# Patient Record
Sex: FEMALE | Race: WHITE | NOT HISPANIC OR LATINO | Employment: STUDENT | URBAN - METROPOLITAN AREA
[De-identification: names, ages, dates, MRNs, and addresses within clinical notes are randomized per-mention and may not be internally consistent; named-entity substitution may affect disease eponyms.]

---

## 2017-04-04 ENCOUNTER — TRANSFERRED RECORDS (OUTPATIENT)
Dept: HEALTH INFORMATION MANAGEMENT | Facility: CLINIC | Age: 27
End: 2017-04-04

## 2017-07-15 ENCOUNTER — HEALTH MAINTENANCE LETTER (OUTPATIENT)
Age: 27
End: 2017-07-15

## 2017-09-07 ENCOUNTER — TELEPHONE (OUTPATIENT)
Dept: FAMILY MEDICINE | Facility: CLINIC | Age: 27
End: 2017-09-07

## 2017-09-07 NOTE — TELEPHONE ENCOUNTER
9/7/2017    Call Regarding Preventive Health Screening Cervical/PAP    Attempt 1    Message BUSY TONE    Comments:       Outreach   KV

## 2017-09-12 NOTE — TELEPHONE ENCOUNTER
9/12/2017    Call Regarding Preventive Health Screening Cervical/PAP    Attempt 2    Message     Comments: BUSY TONE      Outreach   ALEJANDRINA

## 2017-09-18 NOTE — TELEPHONE ENCOUNTER
9/18/2017    Call Regarding Preventive Health Screening Cervical/PAP    Attempt 3    Message Busy    Comments:       Outreach   CC

## 2017-09-21 ENCOUNTER — MYC MEDICAL ADVICE (OUTPATIENT)
Dept: FAMILY MEDICINE | Facility: CLINIC | Age: 27
End: 2017-09-21

## 2017-09-28 ENCOUNTER — OFFICE VISIT (OUTPATIENT)
Dept: FAMILY MEDICINE | Facility: CLINIC | Age: 27
End: 2017-09-28
Payer: COMMERCIAL

## 2017-09-28 VITALS
HEART RATE: 64 BPM | SYSTOLIC BLOOD PRESSURE: 108 MMHG | BODY MASS INDEX: 20.12 KG/M2 | DIASTOLIC BLOOD PRESSURE: 66 MMHG | WEIGHT: 130.4 LBS | TEMPERATURE: 98.3 F

## 2017-09-28 DIAGNOSIS — J01.01 ACUTE RECURRENT MAXILLARY SINUSITIS: Primary | ICD-10-CM

## 2017-09-28 PROCEDURE — 99213 OFFICE O/P EST LOW 20 MIN: CPT | Performed by: NURSE PRACTITIONER

## 2017-09-28 RX ORDER — CEFPROZIL 500 MG/1
500 TABLET, FILM COATED ORAL 2 TIMES DAILY
Qty: 20 TABLET | Refills: 0 | Status: SHIPPED | OUTPATIENT
Start: 2017-09-28 | End: 2023-04-19

## 2017-09-28 NOTE — LETTER
9/28/2017     Rose Ronquillo  [address] [facility] [address]      To Whom It May Concern,    Please excuse Rose from work tonight. She does have a sinus infection and throat irritation she was started on medication today.      If her throat is still sore tomorrow please excuse her from work tomorrow as well.       Sincerely,        Anat Vargas APRN-[facility]  [address] [place] [address]  Phone: [phone]

## 2017-09-28 NOTE — NURSING NOTE
"Chief Complaint   Patient presents with     URI       Initial /66 (BP Location: Right arm, Patient Position: Chair, Cuff Size: Adult Regular)  Pulse 64  Temp 98.3  F (36.8  C) (Oral)  Wt 130 lb 6.4 oz (59.1 kg)  BMI 20.12 kg/m2 Estimated body mass index is 20.12 kg/(m^2) as calculated from the following:    Height as of 6/6/16: 5' 7.5\" (1.715 m).    Weight as of this encounter: 130 lb 6.4 oz (59.1 kg).  Medication Reconciliation: complete     Sandra Sanchez CMA (AAMA)      "

## 2017-09-28 NOTE — MR AVS SNAPSHOT
After Visit Summary   9/28/2017    Rose Ronquillo    MRN: [id number]           Patient Information     Date Of Birth          1990        Visit Information        Provider Department      9/28/2017 10:40 AM Anat Vargas APRN [facility]        Today's Diagnoses     Acute recurrent maxillary sinusitis    -  1      Care Instructions    Gargle with warm salt water.  For the sore throat use warm tea this will feel better,     Rest the throat    Start the Cefzil - antibiotic - today                   Follow-ups after your visit        Who to contact     Normal or non-critical lab and imaging results will be communicated to you by [employer], letter or phone within 4 business days after the clinic has received the results. If you do not hear from us within 7 days, please contact the clinic through [employer] or phone. If you have a critical or abnormal lab result, we will notify you by phone as soon as possible.  Submit refill requests through [employer] or call your pharmacy and they will forward the refill request to us. Please allow 3 business days for your refill to be completed.          If you need to speak with a  for additional information , please call: [phone]           Additional Information About Your Visit        [employer] Information     [employer] gives you secure access to your electronic health record. If you see a primary care provider, you can also send messages to your care team and make appointments. If you have questions, please call your primary care clinic.  If you do not have a primary care provider, please call [phone] and they will assist you.        Care EveryWhere ID     This is your Care EveryWhere ID. This could be used by other organizations to access your [place] medical records  [id number]        Your Vitals Were     Pulse Temperature BMI (Body Mass Index)             64 98.3  F (36.8  C) (Oral) 20.12 kg/m2          Blood  Pressure from Last 3 Encounters:   09/28/17 108/66   06/06/16 101/70   03/15/16 91/58    Weight from Last 3 Encounters:   09/28/17 130 lb 6.4 oz (59.1 kg)   06/06/16 135 lb (61.2 kg)   03/15/16 142 lb 3.2 oz (64.5 kg)              Today, you had the following     No orders found for display         Today's Medication Changes          These changes are accurate as of: 9/28/17 11:08 AM.  If you have any questions, ask your nurse or doctor.               Start taking these medicines.        Dose/Directions    cefPROZIL 500 MG tablet   Commonly known as:  CEFZIL   Used for:  Acute recurrent maxillary sinusitis   Started by:  Anat Vargas APRN CNP        Dose:  500 mg   Take 1 tablet (500 mg) by mouth 2 times daily   Quantity:  20 tablet   Refills:  0         These medicines have changed or have updated prescriptions.        Dose/Directions    ZOLOFT 50 MG tablet   This may have changed:  See the new instructions.   Generic drug:  sertraline        I tablet daily   Quantity:  30   Refills:  0            Where to get your medicines      These medications were sent to [facility] [address] [place] [address] AT [facility] [address]  [address] [facility] [address]     Phone:  [phone]     cefPROZIL 500 MG tablet                Primary Care Provider Office Phone # Fax #    Stephanie Scott DO [place] [phone] [address] [place] [facility] [address]        Equal Access to Services     LAMAR LEE AH: Hadii aad ku hadasho Soomaali, waaxda luqadaha, qaybta kaalmada adeegyada, waxay mendozain haybenton hanna. So [facility] [phone].    ATENCIÓN: Si habla español, tiene a dudley disposición servicios gratuitos de asistencia lingüística. Llame al [phone].    We comply with applicable federal civil rights laws and [place] laws. We do not discriminate on the basis of race, color, national origin, age, disability sex, sexual orientation or  gender identity.            Thank you!     Thank you for choosing [facility]  for your care. Our goal is always to provide you with excellent care. Hearing back from our patients is one way we can continue to improve our services. Please take a few minutes to complete the written survey that you may receive in the mail after your visit with us. Thank you!             Your Updated Medication List - Protect others around you: Learn how to safely use, store and throw away your medicines at www.disposemymeds.org.          This list is accurate as of: 9/28/17 11:08 AM.  Always use your most recent med list.                   Brand Name Dispense Instructions for use Diagnosis    BENEFIBER PO           cefPROZIL 500 MG tablet    CEFZIL    20 tablet    Take 1 tablet (500 mg) by mouth 2 times daily    Acute recurrent maxillary sinusitis       gabapentin 100 MG capsule    NEURONTIN     Take 100 mg by mouth Takes 400mg in the morning        ibuprofen 200 MG tablet    ADVIL/MOTRIN     Take 200 mg by mouth every 4 hours as needed        Ipratropium-Albuterol  MCG/ACT inhaler    COMBIVENT RESPIMAT    1 Inhaler    Inhale 1 puff into the lungs 4 times daily Not to exceed 6 doses per day.    Exercise induced bronchospasm       MULTI-VITAMIN PO           order for DME     1 Device    Equipment being ordered: ankle brace    Left ankle injury, initial encounter       TUMS PO      Take  by mouth.        VITAMIN D PO           VYVANSE 50 MG capsule   Generic drug:  lisdexamfetamine      1 CAPSULE DAILY        ZOLOFT 50 MG tablet   Generic drug:  sertraline     30    I tablet daily

## 2017-09-28 NOTE — PROGRESS NOTES
SUBJECTIVE:   Rose Ronquillo is a 27 year old female who presents to clinic today for the following health issues:      ENT Symptoms             Symptoms: cc Present Absent Comment   Fever/Chills  x  Sweats/chills, no fever   Fatigue   x    Muscle Aches   x    Eye Irritation   x    Sneezing   x    Nasal Manas/Drg  x  Runny nose - clear mucous, does have hx of nose  Bleeds due to deviated septum    Sinus Pressure/Pain  x  Pressure that moves around in the face   Loss of smell   x    Dental pain   x    Sore Throat  x  Uncomfortable, lots of PND - does not believe that she has strep   Swollen Glands  x     Ear Pain/Fullness   x    Cough   x    Wheeze   x    Chest Pain   x    Shortness of breath   x    Rash   x    Other x x  Hoarse voice, throat is painful ,   Works as  at restaurant ,      Symptom duration:  2 weeks   Symptom severity:  Not getting better, moderate   Treatments tried:  Supplement from chiropractor, fluids   Contacts:  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  Additional history: as documented    Patient Active Problem List   Diagnosis     Anxiety state     Head injury     Exercise-induced asthma     Abdominal pain     24 hour contact handout given     CARDIOVASCULAR SCREENING; LDL GOAL LESS THAN 160     Seasonal allergic rhinitis     Patellofemoral syndrome     Scoliosis concern     Migraine with aura and with status migrainosus, not intractable     Past Surgical History:   Procedure Laterality Date     HC CREATE EARDRUM OPENING,GEN ANESTH      P.E. Tubes x2     SURGICAL HISTORY OF -       Removal of PE Tubes       Social History   Substance Use Topics     Smoking status: Never Smoker     Smokeless tobacco: Never Used     Alcohol use Yes      Comment: occasional     Family History   Problem Relation Age of Onset     Hypertension Mother      blood pressure issues-not sure if its high or low     Neurologic Disorder Father       Myothesnia Gravis     Asthma Paternal Aunt          Current Outpatient Prescriptions   Medication Sig Dispense Refill     Wheat Dextrin (BENEFIBER PO)        gabapentin (NEURONTIN) 100 MG capsule Take 100 mg by mouth Takes 400mg in the morning       ZOLOFT 50 MG OR TABS I tablet daily (Patient taking differently: 1.5 tablets daily) 30 0     order for DME Equipment being ordered: ankle brace (Patient not taking: Reported on 9/28/2017) 1 Device 0     Ipratropium-Albuterol (COMBIVENT RESPIMAT)  MCG/ACT inhaler Inhale 1 puff into the lungs 4 times daily Not to exceed 6 doses per day. (Patient not taking: Reported on 9/28/2017) 1 Inhaler 0     Cholecalciferol (VITAMIN D PO)        Multiple Vitamin (MULTI-VITAMIN PO)        ibuprofen (ADVIL,MOTRIN) 200 MG tablet Take 200 mg by mouth every 4 hours as needed       Calcium Carbonate Antacid (TUMS PO) Take  by mouth.       VYVANSE 50 MG OR CAPS 1 CAPSULE DAILY       Allergies   Allergen Reactions     Erythromycin      EES-INTOL     Penicillins      PCN Stomach intol     BP Readings from Last 3 Encounters:   09/28/17 108/66   06/06/16 101/70   03/15/16 91/58    Wt Readings from Last 3 Encounters:   09/28/17 130 lb 6.4 oz (59.1 kg)   06/06/16 135 lb (61.2 kg)   03/15/16 142 lb 3.2 oz (64.5 kg)                        Reviewed and updated as needed this visit by clinical staff     Reviewed and updated as needed this visit by Provider         ROS:  See note above.     OBJECTIVE:     /66 (BP Location: Right arm, Patient Position: Chair, Cuff Size: Adult Regular)  Pulse 64  Temp 98.3  F (36.8  C) (Oral)  Wt 130 lb 6.4 oz (59.1 kg)  BMI 20.12 kg/m2  Body mass index is 20.12 kg/(m^2).   GENERAL: healthy, alert and no distress  EYES: Eyes grossly normal to inspection, PERRL and conjunctivae and sclerae normal  HENT: ear canals and TM's normal, POSITIVE for nasal mucosa edematous , rhinorrhea purulent, oropharynx clear, oral mucous membranes moist and POSITIVE for  sinuses: maxillary, frontal, ethmoid tenderness bilaterally  NECK: POSITIVE for bilateral anterior and posterior cervical adenopathy and no asymmetry, masses, or scars  RESP: lungs clear to auscultation - no rales, rhonchi or wheezes  CV: regular rate and rhythm, normal S1 S2, no S3 or S4, no murmur, click or rub, no peripheral edema and peripheral pulses strong      Diagnostic Test Results:  none     ASSESSMENT/PLAN:     ASSESSMENT/PLAN:      ICD-10-CM    1. Acute recurrent maxillary sinusitis J01.01 cefPROZIL (CEFZIL) 500 MG tablet       Patient Instructions   Gargle with warm salt water.  For the sore throat use warm tea this will feel better,     Rest the throat      Start the Cefzil - antibiotic - today       MEDICATIONS:        - Start taking Cefzil        - Continue other medications without change  See Patient Instructions    VICKY MARISCAL NP, APRN [facility]

## 2017-09-28 NOTE — PATIENT INSTRUCTIONS
Gargle with warm salt water.  For the sore throat use warm tea this will feel better,     Rest the throat    Start the Cefzil - antibiotic - today

## 2017-12-09 ENCOUNTER — MYC MEDICAL ADVICE (OUTPATIENT)
Dept: FAMILY MEDICINE | Facility: CLINIC | Age: 27
End: 2017-12-09

## 2017-12-23 ENCOUNTER — NURSE TRIAGE (OUTPATIENT)
Dept: NURSING | Facility: CLINIC | Age: 27
End: 2017-12-23

## 2017-12-24 ENCOUNTER — TRANSFERRED RECORDS (OUTPATIENT)
Dept: HEALTH INFORMATION MANAGEMENT | Facility: CLINIC | Age: 27
End: 2017-12-24

## 2017-12-24 NOTE — TELEPHONE ENCOUNTER
Caller just finished antibiotic prescribed by on [facility]; symptoms are returning; inquiring if she can have new RX called in; does not know what antibiotic she was on.  Triage protocol reviewed; no fever or chills; voice is hoarse and feels sinus congestion persisting. Advised in home care  Advised reevaluation.   Will return to previous facility tomorrow.  Reason for Disposition    [1] Taking antibiotic > 72 hours (3 days) AND [2] sinus pain not improved    Protocols used: SINUS INFECTION ON ANTIBIOTIC FOLLOW-UP CALL-ADULT-

## 2018-01-05 ENCOUNTER — MYC MEDICAL ADVICE (OUTPATIENT)
Dept: FAMILY MEDICINE | Facility: CLINIC | Age: 28
End: 2018-01-05

## 2018-01-05 DIAGNOSIS — J32.9 CHRONIC SINUSITIS, UNSPECIFIED LOCATION: Primary | ICD-10-CM

## 2018-04-17 ENCOUNTER — TELEPHONE (OUTPATIENT)
Dept: FAMILY MEDICINE | Facility: CLINIC | Age: 28
End: 2018-04-17

## 2018-04-17 NOTE — TELEPHONE ENCOUNTER
Panel Management Review      Patient has the following on her problem list:     Asthma review     ACT Total Scores 6/6/2016   ACT TOTAL SCORE -   ASTHMA ER VISITS -   ASTHMA HOSPITALIZATIONS -   ACT TOTAL SCORE (Goal Greater than or Equal to 20) 24   In the past 12 months, how many times did you visit the emergency room for your asthma without being admitted to the hospital? 0   In the past 12 months, how many times were you hospitalized overnight because of your asthma? 0      1. Is Asthma diagnosis on the Problem List? Yes    2. Is Asthma listed on Health Maintenance? Yes    3. Patient is due for:  ACT and AAP      Composite cancer screening  Chart review shows that this patient is due/due soon for the following Pap Smear  Summary:    Patient is due/failing the following:   PAP, ACT/AAP    Action needed:   Patient needs office visit for physical with PAP and Patient needs to do ACT, update AAP.    Type of outreach:    Sent [employer] message.    Questions for provider review:    None                                                                                                                                    Sandra Brito CMA (AAMA)       Chart routed to None .

## 2018-08-06 ENCOUNTER — TELEPHONE (OUTPATIENT)
Dept: FAMILY MEDICINE | Facility: CLINIC | Age: 28
End: 2018-08-06

## 2018-08-06 NOTE — LETTER
August 6, 2018      Rose Ronquillo  [address] [facility] [address]        Dear Rose,     As part of [facility]'s commitment to health and wellness we have reviewed your chart and it indicates that you are due for one or more of the following:    -- Pap smear. The last pap that we have on file for you was from 12/23/2013. These are recommended every 3 years. Please call our clinic to schedule your pap smear / physical appointment.     -- An Asthma Control Test. To ensure you are receiving the best care, it is important to stay current with healthcare visits. We recommend that you have an office visit every 6 months for your asthma and have an  Updated Asthma Control Test and Asthma Action Plan each year to help you stay in good control with your asthma.      We have enclosed the Asthma Control Test for you to fill out and mail back to us (no matter what your score is).  If your end score is 20 or less, please schedule an appointment to discuss your asthma score.    Please try to schedule and/or complete the tests above within the next 2-4 weeks.   The number to call to schedule an appointment at [facility] is [phone].    While we work hard to maintain accurate records, it is always possible that this notice does not accurately reflect tests that you may have had. To ensure that we do not send you unnecessary notices please verify that we have accurate dates of your tests, even if these were done many years ago.     Thank you for choosing [facility] for your healthcare needs.      Sincerely,     ERYN Albrecht/ ant

## 2018-08-06 NOTE — LETTER
My Asthma Action Plan  Name: Rose Ronquillo   YOB: 1990  Date: 8/6/2018   My doctor: VICKY MARISCAL NP, APRN CNP   My clinic: [facility]         My Asthma Severity: Exercise-induced  Avoid your asthma triggers: exercise or sports and see attached list of possible triggers           GREEN ZONE   Good Control    I feel good    No cough or wheeze    Can work, sleep and play without asthma symptoms       Take your asthma control medicine every day.     1. If exercise triggers your asthma, take your rescue medication    15 minutes before exercise or sports, and    During exercise if you have asthma symptoms  2. Spacer to use with inhaler: If you have a spacer, make sure to use it with your inhaler             YELLOW ZONE Getting Worse  I have ANY of these:    I do not feel good    Cough or wheeze    Chest feels tight    Wake up at night   1. Keep taking your Green Zone medications  2. Start taking your rescue medicine:    every 20 minutes for up to 1 hour. Then every 4 hours for 24-48 hours.  3. If you stay in the Yellow Zone for more than 12-24 hours, contact your doctor.  4. If you do not return to the Green Zone in 12-24 hours or you get worse, start taking your oral steroid medicine if prescribed by your provider.           RED ZONE Medical Alert - Get Help  I have ANY of these:    I feel awful    Medicine is not helping    Breathing getting harder    Trouble walking or talking    Nose opens wide to breathe       1. Take your rescue medicine NOW  2. If your provider has prescribed an oral steroid medicine, start taking it NOW  3. Call your doctor NOW  4. If you are still in the Red Zone after 20 minutes and you have not reached your doctor:    Take your rescue medicine again and    Call 911 or go to the emergency room right away    See your regular doctor within 2 weeks of an Emergency Room or Urgent Care visit for follow-up treatment.          Annual Reminders:  Meet with Asthma  Educator,  Flu Shot in the Fall, consider Pneumonia Vaccination for patients with asthma (aged 19 and older).    Pharmacy:    [facility] - [facility] [place] - [address] [id number] - [place], [place] - [address] [place] [address] DRUG STORE [id number] - [facility] [address] AT [facility] [address]                      Asthma Triggers  How To Control Things That Make Your Asthma Worse    Triggers are things that make your asthma worse.  Look at the list below to help you find your triggers and what you can do about them.  You can help prevent asthma flare-ups by staying away from your triggers.      Trigger                                                          What you can do   Cigarette Smoke  Tobacco smoke can make asthma worse. Do not allow smoking in your home, car or around you.  Be sure no one smokes at a child s day care or school.  If you smoke, ask your health care provider for ways to help you quit.  Ask family members to quit too.  Ask your health care provider for a referral to Quit Plan to help you quit smoking, or call 6-820-921-PLAN.     Colds, Flu, Bronchitis  These are common triggers of asthma. Wash your hands often.  Don t touch your eyes, nose or mouth.  Get a flu shot every year.     Dust Mites  These are tiny bugs that live in cloth or carpet. They are too small to see. Wash sheets and blankets in hot water every week.   Encase pillows and mattress in dust mite proof covers.  Avoid having carpet if you can. If you have carpet, vacuum weekly.   Use a dust mask and HEPA vacuum.   Pollen and Outdoor Mold  Some people are allergic to trees, grass, or weed pollen, or molds. Try to keep your windows closed.  Limit time out doors when pollen count is high.   Ask you health care provider about taking medicine during allergy season.     Animal Dander  Some people are allergic to skin flakes, urine or saliva from pets with fur or feathers. Keep pets with  fur or feathers out of your home.    If you can t keep the pet outdoors, then keep the pet out of your bedroom.  Keep the bedroom door closed.  Keep pets off cloth furniture and away from stuffed toys.     Mice, Rats, and Cockroaches  Some people are allergic to the waste from these pests.   Cover food and garbage.  Clean up spills and food crumbs.  Store grease in the refrigerator.   Keep food out of the bedroom.   Indoor Mold  This can be a trigger if your home has high moisture. Fix leaking faucets, pipes, or other sources of water.   Clean moldy surfaces.  Dehumidify basement if it is damp and smelly.   Smoke, Strong Odors, and Sprays  These can reduce air quality. Stay away from strong odors and sprays, such as perfume, powder, hair spray, paints, smoke incense, paint, cleaning products, candles and new carpet.   Exercise or Sports  Some people with asthma have this trigger. Be active!  Ask your doctor about taking medicine before sports or exercise to prevent symptoms.    Warm up for 5-10 minutes before and after sports or exercise.     Other Triggers of Asthma  Cold air:  Cover your nose and mouth with a scarf.  Sometimes laughing or crying can be a trigger.  Some medicines and food can trigger asthma.

## 2018-08-06 NOTE — TELEPHONE ENCOUNTER
Panel Management Review      Patient has the following on her problem list:     Asthma review     ACT Total Scores 6/6/2016   ACT TOTAL SCORE -   ASTHMA ER VISITS -   ASTHMA HOSPITALIZATIONS -   ACT TOTAL SCORE (Goal Greater than or Equal to 20) 24   In the past 12 months, how many times did you visit the emergency room for your asthma without being admitted to the hospital? 0   In the past 12 months, how many times were you hospitalized overnight because of your asthma? 0      1. Is Asthma diagnosis on the Problem List? Yes    2. Is Asthma listed on Health Maintenance? Yes    3. Patient is due for:  ACT and AAP      Composite cancer screening  Chart review shows that this patient is due/due soon for the following Pap Smear  Summary:    Patient is due/failing the following:   PAP; ACT/AAP    Action needed:   Patient needs office visit for physical with PAP and Patient needs to do ACT, update AAP.    Type of outreach:    Sent letter.    Questions for provider review:    None                                                                                                                                    Sandra Brito CMA (AAMA)       Chart routed to None .

## 2019-11-03 ENCOUNTER — HEALTH MAINTENANCE LETTER (OUTPATIENT)
Age: 29
End: 2019-11-03

## 2020-11-16 ENCOUNTER — HEALTH MAINTENANCE LETTER (OUTPATIENT)
Age: 30
End: 2020-11-16

## 2021-04-13 ENCOUNTER — IMMUNIZATION (OUTPATIENT)
Dept: NURSING | Facility: CLINIC | Age: 31
End: 2021-04-13
Payer: COMMERCIAL

## 2021-04-13 PROCEDURE — 91300 PR COVID VAC PFIZER DIL RECON 30 MCG/0.3 ML IM: CPT

## 2021-04-13 PROCEDURE — 0001A PR COVID VAC PFIZER DIL RECON 30 MCG/0.3 ML IM: CPT

## 2021-05-04 ENCOUNTER — IMMUNIZATION (OUTPATIENT)
Dept: NURSING | Facility: CLINIC | Age: 31
End: 2021-05-04
Attending: INTERNAL MEDICINE
Payer: COMMERCIAL

## 2021-05-04 PROCEDURE — 91300 PR COVID VAC PFIZER DIL RECON 30 MCG/0.3 ML IM: CPT

## 2021-05-04 PROCEDURE — 0002A PR COVID VAC PFIZER DIL RECON 30 MCG/0.3 ML IM: CPT

## 2021-09-18 ENCOUNTER — HEALTH MAINTENANCE LETTER (OUTPATIENT)
Age: 31
End: 2021-09-18

## 2022-01-08 ENCOUNTER — HEALTH MAINTENANCE LETTER (OUTPATIENT)
Age: 32
End: 2022-01-08

## 2022-09-19 ENCOUNTER — TRANSFERRED RECORDS (OUTPATIENT)
Dept: HEALTH INFORMATION MANAGEMENT | Facility: CLINIC | Age: 32
End: 2022-09-19

## 2022-10-12 NOTE — TELEPHONE ENCOUNTER
DIAGNOSIS: childhood scoliosis,increase pain in spine and neck with worsening curve, self referred by mother Lexy Ronquillo   APPOINTMENT DATE: 10.27.22   NOTES STATUS DETAILS   OFFICE NOTE from other specialist Internal 5.6.10 Dr Stephanie Colunga, [facility]   MEDICATION LIST Internal    LABS     CBC/DIFF Internal    XRAYS (IMAGES & REPORTS) Internal 5.6.10 lumbar spine, thoracic spine  3.31.08 lumbar spine, [facility] - n/a     Action 10.12.22 9:33 AM TAMIKA   Action Taken Faxed request to [facility] for images [phone]     Action 10.13.22 8:34 AM TAMIKA   Action Taken Sent pt an email asking about records     Action 10.20.22 8:14 AM TAMIKA   Action Taken Sent [employer] message to pt     Action 10.25.22 11:04 AM TAMIKA   Action Taken Faxed request to [facility] [phone] and [facility] [phone] for images      Action 10.28.22 4:17 PM    Action Taken Received disc from [facility]. Put disc in [employer]'s drop basket for processing.       Action November 4, 2022 4:07 PM [place]    Action Taken Received imaging disc for 9/8/22 spinal views and sent it to scan to be process

## 2022-10-25 DIAGNOSIS — M41.9 SCOLIOSIS: Primary | ICD-10-CM

## 2022-10-26 ASSESSMENT — ENCOUNTER SYMPTOMS
POLYDIPSIA: 0
HOARSE VOICE: 1
POLYPHAGIA: 0
DIARRHEA: 1
BACK PAIN: 1
ARTHRALGIAS: 0
RECTAL PAIN: 0
SORE THROAT: 1
SPEECH CHANGE: 0
DIZZINESS: 1
NIGHT SWEATS: 1
ABDOMINAL PAIN: 1
TREMORS: 0
TROUBLE SWALLOWING: 0
BOWEL INCONTINENCE: 0
BLOOD IN STOOL: 0
MYALGIAS: 1
WEAKNESS: 1
LOSS OF CONSCIOUSNESS: 0
HEARTBURN: 0
NECK MASS: 0
NECK PAIN: 1
MUSCLE CRAMPS: 1
VOMITING: 0
FEVER: 1
PARALYSIS: 0
BLOATING: 1
DECREASED APPETITE: 1
WEIGHT GAIN: 0
SINUS CONGESTION: 1
TASTE DISTURBANCE: 0
SMELL DISTURBANCE: 0
HALLUCINATIONS: 0
TINGLING: 1
MUSCLE WEAKNESS: 1
FATIGUE: 1
ALTERED TEMPERATURE REGULATION: 1
MEMORY LOSS: 0
WEIGHT LOSS: 0
JOINT SWELLING: 0
SEIZURES: 0
HEADACHES: 1
SINUS PAIN: 1
DISTURBANCES IN COORDINATION: 0
NAUSEA: 0
CHILLS: 1
INCREASED ENERGY: 0
STIFFNESS: 1
NUMBNESS: 1
CONSTIPATION: 0
JAUNDICE: 0

## 2022-10-27 ENCOUNTER — OFFICE VISIT (OUTPATIENT)
Dept: ORTHOPEDICS | Facility: CLINIC | Age: 32
End: 2022-10-27
Payer: COMMERCIAL

## 2022-10-27 ENCOUNTER — PRE VISIT (OUTPATIENT)
Dept: ORTHOPEDICS | Facility: CLINIC | Age: 32
End: 2022-10-27

## 2022-10-27 ENCOUNTER — ANCILLARY PROCEDURE (OUTPATIENT)
Dept: GENERAL RADIOLOGY | Facility: CLINIC | Age: 32
End: 2022-10-27
Attending: ORTHOPAEDIC SURGERY
Payer: COMMERCIAL

## 2022-10-27 VITALS — BODY MASS INDEX: 20.16 KG/M2 | HEIGHT: 68 IN | WEIGHT: 133 LBS

## 2022-10-27 DIAGNOSIS — M54.6 CHRONIC MIDLINE THORACIC BACK PAIN: Primary | ICD-10-CM

## 2022-10-27 DIAGNOSIS — M42.00 SCHEUERMANN'S KYPHOSIS: ICD-10-CM

## 2022-10-27 DIAGNOSIS — G89.29 CHRONIC MIDLINE THORACIC BACK PAIN: Primary | ICD-10-CM

## 2022-10-27 DIAGNOSIS — M41.9 SCOLIOSIS: ICD-10-CM

## 2022-10-27 PROCEDURE — 72082 X-RAY EXAM ENTIRE SPI 2/3 VW: CPT | Performed by: STUDENT IN AN ORGANIZED HEALTH CARE EDUCATION/TRAINING PROGRAM

## 2022-10-27 PROCEDURE — 72100 X-RAY EXAM L-S SPINE 2/3 VWS: CPT | Performed by: STUDENT IN AN ORGANIZED HEALTH CARE EDUCATION/TRAINING PROGRAM

## 2022-10-27 PROCEDURE — 99024 POSTOP FOLLOW-UP VISIT: CPT | Mod: GC | Performed by: ORTHOPAEDIC SURGERY

## 2022-10-27 PROCEDURE — 77073 BONE LENGTH STUDIES: CPT | Performed by: STUDENT IN AN ORGANIZED HEALTH CARE EDUCATION/TRAINING PROGRAM

## 2022-10-27 NOTE — LETTER
10/27/2022         RE: Rose Ronquillo  [address] [id number]        Dear Colleague,    Thank you for referring your patient, Rose Ronquillo, to the [facility] [place]. Please see a copy of my visit note below.    Rose Ronquillo complains of    Chief Complaint   Patient presents with     Consult     childhood scoliosis,increase pain in spine and neck with worsening curve, self referred by mother Lexy Ronquillo        I have reviewed and updated the patient's Past Medical History, Social History, Family History and Medication List.    ALLERGIES  Erythromycin and Penicillins    Spine Surgery Consultation    REFERRING PHYSICIAN: Referred Self   PRIMARY CARE PHYSICIAN: Stephanie Colunga           Chief Complaint:   Consult (childhood scoliosis,increase pain in spine and neck with worsening curve, self referred by mother Lexy Ronquillo )      History of Present Illness:  Symptom Profile Including: location of symptoms, onset, severity, exacerbating/alleviating factors, previous treatments:        Rose Ronquillo is a 32 year old female with past medical history significant for spina bifida occulta who presents today for evaluation of worsening thoracolumbar pain with bilateral upper extremity (right greater than left) radicular symptoms.  Patient denies acute traumatic inciting event but indolent pain about her back for as long as she can remember.  She has had an increase in pain over the past 1 to 2 years that is affecting her day-to-day life.  Rates her pain as a 3/10 today, 8-9/10 pain at its worst.  Localizes pain to the midline thoracolumbar spine in addition to cervical paraspinal musculature.  Occasional radiation of pain/paresthesias in bilateral C7 nerve distribution with a positive Spurling sign if she has maximal lateral rotation to the right over the past year.  Pain aggravated by excessive workouts, terminal right lateral neck rotation, attempted  back extension,; alleviated by chiropractic manipulations/adjustments, massage, CBD cream.  Takes ibuprofen as needed for pain.    Denies physical therapy, injections.  No history of injury or previous surgery to the spine.    PMH:  Past Medical History:   Diagnosis Date     Unspecified otitis media    Spina bifida occulta    PSH:  Past Surgical History:   Procedure Laterality Date     HC CREATE EARDRUM OPENING,GEN ANESTH      P.E. 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  Tobacco: Denies  Alcohol: Socially, glass of wine every other night  Illicits: Denies    FH: No known personal or family history of blood clots, bleeding disorders, postanesthesia    HILL Scores:  Oswestry (HILL) Questionnaire    OSWESTRY DISABILITY INDEX 10/26/2022   Count 10   Sum 10   Oswestry Score (%) 20   Some recent data might be hidden      SRS: 56%    PROMIS-10 Scores:  Global Mental Health Score: (P) 14  Global Physical Health Score: (P) 13  PROMIS TOTAL - SUBSCORES: (P) 27         Physical Exam:   PHYSICAL EXAM:   Constitutional - Patient is healthy, well-nourished and appears stated age   Respiratory - Patient is breathing normally and in no respiratory distress.   Skin - No suspicious rashes or lesions.   Psychiatric - Normal mood and affect.   Cardiovascular - Extremities warm and well perfused.   Eyes - Visual acuity is normal to the written word.   ENT - Hearing intact to the spoken word.   Musculoskeletal - Non-antalgic gait without use of assistive devices.        Cervical spine:    Appearance - Normal     Palpation -tender to palpation over bilateral paraspinal musculature    ROM - Full, pain with terminal right lateral rotation    Motor -     UPPER EXTREMITY Left Right   Shoulder abduction (C5) 5/5 5/5   Elbow flexion (C6) 5/5 5/5   Wrist extension (C6)  5/5 5/5   Wrist flexion (C7) 5/5 5/5   Elbow extension (C7) 5/5 5/5     strength (C8) 5/5 5/5   Finger ab/adduction (T1) 5/5 5/5         Special Tests -      Lhermitte's Test -negative     Spurling's Test -positive on the right and C7 distribution       Positive Tinel's over bilateral cubital tunnel and bilateral carpal tunnel (right greater than left)     Subluxating right ulnar nerve at the cubital tunnel with elbow flexion/extension     Positive Michele for carpal tunnel symptoms     Positive scratch collapse over the right cubital tunnel          Thoracic Spine:    Appearance - Normal     Palpation - Nontender to palpation    Strength/ROM - deferred            Lumbar Spine:    Appearance - Normal     Palpation -tender to palpation over the midline thoracolumbar junction    ROM - Full, pain with back extension    Motor -        LOWER EXTREMITY Left Right   Hip flexion 5/5 5/5   Knee flexion 5/5 5/5   Knee extension 5/5 5/5   Ankle dorsiflexion 5/5 5/5   Ankle plantarflexion 5/5 5/5   Great toe extension 5/5 5/5        Special tests -     Pain with hip ROM -negative     Facet loading -markedly positive bilaterally     Neurologic - Sensation intact to light touch bilaterally over C5-T1 nerve distribution with numbness sensation over C7 nerve distributions bilaterally; intact to light touch over L2-S1 nerve distributions.             Imaging:     Independently reviewed & provided my own interpretation of new radiographs and/ or advanced imaging at this clinic visit. The results were discussed with the patient.  Findings include:    AP/lateral total spine EOS radiographs obtained on 10/27/2022 demonstrating relatively normal coronal balance.  Thoracolumbar curvature 8 degrees, lumbar curvature 4 degrees.  Gross only normal sagittal balance.  Thoracic kyphosis 3 7 degrees, thoracolumbar kyphosis 31 degrees.  Wedged vertebra from T9-12 contributing to thoracic kyphosis.  [facility]      Flexion extension radiographs from 10/27/thousand 22 again demonstrate Baastrup's phenomena seen about  lumbar spine with maximal compensatory lumbar lordosis and contacting spinous processes as there is minimal change with extension radiographs                 Assessment and Plan:   Assessment:  1.  Thoracolumbar Shermans kyphosis  2.  History of spina bifida occulta    32 year old female with with axial thoracolumbar back pain secondary to the above stated pathology.  Patient has no concerning radiographic features of the pain scoliosis.  However she does have imaging findings consistent with Wlaters's criteria for Shermans kyphosis with wedged vertebra T9-12 and thoracolumbar kyphosis greater than 30 degrees.  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    Recommend MRI of thoracic/lumbar spine for further evaluation.  Recommend physical therapy focused on strengthening of the mid back via spine extension program focused on thoracolumbar region.  Referral placed for physical therapy, recommend [place] evaluation with Prachi Arriaga.    Regarding the patient's upper extremity radicular symptoms and findings consistent with bilateral upper extremity compressive neuropathies, could represent sensitization of her nerve roots as patient has exam findings consistent with musculoskeletal pain reproducible via palpation of paraspinal musculature and very sporadic paresthesias.  If patient has more progressive and constant paresthesias in ulnar median nerve distributions, recommend reevaluation and consideration of night splinting as a first intervention.    Counseled the patient at length that nonoperative means will be undertaken presently with close follow-up in 6 weeks to 3 months for reevaluation.  Anticipate, however, that she will be looking at a surgical intervention aimed about the thoracal lumbar spine in the hopes of preserving or  lumbosacral spine is seeing a lot of compensatory load currently.       Plan:  1.  MRI thoracic/lumbar spine ordered  2.  Referral placed for physical therapy, recommend [place] evaluation with Prachi Arriaga - focused on strengthening of the mid back via spine extension program focused on thoracolumbar region.  3.  Follow-up with Dr. Culp in 6 weeks to 3 months after trial of physical therapy with repeat flexion/extension lumbar radiographs      Patient seen and discussed with Dr. Suni Brennan MD  Orthopaedic Surgery Resident Physician    Dictation Disclaimer: Some of this Note has been completed with voice-recognition dictation software. Although errors are generally corrected real-time, there is the potential for a rare error to be present in the completed chart.        Clearly she has thoracolumbar Scheuermann's kyphosis. Her standing lumbar lordosis is greater than her supine extension bending film indicating that she no longer has any spinal reserve to extend any further. This now results in extension overloading of the facets leading to early degeneration. She is already having pain. The appropriate consideration is to correct the thoracolumabr kyphosis in order to preserve the lumbar motion segments. We will get the MRI's of her thoracic and lumbar spine. We will have PT work with her on a focal TL spinal extension strengthening program. But I do believe that surgery is in her not too distant future. My expectation is that this will be a T9-L1 posterior spinal fusion with Smith-Paniagua osteotomies, possibly a TLIF at T12-L1. We discussed this in some detail.    Wong Culp MD

## 2022-10-27 NOTE — PROGRESS NOTES
Rose Ronquillo complains of    Chief Complaint   Patient presents with     Consult     childhood scoliosis,increase pain in spine and neck with worsening curve, self referred by mother Lexy Ronquillo        I have reviewed and updated the patient's Past Medical History, Social History, Family History and Medication List.    ALLERGIES  Erythromycin and Penicillins    Spine Surgery Consultation    REFERRING PHYSICIAN: Referred Self   PRIMARY CARE PHYSICIAN: Stephanie Colunga           Chief Complaint:   Consult (childhood scoliosis,increase pain in spine and neck with worsening curve, self referred by mother Lexy Ronquillo )      History of Present Illness:  Symptom Profile Including: location of symptoms, onset, severity, exacerbating/alleviating factors, previous treatments:        Rose Ronquillo is a 32 year old female with past medical history significant for spina bifida occulta who presents today for evaluation of worsening thoracolumbar pain with bilateral upper extremity (right greater than left) radicular symptoms.  Patient denies acute traumatic inciting event but indolent pain about her back for as long as she can remember.  She has had an increase in pain over the past 1 to 2 years that is affecting her day-to-day life.  Rates her pain as a 3/10 today, 8-9/10 pain at its worst.  Localizes pain to the midline thoracolumbar spine in addition to cervical paraspinal musculature.  Occasional radiation of pain/paresthesias in bilateral C7 nerve distribution with a positive Spurling sign if she has maximal lateral rotation to the right over the past year.  Pain aggravated by excessive workouts, terminal right lateral neck rotation, attempted back extension,; alleviated by chiropractic manipulations/adjustments, massage, CBD cream.  Takes ibuprofen as needed for pain.    Denies physical therapy, injections.  No history of injury or previous surgery to the spine.    PMH:  Past Medical History:   Diagnosis  Date     Unspecified otitis media    Spina bifida occulta    PSH:  Past Surgical History:   Procedure Laterality Date     HC CREATE EARDRUM OPENING,GEN ANESTH      P.E. 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  Tobacco: Denies  Alcohol: Socially, glass of wine every other night  Illicits: Denies    FH: No known personal or family history of blood clots, bleeding disorders, postanesthesia    HILL Scores:  Oswestry (HILL) Questionnaire    OSWESTRY DISABILITY INDEX 10/26/2022   Count 10   Sum 10   Oswestry Score (%) 20   Some recent data might be hidden      SRS: 56%    PROMIS-10 Scores:  Global Mental Health Score: (P) 14  Global Physical Health Score: (P) 13  PROMIS TOTAL - SUBSCORES: (P) 27         Physical Exam:   PHYSICAL EXAM:   Constitutional - Patient is healthy, well-nourished and appears stated age   Respiratory - Patient is breathing normally and in no respiratory distress.   Skin - No suspicious rashes or lesions.   Psychiatric - Normal mood and affect.   Cardiovascular - Extremities warm and well perfused.   Eyes - Visual acuity is normal to the written word.   ENT - Hearing intact to the spoken word.   Musculoskeletal - Non-antalgic gait without use of assistive devices.        Cervical spine:    Appearance - Normal     Palpation -tender to palpation over bilateral paraspinal musculature    ROM - Full, pain with terminal right lateral rotation    Motor -     UPPER EXTREMITY Left Right   Shoulder abduction (C5) 5/5 5/5   Elbow flexion (C6) 5/5 5/5   Wrist extension (C6)  5/5 5/5   Wrist flexion (C7) 5/5 5/5   Elbow extension (C7) 5/5 5/5    strength (C8) 5/5 5/5   Finger ab/adduction (T1) 5/5 5/5         Special Tests -      Lhermitte's Test -negative     Spurling's Test -positive on the right and C7 distribution       Positive Tinel's over bilateral cubital tunnel and bilateral carpal tunnel (right  greater than left)     Subluxating right ulnar nerve at the cubital tunnel with elbow flexion/extension     Positive Michele for carpal tunnel symptoms     Positive scratch collapse over the right cubital tunnel          Thoracic Spine:    Appearance - Normal     Palpation - Nontender to palpation    Strength/ROM - deferred            Lumbar Spine:    Appearance - Normal     Palpation -tender to palpation over the midline thoracolumbar junction    ROM - Full, pain with back extension    Motor -        LOWER EXTREMITY Left Right   Hip flexion 5/5 5/5   Knee flexion 5/5 5/5   Knee extension 5/5 5/5   Ankle dorsiflexion 5/5 5/5   Ankle plantarflexion 5/5 5/5   Great toe extension 5/5 5/5        Special tests -     Pain with hip ROM -negative     Facet loading -markedly positive bilaterally     Neurologic - Sensation intact to light touch bilaterally over C5-T1 nerve distribution with numbness sensation over C7 nerve distributions bilaterally; intact to light touch over L2-S1 nerve distributions.             Imaging:     Independently reviewed & provided my own interpretation of new radiographs and/ or advanced imaging at this clinic visit. The results were discussed with the patient.  Findings include:    AP/lateral total spine EOS radiographs obtained on 10/27/2022 demonstrating relatively normal coronal balance.  Thoracolumbar curvature 8 degrees, lumbar curvature 4 degrees.  Gross only normal sagittal balance.  Thoracic kyphosis 3 7 degrees, thoracolumbar kyphosis 31 degrees.  Wedged vertebra from T9-12 contributing to thoracic kyphosis.  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  Thoracolumbar Shermans kyphosis  2.  History of spina bifida  jacquelina    32 year old female with with axial thoracolumbar back pain secondary to the above stated pathology.  Patient has no concerning radiographic features of the pain scoliosis.  However she does have imaging findings consistent with Walters's criteria for Shermans kyphosis with wedged vertebra T9-12 and thoracolumbar kyphosis greater than 30 degrees.  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    Recommend MRI of thoracic/lumbar spine for further evaluation.  Recommend physical therapy focused on strengthening of the mid back via spine extension program focused on thoracolumbar region.  Referral placed for physical therapy, recommend [place] evaluation with Prachi Arriaga.    Regarding the patient's upper extremity radicular symptoms and findings consistent with bilateral upper extremity compressive neuropathies, could represent sensitization of her nerve roots as patient has exam findings consistent with musculoskeletal pain reproducible via palpation of paraspinal musculature and very sporadic paresthesias.  If patient has more progressive and constant paresthesias in ulnar median nerve distributions, recommend reevaluation and consideration of night splinting as a first intervention.    Counseled the patient at length that nonoperative means will be undertaken presently with close follow-up in 6 weeks to 3 months for reevaluation.  Anticipate, however, that she will be looking at a surgical intervention aimed about the thoracal lumbar spine in the hopes of preserving or lumbosacral spine is seeing a lot of compensatory load currently.       Plan:  1.  MRI thoracic/lumbar spine ordered  2.  Referral placed for physical therapy, recommend [place] evaluation with Prachi Arriaga - focused on strengthening of the mid back via spine  extension program focused on thoracolumbar region.  3.  Follow-up with Dr. Culp in 6 weeks to 3 months after trial of physical therapy with repeat flexion/extension lumbar radiographs      Patient seen and discussed with Dr. Suni Brennan MD  Orthopaedic Surgery Resident Physician    Dictation Disclaimer: Some of this Note has been completed with voice-recognition dictation software. Although errors are generally corrected real-time, there is the potential for a rare error to be present in the completed chart.        Clearly she has thoracolumbar Scheuermann's kyphosis. Her standing lumbar lordosis is greater than her supine extension bending film indicating that she no longer has any spinal reserve to extend any further. This now results in extension overloading of the facets leading to early degeneration. She is already having pain. The appropriate consideration is to correct the thoracolumabr kyphosis in order to preserve the lumbar motion segments. We will get the MRI's of her thoracic and lumbar spine. We will have PT work with her on a focal TL spinal extension strengthening program. But I do believe that surgery is in her not too distant future. My expectation is that this will be a T9-L1 posterior spinal fusion with Smith-Paniagua osteotomies, possibly a TLIF at T12-L1. We discussed this in some detail.    Wong Culp MD

## 2022-10-27 NOTE — NURSING NOTE
"Reason For Visit:   Chief Complaint   Patient presents with     Consult     childhood scoliosis,increase pain in spine and neck with worsening curve, self referred by mother Lexy Ronquillo        Primary MD: Stephanie Colunga  Ref. MD: Self referred    ?  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 Yes.  Work status?  Part-time.    Date of injury: No  Type of injury: childhood scoli.    Date of surgery: No  Type of surgery: No.    Smoker: No  Request smoking cessation information: No    Ht 1.723 m (5' 7.84\")   Wt 60.3 kg (133 lb)   BMI 20.32 kg/m      Pain Assessment  Patient Currently in Pain: Yes  0-10 Pain Scale: 7 (highly uncomfortable not pain)  Primary Pain Location: Back    SRS 56%    Oswestry (HILL) Questionnaire    OSWESTRY DISABILITY INDEX 10/26/2022   Count 10   Sum 10   Oswestry Score (%) 20   Some recent data might be hidden        Visual Analog Pain Scale  Back Pain Scale 0-10: 7  Right leg pain: 0  Left leg pain: 0  Neck Pain Scale 0-10: 0  Right arm pain: 0  Left arm pain: 0    Promis 10 Assessment    PROMIS 10 10/26/2022   In general, would you say your health is: Very good   In general, would you say your quality of life is: Very good   In general, how would you rate your physical health? Good   In general, how would you rate your mental health, including your mood and your ability to think? Very good   In general, how would you rate your satisfaction with your social activities and relationships? Good   In general, please rate how well you carry out your usual social activities and roles Good   To what extent are you able to carry out your everyday physical activities such as walking, climbing stairs, carrying groceries, or moving a chair? Mostly   How often have you been bothered by emotional problems such as feeling anxious, depressed or irritable? Sometimes   How would you rate your fatigue on average? Moderate   How would you rate your pain on average?   0 = No Pain  " to  10 = Worst Imaginable Pain 4   In general, would you say your health is: 4   In general, would you say your quality of life is: 4   In general, how would you rate your physical health? 3   In general, how would you rate your mental health, including your mood and your ability to think? 4   In general, how would you rate your satisfaction with your social activities and relationships? 3   In general, please rate how well you carry out your usual social activities and roles. (This includes activities at home, at work and in your community, and responsibilities as a parent, child, spouse, employee, friend, etc.) 3   To what extent are you able to carry out your everyday physical activities such as walking, climbing stairs, carrying groceries, or moving a chair? 4   In the past 7 days, how often have you been bothered by emotional problems such as feeling anxious, depressed, or irritable? 3   In the past 7 days, how would you rate your fatigue on average? 3   In the past 7 days, how would you rate your pain on average, where 0 means no pain, and 10 means worst imaginable pain? 4   Global Mental Health Score 14   Global Physical Health Score 13   PROMIS TOTAL - SUBSCORES 27   Some recent data might be hidden                Jackelin Diallo LPN

## 2022-11-11 ENCOUNTER — THERAPY VISIT (OUTPATIENT)
Dept: PHYSICAL THERAPY | Facility: CLINIC | Age: 32
End: 2022-11-11
Payer: COMMERCIAL

## 2022-11-11 DIAGNOSIS — M42.00 SCHEUERMANN'S KYPHOSIS: ICD-10-CM

## 2022-11-11 DIAGNOSIS — M54.6 BILATERAL THORACIC BACK PAIN: ICD-10-CM

## 2022-11-11 PROCEDURE — 97161 PT EVAL LOW COMPLEX 20 MIN: CPT | Mod: GP | Performed by: PHYSICAL THERAPIST

## 2022-11-11 PROCEDURE — 97110 THERAPEUTIC EXERCISES: CPT | Mod: GP | Performed by: PHYSICAL THERAPIST

## 2022-11-11 NOTE — PROGRESS NOTES
[facility]    OUTPATIENT Physical Therapy ORTHOPEDIC EVALUATION  PLAN OF TREATMENT FOR OUTPATIENT REHABILITATION  (COMPLETE FOR INITIAL CLAIMS ONLY)  Patient's Last Name, First Name, M.I.  YOB: 1990  Rose Ronquillo    Provider s Name:  [facility]   Medical Record No.  [id number]   Start of Care Date:  11/11/22   Onset Date:   10/27/22   Treatment Diagnosis:  Mid Back Pain Medical Diagnosis:  Scheuermann's kyphosis       Goals:     11/11/22 0500   Body Part   Goals listed below are for Mid back Pain   Goal #1   Goal #1 sitting   Previous Functional Level No restrictions   Current Functional Level Hours patient can sit   Performance level <1 hour without increased pain of >3/10 pain   STG Target Performance Minutes patient will be able to sit   Performance level >1 hour with <3/10 pain   Rationale for personal hygiene;to allow rest from standing;for community transportation   Due date 11/25/22   LTG Target Performance Hours patient will be able to sit   Performance Level >1 hour with <2/10 pain   Rationale for personal hygiene;to allow rest from standing;for community transportation;for job requirements in their work place   Due date 01/20/23       Therapy Frequency:  2x per week  Predicted Duration of Therapy Intervention:  10 weeks    Shannon Lane, PT                 I CERTIFY THE NEED FOR THESE SERVICES FURNISHED UNDER        THIS PLAN OF TREATMENT AND WHILE UNDER MY CARE     (Physician attestation of this document indicates review and certification of the therapy plan).                     Certification Date From:  11/11/22   Certification Date To:  01/20/23    Referring Provider:  Wong Culp    Initial Assessment        See Epic Evaluation SOC Date: 11/11/22

## 2022-11-11 NOTE — PROGRESS NOTES
"Physical Therapy Initial Evaluation  Subjective:  The history is provided by the patient. No  was used.   Patient Health History  Rose Ronquillo being seen for Mid Back Pain.     Date of Onset: chronic pain since 10 years old.   Problem occurred: Insideous onset   Pain is reported as 3/10 on pain scale.  General health as reported by patient is good.  Health conditions: mental illness, migraines, numbness/tingling, asthma, concussions, heart problems.   Other pertinent conditions: numbness into B arms.  Medical allergies: \"illin\" medications and compazine.   Surgeries include:  Other.    Current medications:  Anti-depressants and pain medication.    Current occupation is  student, .   Primary job tasks include:  Computer work, lifting/carrying, prolonged sitting, prolonged standing, pushing/pulling and repetitive tasks.                  Therapist Generated HPI Evaluation         Type of problem:  Thoracic.    This is a chronic condition.  Condition occurred with:  Insidious onset.  Where condition occurred: for unknown reasons.  Patient reports pain:  Central lumbar spine and upper thoracic spine (neck pain).  Pain is described as aching, sharp and shooting and is constant.  Pain radiates to:  No radiation.   Since onset symptoms are gradually worsening.  Associated symptoms:  Loss of strength. Exacerbated by: certain positions, bending forwards to assist with clients.  Relieved by: changing positions.  Special tests included:  X-ray (see recent X-ray from Dr. Culp, scoliosis and structural changes).    Restrictions due to condition include:  Working in normal job without restrictions.  Barriers include:  None as reported by patient.    Goals: be able to return to cross fit, currently painful and not performing as much                      Objective:  System         Lumbar/SI Evaluation  ROM:    AROM Lumbar:   Flexion:          Full, painfree  Ext:                    Full, " painful   Side Bend:        Left:     Right:   Rotation:           Left:     Right:   Side Glide:        Left:  Full, painful    Right:  Full, painful      AROM Thoracic:  Flex:               Nil loss, painfree  Ext:                Mod loss, painful  Rotation:        Left:  Mod loss, painful    Right:  Mod loss, painful     Strength: 2+/5 B Lower Extremity lowering test                              Shoulder Evaluation:  ROM:  AROM:  normal                                  Strength:      Extension:  Left: 3+/5    Pain:    Right: 3-/5    Pain:          Horizontal Abduction:  Right:3-/5    Pain:  Horizontal Adduction:  Left:3+/5     Pain:                                                   General     ROS    Assessment/Plan:    Patient is a 32 year old female with thoracic and lumbar complaints.    Patient has the following significant findings with corresponding treatment plan.                Diagnosis 1:  Thoracic Pain  Pain -  self management, education and home program  Decreased ROM/flexibility - manual therapy and therapeutic exercise  Decreased strength - therapeutic exercise and therapeutic activities  Impaired gait - gait training  Decreased function - therapeutic activities  Impaired posture - neuro re-education    Therapy Evaluation Codes:   1) History comprised of:   Personal factors that impact the plan of care:      Time since onset of symptoms.    Comorbidity factors that impact the plan of care are:      None.     Medications impacting care: None.  2) Examination of Body Systems comprised of:   Body structures and functions that impact the plan of care:      lumbar, thoracic spine, BUE.   Activity limitations that impact the plan of care are:      standing, walking, sitting.  3) Clinical presentation characteristics are:   Evolving/Changing.  4) Decision-Making    Moderate complexity using standardized patient assessment instrument and/or measureable assessment of functional outcome.  Cumulative Therapy  Evaluation is: Moderate complexity.    Previous and current functional limitations:  (See Goal Flow Sheet for this information)    Short term and Long term goals: (See Goal Flow Sheet for this information)     Communication ability:  Patient appears to be able to clearly communicate and understand verbal and written communication and follow directions correctly.  Treatment Explanation - The following has been discussed with the patient:   RX ordered/plan of care  Anticipated outcomes  Possible risks and side effects  This patient would benefit from PT intervention to resume normal activities.   Rehab potential is good.    Frequency:  2 X week, once daily  Duration:  for 10 weeks  Discharge Plan:  Achieve all LTG.  Independent in home treatment program.  Reach maximal therapeutic benefit.    Please refer to the daily flowsheet for treatment today, total treatment time and time spent performing 1:1 timed codes.

## 2022-11-14 ENCOUNTER — THERAPY VISIT (OUTPATIENT)
Dept: PHYSICAL THERAPY | Facility: CLINIC | Age: 32
End: 2022-11-14
Payer: COMMERCIAL

## 2022-11-14 DIAGNOSIS — M54.6 BILATERAL THORACIC BACK PAIN: Primary | ICD-10-CM

## 2022-11-14 PROCEDURE — 97140 MANUAL THERAPY 1/> REGIONS: CPT | Mod: GP | Performed by: PHYSICAL THERAPIST

## 2022-11-14 PROCEDURE — 97110 THERAPEUTIC EXERCISES: CPT | Mod: GP | Performed by: PHYSICAL THERAPIST

## 2022-11-14 PROCEDURE — 97112 NEUROMUSCULAR REEDUCATION: CPT | Mod: GP | Performed by: PHYSICAL THERAPIST

## 2022-11-16 ENCOUNTER — ANCILLARY PROCEDURE (OUTPATIENT)
Dept: MRI IMAGING | Facility: CLINIC | Age: 32
End: 2022-11-16
Attending: ORTHOPAEDIC SURGERY
Payer: COMMERCIAL

## 2022-11-16 DIAGNOSIS — M42.00 SCHEUERMANN'S KYPHOSIS: ICD-10-CM

## 2022-11-16 PROCEDURE — 72148 MRI LUMBAR SPINE W/O DYE: CPT | Mod: GC | Performed by: RADIOLOGY

## 2022-11-16 PROCEDURE — 72146 MRI CHEST SPINE W/O DYE: CPT | Performed by: RADIOLOGY

## 2022-11-21 ENCOUNTER — THERAPY VISIT (OUTPATIENT)
Dept: PHYSICAL THERAPY | Facility: CLINIC | Age: 32
End: 2022-11-21
Payer: COMMERCIAL

## 2022-11-21 DIAGNOSIS — M54.6 BILATERAL THORACIC BACK PAIN: Primary | ICD-10-CM

## 2022-11-21 PROCEDURE — 97112 NEUROMUSCULAR REEDUCATION: CPT | Mod: GP | Performed by: PHYSICAL THERAPIST

## 2022-11-21 PROCEDURE — 97110 THERAPEUTIC EXERCISES: CPT | Mod: GP | Performed by: PHYSICAL THERAPIST

## 2022-12-14 ENCOUNTER — THERAPY VISIT (OUTPATIENT)
Dept: PHYSICAL THERAPY | Facility: CLINIC | Age: 32
End: 2022-12-14
Payer: COMMERCIAL

## 2022-12-14 DIAGNOSIS — M54.6 BILATERAL THORACIC BACK PAIN: Primary | ICD-10-CM

## 2022-12-14 PROCEDURE — 97112 NEUROMUSCULAR REEDUCATION: CPT | Mod: GP | Performed by: PHYSICAL THERAPIST

## 2022-12-14 PROCEDURE — 97110 THERAPEUTIC EXERCISES: CPT | Mod: GP | Performed by: PHYSICAL THERAPIST

## 2022-12-16 ENCOUNTER — THERAPY VISIT (OUTPATIENT)
Dept: PHYSICAL THERAPY | Facility: CLINIC | Age: 32
End: 2022-12-16
Payer: COMMERCIAL

## 2022-12-16 DIAGNOSIS — M54.6 ACUTE BILATERAL THORACIC BACK PAIN: Primary | ICD-10-CM

## 2022-12-16 PROCEDURE — 97112 NEUROMUSCULAR REEDUCATION: CPT | Mod: GP | Performed by: PHYSICAL THERAPIST

## 2022-12-16 PROCEDURE — 97110 THERAPEUTIC EXERCISES: CPT | Mod: GP | Performed by: PHYSICAL THERAPIST

## 2022-12-19 ENCOUNTER — THERAPY VISIT (OUTPATIENT)
Dept: PHYSICAL THERAPY | Facility: CLINIC | Age: 32
End: 2022-12-19
Payer: COMMERCIAL

## 2022-12-19 DIAGNOSIS — M54.6 BILATERAL THORACIC BACK PAIN: Primary | ICD-10-CM

## 2022-12-19 PROCEDURE — 97110 THERAPEUTIC EXERCISES: CPT | Mod: GP | Performed by: PHYSICAL THERAPIST

## 2022-12-19 PROCEDURE — 97112 NEUROMUSCULAR REEDUCATION: CPT | Mod: GP | Performed by: PHYSICAL THERAPIST

## 2022-12-27 ENCOUNTER — THERAPY VISIT (OUTPATIENT)
Dept: PHYSICAL THERAPY | Facility: CLINIC | Age: 32
End: 2022-12-27
Payer: COMMERCIAL

## 2022-12-27 DIAGNOSIS — M54.6 BILATERAL THORACIC BACK PAIN: Primary | ICD-10-CM

## 2022-12-27 PROCEDURE — 97110 THERAPEUTIC EXERCISES: CPT | Mod: GP | Performed by: PHYSICAL THERAPIST

## 2022-12-29 ENCOUNTER — THERAPY VISIT (OUTPATIENT)
Dept: PHYSICAL THERAPY | Facility: CLINIC | Age: 32
End: 2022-12-29
Payer: COMMERCIAL

## 2022-12-29 DIAGNOSIS — M54.6 BILATERAL THORACIC BACK PAIN: Primary | ICD-10-CM

## 2022-12-29 PROCEDURE — 97112 NEUROMUSCULAR REEDUCATION: CPT | Mod: GP | Performed by: PHYSICAL THERAPIST

## 2022-12-29 PROCEDURE — 97110 THERAPEUTIC EXERCISES: CPT | Mod: GP | Performed by: PHYSICAL THERAPIST

## 2023-01-23 DIAGNOSIS — M42.00 SCHEUERMANN'S KYPHOSIS: ICD-10-CM

## 2023-01-23 DIAGNOSIS — G89.29 CHRONIC MIDLINE THORACIC BACK PAIN: Primary | ICD-10-CM

## 2023-01-23 DIAGNOSIS — M54.6 CHRONIC MIDLINE THORACIC BACK PAIN: Primary | ICD-10-CM

## 2023-01-25 ENCOUNTER — OFFICE VISIT (OUTPATIENT)
Dept: ORTHOPEDICS | Facility: CLINIC | Age: 33
End: 2023-01-25
Payer: COMMERCIAL

## 2023-01-25 ENCOUNTER — ANCILLARY PROCEDURE (OUTPATIENT)
Dept: GENERAL RADIOLOGY | Facility: CLINIC | Age: 33
End: 2023-01-25
Attending: ORTHOPAEDIC SURGERY
Payer: COMMERCIAL

## 2023-01-25 DIAGNOSIS — M42.00 SCHEUERMANN'S KYPHOSIS: Primary | ICD-10-CM

## 2023-01-25 PROCEDURE — 99214 OFFICE O/P EST MOD 30 MIN: CPT | Performed by: ORTHOPAEDIC SURGERY

## 2023-01-25 PROCEDURE — 72100 X-RAY EXAM L-S SPINE 2/3 VWS: CPT | Performed by: STUDENT IN AN ORGANIZED HEALTH CARE EDUCATION/TRAINING PROGRAM

## 2023-01-25 ASSESSMENT — ENCOUNTER SYMPTOMS
VOMITING: 0
MUSCLE CRAMPS: 1
NERVOUS/ANXIOUS: 1
NECK MASS: 0
TROUBLE SWALLOWING: 0
BLOATING: 1
ABDOMINAL PAIN: 1
SINUS CONGESTION: 1
JAUNDICE: 0
JOINT SWELLING: 0
BLOOD IN STOOL: 0
SINUS PAIN: 1
MYALGIAS: 1
NUMBNESS: 1
HOARSE VOICE: 0
SORE THROAT: 0
HOT FLASHES: 0
STIFFNESS: 1
BOWEL INCONTINENCE: 0
DEPRESSION: 0
WEAKNESS: 1
TREMORS: 0
SMELL DISTURBANCE: 0
LOSS OF CONSCIOUSNESS: 0
INSOMNIA: 1
SKIN CHANGES: 0
RECTAL PAIN: 0
SEIZURES: 0
DECREASED CONCENTRATION: 1
MUSCLE WEAKNESS: 1
PANIC: 0
HEARTBURN: 0
NECK PAIN: 1
NAUSEA: 1
SPEECH CHANGE: 0
DIZZINESS: 0
DIARRHEA: 1
TINGLING: 1
DECREASED LIBIDO: 0
CONSTIPATION: 1
ARTHRALGIAS: 1
MEMORY LOSS: 0
POOR WOUND HEALING: 0
DISTURBANCES IN COORDINATION: 1
HEADACHES: 1
TASTE DISTURBANCE: 0
BACK PAIN: 1
NAIL CHANGES: 0
PARALYSIS: 0

## 2023-01-25 NOTE — LETTER
1/25/2023         RE: Rose Ronquillo  [address] [id number]        Dear Colleague,    Thank you for referring your patient, Rose Ronquillo, to the [facility] [place]. Please see a copy of my visit note below.    HISTORY OF PRESENT ILLNESS:  Rose comes in with her mother today for followup.  She has Scheuermann's kyphosis and she has symptoms.  Currently, her visual analog pain scale is a 2.  Her Oswestry score is 26.  She is in part concerned because her great-aunt had significant progressive kyphoscoliosis, requiring very significant surgery at an older age.    No new physical exam is performed today.  We together reviewed her preexisting EOS images, her MRI of her thoracic and lumbar spine, and also obtained and reviewed by me were flexion/extension films of her lumbar spine.  The flexion/extension films show that she is maximally extending in order to stand up for her thoracolumbar Scheuermann's.  She did comment that she cannot get up out of a chair unassisted, and I watched her try to get up out of the chair and what she does is hyperflex her upper spine in an attempt to under-lordose her lumbar spine and then stands up.  When she tries to do it without pushing up on the arms, she experiences fasciculations in both upper extremities.    The MRI shows a T12-L1 left posterolateral disk prominence, but she is not having clear radicular pain from this.  The remainder of the supine lumbar MRI spine does not show spinal stenosis, central, lateral recess or foraminal, and we have reviewed this together.  We talked in some detail about Scheuermann's, Scheuermann's surgery, nonoperative management, and I am recommending continuing with physical therapy.  We talked about nonsteroidal anti-inflammatories.  She has some challenges with GI distress, and we talked about trying to work to get on a once-a-day nonsteroidal, such as meloxicam, but that she will have to work  with her primary about this to do a trial and error effort to see what it takes.  I do think it would be appropriate for her to follow up in 6-12 months to reassess where things are going and get EOS imaging at that time, just to make sure there is not a progressive deformity that is becoming more problematic.  We talked in some detail about the Oswestry Disability Index questionnaire and that normally people having this kind of surgery are scoring between a 40 to a 60, and she is only at a 26 at this point, so I would not recommend the magnitude of surgery that it would take to fix her Scheuermann's given her current level of symptoms.  We also talked about bone health and bone density and strategies for this as well.  She and her mom had questions and had them answered to their apparent satisfaction.    Total contact time day of visit >45 minutes.  Wong Culp MD    Answers for HPI/ROS submitted by the patient on 1/25/2023  General Symptoms: No  Skin Symptoms: Yes  HENT Symptoms: Yes  EYE SYMPTOMS: No  HEART SYMPTOMS: No  LUNG SYMPTOMS: No  INTESTINAL SYMPTOMS: Yes  URINARY SYMPTOMS: No  GYNECOLOGIC SYMPTOMS: Yes  BREAST SYMPTOMS: No  SKELETAL SYMPTOMS: Yes  BLOOD SYMPTOMS: No  NERVOUS SYSTEM SYMPTOMS: Yes  MENTAL HEALTH SYMPTOMS: Yes  Ear pain: No  Ear discharge: No  Hearing loss: No  Tinnitus: No  Nosebleeds: No  Congestion: Yes  Sinus pain: Yes  Trouble swallowing: No   Voice hoarseness: No  Mouth sores: No  Sore throat: No  Tooth pain: No  Gum tenderness: Yes  Bleeding gums: No  Change in taste: No  Change in sense of smell: No  Dry mouth: No  Hearing aid used: No  Neck lump: No  Changes in hair: No  Changes in moles/birth marks: No  Itching: No  Rashes: No  Changes in nails: No  Acne: Yes  Hair in places you don't want it: Yes  Change in facial hair: Yes  Warts: No  Non-healing sores: No  Scarring: No  Flaking of skin: No  Color changes of hands/feet in cold : Yes  Sun sensitivity: No  Skin thickening:  No  Heart burn or indigestion: No  Nausea: Yes  Vomiting: No  Abdominal pain: Yes  Bloating: Yes  Constipation: Yes  Diarrhea: Yes  Blood in stool: No  Black stools: No  Rectal or Anal pain: No  Fecal incontinence: No  Yellowing of skin or eyes: No  Vomit with blood: No  Change in stools: Yes  Bleeding or spotting between periods: No  Heavy or painful periods: Yes  Irregular periods: Yes  Vaginal discharge: No  Hot flashes: No  Vaginal dryness: No  Genital ulcers: No  Reduced libido: No  Painful intercourse: No  Difficulty with sexual arousal: No  Post-menopausal bleeding: No  Back pain: Yes  Muscle aches: Yes  Neck pain: Yes  Swollen joints: No  Joint pain: Yes  Bone pain: No  Muscle cramps: Yes  Muscle weakness: Yes  Joint stiffness: Yes  Bone fracture: No  Trouble with coordination: Yes  Dizziness or trouble with balance: No  Fainting or black-out spells: No  Memory loss: No  Headache: Yes  Seizures: No  Speech problems: No  Tingling: Yes  Tremor: No  Weakness: Yes  Difficulty walking: No  Paralysis: No  Numbness: Yes  Nervous or Anxious: Yes  Depression: No  Trouble sleeping: Yes  Trouble thinking or concentrating: Yes  Mood changes: No  Panic attacks: No

## 2023-01-25 NOTE — PROGRESS NOTES
HISTORY OF PRESENT ILLNESS:  Rose comes in with her mother today for followup.  She has Scheuermann's kyphosis and she has symptoms.  Currently, her visual analog pain scale is a 2.  Her Oswestry score is 26.  She is in part concerned because her great-aunt had significant progressive kyphoscoliosis, requiring very significant surgery at an older age.    No new physical exam is performed today.  We together reviewed her preexisting EOS images, her MRI of her thoracic and lumbar spine, and also obtained and reviewed by me were flexion/extension films of her lumbar spine.  The flexion/extension films show that she is maximally extending in order to stand up for her thoracolumbar Scheuermann's.  She did comment that she cannot get up out of a chair unassisted, and I watched her try to get up out of the chair and what she does is hyperflex her upper spine in an attempt to under-lordose her lumbar spine and then stands up.  When she tries to do it without pushing up on the arms, she experiences fasciculations in both upper extremities.    The MRI shows a T12-L1 left posterolateral disk prominence, but she is not having clear radicular pain from this.  The remainder of the supine lumbar MRI spine does not show spinal stenosis, central, lateral recess or foraminal, and we have reviewed this together.  We talked in some detail about Scheuermann's, Scheuermann's surgery, nonoperative management, and I am recommending continuing with physical therapy.  We talked about nonsteroidal anti-inflammatories.  She has some challenges with GI distress, and we talked about trying to work to get on a once-a-day nonsteroidal, such as meloxicam, but that she will have to work with her primary about this to do a trial and error effort to see what it takes.  I do think it would be appropriate for her to follow up in 6-12 months to reassess where things are going and get EOS imaging at that time, just to make sure there is not a  progressive deformity that is becoming more problematic.  We talked in some detail about the Oswestry Disability Index questionnaire and that normally people having this kind of surgery are scoring between a 40 to a 60, and she is only at a 26 at this point, so I would not recommend the magnitude of surgery that it would take to fix her Scheuermann's given her current level of symptoms.  We also talked about bone health and bone density and strategies for this as well.  She and her mom had questions and had them answered to their apparent satisfaction.    Total contact time day of visit >45 minutes.  Wong Culp MD    Answers for HPI/ROS submitted by the patient on 1/25/2023  General Symptoms: No  Skin Symptoms: Yes  HENT Symptoms: Yes  EYE SYMPTOMS: No  HEART SYMPTOMS: No  LUNG SYMPTOMS: No  INTESTINAL SYMPTOMS: Yes  URINARY SYMPTOMS: No  GYNECOLOGIC SYMPTOMS: Yes  BREAST SYMPTOMS: No  SKELETAL SYMPTOMS: Yes  BLOOD SYMPTOMS: No  NERVOUS SYSTEM SYMPTOMS: Yes  MENTAL HEALTH SYMPTOMS: Yes  Ear pain: No  Ear discharge: No  Hearing loss: No  Tinnitus: No  Nosebleeds: No  Congestion: Yes  Sinus pain: Yes  Trouble swallowing: No   Voice hoarseness: No  Mouth sores: No  Sore throat: No  Tooth pain: No  Gum tenderness: Yes  Bleeding gums: No  Change in taste: No  Change in sense of smell: No  Dry mouth: No  Hearing aid used: No  Neck lump: No  Changes in hair: No  Changes in moles/birth marks: No  Itching: No  Rashes: No  Changes in nails: No  Acne: Yes  Hair in places you don't want it: Yes  Change in facial hair: Yes  Warts: No  Non-healing sores: No  Scarring: No  Flaking of skin: No  Color changes of hands/feet in cold : Yes  Sun sensitivity: No  Skin thickening: No  Heart burn or indigestion: No  Nausea: Yes  Vomiting: No  Abdominal pain: Yes  Bloating: Yes  Constipation: Yes  Diarrhea: Yes  Blood in stool: No  Black stools: No  Rectal or Anal pain: No  Fecal incontinence: No  Yellowing of skin or eyes:  No  Vomit with blood: No  Change in stools: Yes  Bleeding or spotting between periods: No  Heavy or painful periods: Yes  Irregular periods: Yes  Vaginal discharge: No  Hot flashes: No  Vaginal dryness: No  Genital ulcers: No  Reduced libido: No  Painful intercourse: No  Difficulty with sexual arousal: No  Post-menopausal bleeding: No  Back pain: Yes  Muscle aches: Yes  Neck pain: Yes  Swollen joints: No  Joint pain: Yes  Bone pain: No  Muscle cramps: Yes  Muscle weakness: Yes  Joint stiffness: Yes  Bone fracture: No  Trouble with coordination: Yes  Dizziness or trouble with balance: No  Fainting or black-out spells: No  Memory loss: No  Headache: Yes  Seizures: No  Speech problems: No  Tingling: Yes  Tremor: No  Weakness: Yes  Difficulty walking: No  Paralysis: No  Numbness: Yes  Nervous or Anxious: Yes  Depression: No  Trouble sleeping: Yes  Trouble thinking or concentrating: Yes  Mood changes: No  Panic attacks: No

## 2023-01-25 NOTE — NURSING NOTE
Reason For Visit:   Chief Complaint   Patient presents with     RECHECK     F/U 2 MRIs & PT.  Scheuermanns Kyphosis        Primary MD: Dr. Melani Norman  Ref. MD: Est    ?  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 Yes.  Work status?  Part-time.     Date of injury: No  Type of injury: childhood scoli.     Date of surgery: No  Type of surgery: No.     Smoker: No  Request smoking cessation information: No    There were no vitals taken for this visit.    Pain Assessment  Patient Currently in Pain: Yes  0-10 Pain Scale: 2  Primary Pain Location: Back    Oswestry ([place]) Questionnaire    OSWESTRY DISABILITY INDEX 1/25/2023   Count 10   Sum 13   Oswestry Score (%) 26   Some recent data might be hidden        Visual Analog Pain Scale  Back Pain Scale 0-10: 2  Right leg pain: 0  Left leg pain: 0  Neck Pain Scale 0-10: 0  Right arm pain: 0  Left arm pain: 0    Promis 10 Assessment    PROMIS 10 1/25/2023   In general, would you say your health is: Good   In general, would you say your quality of life is: Good   In general, how would you rate your physical health? Fair   In general, how would you rate your mental health, including your mood and your ability to think? Good   In general, how would you rate your satisfaction with your social activities and relationships? Very good   In general, please rate how well you carry out your usual social activities and roles Very good   To what extent are you able to carry out your everyday physical activities such as walking, climbing stairs, carrying groceries, or moving a chair? Moderately   How often have you been bothered by emotional problems such as feeling anxious, depressed or irritable? Often   How would you rate your fatigue on average? Moderate   How would you rate your pain on average?   0 = No Pain  to  10 = Worst Imaginable Pain 3   In general, would you say your health is: 3   In general, would you say your quality of life is: 3   In general, how would  you rate your physical health? 2   In general, how would you rate your mental health, including your mood and your ability to think? 3   In general, how would you rate your satisfaction with your social activities and relationships? 4   In general, please rate how well you carry out your usual social activities and roles. (This includes activities at home, at work and in your community, and responsibilities as a parent, child, spouse, employee, friend, etc.) 4   To what extent are you able to carry out your everyday physical activities such as walking, climbing stairs, carrying groceries, or moving a chair? 3   In the past 7 days, how often have you been bothered by emotional problems such as feeling anxious, depressed, or irritable? 4   In the past 7 days, how would you rate your fatigue on average? 3   In the past 7 days, how would you rate your pain on average, where 0 means no pain, and 10 means worst imaginable pain? 3   Global Mental Health Score 12   Global Physical Health Score 12   PROMIS TOTAL - SUBSCORES 24   Some recent data might be hidden                Jackelin Diallo LPN

## 2023-01-27 ENCOUNTER — THERAPY VISIT (OUTPATIENT)
Dept: PHYSICAL THERAPY | Facility: CLINIC | Age: 33
End: 2023-01-27
Payer: COMMERCIAL

## 2023-01-27 DIAGNOSIS — M54.6 BILATERAL THORACIC BACK PAIN: Primary | ICD-10-CM

## 2023-01-27 PROCEDURE — 97110 THERAPEUTIC EXERCISES: CPT | Mod: GP | Performed by: PHYSICAL THERAPIST

## 2023-01-27 PROCEDURE — 97112 NEUROMUSCULAR REEDUCATION: CPT | Mod: GP | Performed by: PHYSICAL THERAPIST

## 2023-01-27 NOTE — PROGRESS NOTES
Assessment/Plan:    PROGRESS  REPORT    Progress reporting period is from 11/11/2022 to 1/27/2023.       SUBJECTIVE  Subjective changes noted by patient: Subjective: Pt reports still everytime she sits down, has a hard time getting feeling back into her legs upon transferring to standing. Pt reports working hard on her core right now and has been exercising consistently. Pt reports core is stronger, but pain is not getting better.Notes having more headaches recently due to feeling more aligned. pt reports overall pain has not changed but happy that strength is improving.  Changes in function:  Yes, initial changes, however recently plateaued, see goals section.  Adverse reaction to treatment or activity: None    OBJECTIVE  Changes noted in objective findings:  Yes, improved strength.  Objective: Hip Adductor Strength: 3-/5 RLE, 4-/5 LLE. Pt notes decreased glute activation with single leg LLE, trandelenburg with SLS. pt observed assistance after performing any kind of hip hinge to stand erect afterwards, pt indicates less feeling in the legs with this position.     ASSESSMENT/PLAN  Updated problem list and treatment plan: Diagnosis 1:  Scheuermann's Kyphosis  Pain -  self management, education and home program  Decreased strength - therapeutic exercise and therapeutic activities  Decreased function - therapeutic activities  STG/LTGs have been met or progress has been made towards goals:  Yes (See Goal flow sheet completed today.)  Assessment of Progress: The patient's condition has potential to improve.  Self Management Plans:  Patient has been instructed in a home treatment program.  I have re-evaluated this patient and find that the nature, scope, duration and intensity of the therapy is appropriate for the medical condition of the patient.  Rose continues to require the following intervention to meet STG and LTG's:  PT    Recommendations:  This patient would benefit from continued therapy.     Frequency:  1 X  week, once daily  Duration:  for 12 weeks        Please refer to the daily flowsheet for treatment today, total treatment time and time spent performing 1:1 timed codes.

## 2023-02-03 ENCOUNTER — THERAPY VISIT (OUTPATIENT)
Dept: PHYSICAL THERAPY | Facility: CLINIC | Age: 33
End: 2023-02-03
Payer: COMMERCIAL

## 2023-02-03 DIAGNOSIS — M54.6 BILATERAL THORACIC BACK PAIN: Primary | ICD-10-CM

## 2023-02-03 PROCEDURE — 97140 MANUAL THERAPY 1/> REGIONS: CPT | Mod: GP | Performed by: PHYSICAL THERAPIST

## 2023-02-03 PROCEDURE — 97110 THERAPEUTIC EXERCISES: CPT | Mod: GP | Performed by: PHYSICAL THERAPIST

## 2023-02-13 ENCOUNTER — TELEPHONE (OUTPATIENT)
Dept: ORTHOPEDICS | Facility: CLINIC | Age: 33
End: 2023-02-13
Payer: COMMERCIAL

## 2023-02-13 NOTE — TELEPHONE ENCOUNTER
Pt & Mom called C/O Bialt legs numbness & weakness that started about 2 weeks before her appt in jan but they think they forgot to tell  about it & she wants to discuss more the timing of surgery in future.  Denies incontinence of bowel or bladder.  Made appt including EOS XR in lobby  for when  is back. Call back prn.  They agreed.  Lilibeth Chávez RN.

## 2023-02-17 ENCOUNTER — THERAPY VISIT (OUTPATIENT)
Dept: PHYSICAL THERAPY | Facility: CLINIC | Age: 33
End: 2023-02-17
Payer: COMMERCIAL

## 2023-02-17 DIAGNOSIS — M54.6 BILATERAL THORACIC BACK PAIN: Primary | ICD-10-CM

## 2023-02-17 PROCEDURE — 97140 MANUAL THERAPY 1/> REGIONS: CPT | Mod: GP | Performed by: PHYSICAL THERAPIST

## 2023-02-17 PROCEDURE — 97110 THERAPEUTIC EXERCISES: CPT | Mod: GP | Performed by: PHYSICAL THERAPIST

## 2023-03-03 ENCOUNTER — THERAPY VISIT (OUTPATIENT)
Dept: PHYSICAL THERAPY | Facility: CLINIC | Age: 33
End: 2023-03-03
Payer: COMMERCIAL

## 2023-03-03 DIAGNOSIS — M54.6 BILATERAL THORACIC BACK PAIN: Primary | ICD-10-CM

## 2023-03-03 PROCEDURE — 97140 MANUAL THERAPY 1/> REGIONS: CPT | Mod: GP | Performed by: PHYSICAL THERAPIST

## 2023-03-03 PROCEDURE — 97110 THERAPEUTIC EXERCISES: CPT | Mod: GP | Performed by: PHYSICAL THERAPIST

## 2023-03-10 ENCOUNTER — THERAPY VISIT (OUTPATIENT)
Dept: PHYSICAL THERAPY | Facility: CLINIC | Age: 33
End: 2023-03-10
Payer: COMMERCIAL

## 2023-03-10 DIAGNOSIS — M54.6 BILATERAL THORACIC BACK PAIN: Primary | ICD-10-CM

## 2023-03-10 PROCEDURE — 97110 THERAPEUTIC EXERCISES: CPT | Mod: GP | Performed by: PHYSICAL THERAPIST

## 2023-03-17 ENCOUNTER — THERAPY VISIT (OUTPATIENT)
Dept: PHYSICAL THERAPY | Facility: CLINIC | Age: 33
End: 2023-03-17
Payer: COMMERCIAL

## 2023-03-17 DIAGNOSIS — M54.6 BILATERAL THORACIC BACK PAIN: Primary | ICD-10-CM

## 2023-03-17 PROCEDURE — 97112 NEUROMUSCULAR REEDUCATION: CPT | Mod: GP | Performed by: PHYSICAL THERAPIST

## 2023-03-17 PROCEDURE — 97110 THERAPEUTIC EXERCISES: CPT | Mod: GP | Performed by: PHYSICAL THERAPIST

## 2023-03-23 DIAGNOSIS — M42.00 SCHEUERMANN'S KYPHOSIS: Primary | ICD-10-CM

## 2023-03-24 ENCOUNTER — THERAPY VISIT (OUTPATIENT)
Dept: PHYSICAL THERAPY | Facility: CLINIC | Age: 33
End: 2023-03-24
Payer: COMMERCIAL

## 2023-03-24 DIAGNOSIS — M54.6 BILATERAL THORACIC BACK PAIN: Primary | ICD-10-CM

## 2023-03-24 PROCEDURE — 97110 THERAPEUTIC EXERCISES: CPT | Mod: GP | Performed by: PHYSICAL THERAPIST

## 2023-03-24 PROCEDURE — 97140 MANUAL THERAPY 1/> REGIONS: CPT | Mod: GP | Performed by: PHYSICAL THERAPIST

## 2023-03-24 NOTE — PROGRESS NOTES
Assessment/Plan:    PROGRESS  REPORT    Progress reporting period is from 11/11/2022 to 3/24/2023.       SUBJECTIVE  Subjective changes noted by patient:   Subjective: Pt reports drink every 1 hour to 1.5 hours. pt reports bowel movements vary between extremely loose to firm due to gut issues. Pt reports she poops 5-6 times per day. pt reports many allergies in ancestry, sensitivities to food groups. Pt reports no dairy and gluten in the diet. pt can hold for longer periods of time.     Changes in function:  None  Adverse reaction to treatment or activity: None    OBJECTIVE  Changes noted in objective findings:  None pt reports arms and legs get stuck, worse symptoms on Monday in which at school she could not move arms/ legs. Completed pelvic exam today with high resting tone of pelvic floor muscles. 5/5 Kegel, education on down training reduced leg numbness and tingling symptoms today.        ASSESSMENT/PLAN  Updated problem list and treatment plan: Diagnosis 1:  Back Pain  Pain -  manual therapy, self management, education and home program  Decreased ROM/flexibility - manual therapy and therapeutic exercise  Impaired muscle performance - neuro re-education  Decreased function - therapeutic activities  STG/LTGs have been met or progress has been made towards goals:  Yes (See Goal flow sheet completed today.)  Assessment of Progress: The patient's condition is improving.  Self Management Plans:  Patient has been instructed in a home treatment program.  I have re-evaluated this patient and find that the nature, scope, duration and intensity of the therapy is appropriate for the medical condition of the patient.  Rose continues to require the following intervention to meet STG and LTG's:  PT    Recommendations:  This patient would benefit from continued therapy.     Frequency:  1-2 X per week, once daily  Duration:  for 2 months to reduce pain, focus more on pelvic floor therapy secondary to sacral  pain.        Please refer to the daily flowsheet for treatment today, total treatment time and time spent performing 1:1 timed codes.

## 2023-03-29 ENCOUNTER — ANCILLARY PROCEDURE (OUTPATIENT)
Dept: GENERAL RADIOLOGY | Facility: CLINIC | Age: 33
End: 2023-03-29
Attending: ORTHOPAEDIC SURGERY
Payer: COMMERCIAL

## 2023-03-29 ENCOUNTER — OFFICE VISIT (OUTPATIENT)
Dept: ORTHOPEDICS | Facility: CLINIC | Age: 33
End: 2023-03-29
Payer: COMMERCIAL

## 2023-03-29 VITALS — HEIGHT: 69 IN | BODY MASS INDEX: 19.4 KG/M2 | WEIGHT: 131 LBS

## 2023-03-29 DIAGNOSIS — M54.6 CHRONIC MIDLINE THORACIC BACK PAIN: ICD-10-CM

## 2023-03-29 DIAGNOSIS — M42.00 SCHEUERMANN'S KYPHOSIS: Primary | ICD-10-CM

## 2023-03-29 DIAGNOSIS — G95.9 MYELOPATHY (H): ICD-10-CM

## 2023-03-29 DIAGNOSIS — M42.00 SCHEUERMANN'S KYPHOSIS: ICD-10-CM

## 2023-03-29 DIAGNOSIS — G89.29 CHRONIC MIDLINE THORACIC BACK PAIN: ICD-10-CM

## 2023-03-29 DIAGNOSIS — M41.55 OTHER SECONDARY SCOLIOSIS, THORACOLUMBAR REGION: ICD-10-CM

## 2023-03-29 PROCEDURE — 99417 PROLNG OP E/M EACH 15 MIN: CPT | Performed by: ORTHOPAEDIC SURGERY

## 2023-03-29 PROCEDURE — 99215 OFFICE O/P EST HI 40 MIN: CPT | Performed by: ORTHOPAEDIC SURGERY

## 2023-03-29 PROCEDURE — 77073 BONE LENGTH STUDIES: CPT | Performed by: STUDENT IN AN ORGANIZED HEALTH CARE EDUCATION/TRAINING PROGRAM

## 2023-03-29 PROCEDURE — 72082 X-RAY EXAM ENTIRE SPI 2/3 VW: CPT | Performed by: STUDENT IN AN ORGANIZED HEALTH CARE EDUCATION/TRAINING PROGRAM

## 2023-03-29 ASSESSMENT — ENCOUNTER SYMPTOMS
ORTHOPNEA: 0
ALTERED TEMPERATURE REGULATION: 1
SORE THROAT: 1
NERVOUS/ANXIOUS: 1
DISTURBANCES IN COORDINATION: 1
ARTHRALGIAS: 1
HYPERTENSION: 0
DEPRESSION: 1
SYNCOPE: 0
SMELL DISTURBANCE: 0
BLOATING: 1
NAIL CHANGES: 0
HYPOTENSION: 0
DECREASED LIBIDO: 1
JAUNDICE: 0
SEIZURES: 0
VOMITING: 0
HALLUCINATIONS: 0
HEADACHES: 1
INSOMNIA: 1
POOR WOUND HEALING: 0
PALPITATIONS: 0
LIGHT-HEADEDNESS: 0
SINUS CONGESTION: 1
DIZZINESS: 0
HOT FLASHES: 1
STIFFNESS: 1
BACK PAIN: 1
NECK PAIN: 1
PANIC: 0
HOARSE VOICE: 0
SLEEP DISTURBANCES DUE TO BREATHING: 0
WEIGHT LOSS: 0
LEG PAIN: 1
FATIGUE: 1
NUMBNESS: 1
TREMORS: 0
PARALYSIS: 1
BRUISES/BLEEDS EASILY: 1
WEIGHT GAIN: 0
NECK MASS: 0
CHILLS: 0
SINUS PAIN: 1
NIGHT SWEATS: 1
EXERCISE INTOLERANCE: 1
FEVER: 0
MUSCLE WEAKNESS: 1
LOSS OF CONSCIOUSNESS: 0
WEAKNESS: 1
HEARTBURN: 0
TROUBLE SWALLOWING: 0
JOINT SWELLING: 0
INCREASED ENERGY: 1
MYALGIAS: 1
ABDOMINAL PAIN: 1
DECREASED CONCENTRATION: 1
SPEECH CHANGE: 0
SWOLLEN GLANDS: 1
DIARRHEA: 1
TINGLING: 1
SKIN CHANGES: 0
RECTAL PAIN: 0
BOWEL INCONTINENCE: 0
POLYPHAGIA: 0
MEMORY LOSS: 0
TASTE DISTURBANCE: 0
MUSCLE CRAMPS: 1
BLOOD IN STOOL: 0
DECREASED APPETITE: 1
NAUSEA: 1
POLYDIPSIA: 0
CONSTIPATION: 1

## 2023-03-29 NOTE — NURSING NOTE
"Reason For Visit:   Chief Complaint   Patient presents with     RECHECK     Increased weakness & Numbness Bilat legs &  Arms & hands F/U PT for Scheuermann's kyphosis        Primary MD: Melani Norman  Ref. MD: Est    ?  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 Yes.  Work status?  Part-time.     Date of injury: No  Type of injury: childhood scoli.     Date of surgery: No  Type of surgery: No.     Smoker: No  Request smoking cessation information: No    Ht 1.758 m (5' 9.21\")   Wt 59.4 kg (131 lb)   BMI 19.23 kg/m      Pain Assessment  Patient Currently in Pain: Yes  0-10 Pain Scale: 4  Primary Pain Location: Back    Oswestry ([facility]) Questionnaire    OSWESTRY DISABILITY INDEX 3/29/2023   Count 10   Sum 20   Oswestry Score (%) 40   Some recent data might be hidden            Visual Analog Pain Scale  Back Pain Scale 0-10: 3.5  Right leg pain: 3.5  Left leg pain: 3.5  Neck Pain Scale 0-10: 3.5  Right arm pain: 3.5  Left arm pain: 3.5    Promis 10 Assessment    PROMIS 10 1/25/2023   In general, would you say your health is: Good   In general, would you say your quality of life is: Good   In general, how would you rate your physical health? Fair   In general, how would you rate your mental health, including your mood and your ability to think? Good   In general, how would you rate your satisfaction with your social activities and relationships? Very good   In general, please rate how well you carry out your usual social activities and roles Very good   To what extent are you able to carry out your everyday physical activities such as walking, climbing stairs, carrying groceries, or moving a chair? Moderately   How often have you been bothered by emotional problems such as feeling anxious, depressed or irritable? Often   How would you rate your fatigue on average? Moderate   How would you rate your pain on average?   0 = No Pain  to  10 = Worst Imaginable Pain 3   In general, would you say your " health is: 3   In general, would you say your quality of life is: 3   In general, how would you rate your physical health? 2   In general, how would you rate your mental health, including your mood and your ability to think? 3   In general, how would you rate your satisfaction with your social activities and relationships? 4   In general, please rate how well you carry out your usual social activities and roles. (This includes activities at home, at work and in your community, and responsibilities as a parent, child, spouse, employee, friend, etc.) 4   To what extent are you able to carry out your everyday physical activities such as walking, climbing stairs, carrying groceries, or moving a chair? 3   In the past 7 days, how often have you been bothered by emotional problems such as feeling anxious, depressed, or irritable? 4   In the past 7 days, how would you rate your fatigue on average? 3   In the past 7 days, how would you rate your pain on average, where 0 means no pain, and 10 means worst imaginable pain? 3   Global Mental Health Score 12   Global Physical Health Score 12   PROMIS TOTAL - SUBSCORES 24   Some recent data might be hidden                Jackelin Diallo LPN

## 2023-03-29 NOTE — LETTER
3/29/2023         RE: Rose Ronquillo  [address] [id number]        Dear Colleague,    Thank you for referring your patient, Rose Ronquillo, to the [facility] [place]. Please see a copy of my visit note below.    HISTORY OF PRESENT ILLNESS:  She returns today for progressive symptoms including increased bilateral lower extremity numbness and weakness clasping and inability to release her right hand as well as followup for her thoracolumbar Scheuermann's kyphosis.  Rose notes that in the past she has had findings consistent with Raynaud's phenomenon.  She describes these symptoms, but not really so much back pain as it is difficulty in getting up out of a chair, as well as when she is holding a fork sometimes she cannot let go of it and has to get someone to help her pry open her hand, and this appears to have been getting worse.    OBJECTIVE:  Physical exam reveals a thin, alert, young adult female in a state of concern.  Evaluating her trying to get up out of a chair, she has a Gowers sign that she has to climb up her legs in order to stand up straight using her hands.  She is unable to even close her eyes to do a Romberg as she starts to fall backwards immediately.  She has a moderate to severe ataxic tandem gait.  On lower extremity she has 4-5 beats of clonus on both lower extremities.  She has 3+ patellar reflexes.  She has negative Crouch's bilaterally in her upper extremities.  She has a bilateral inverted radial reflex.  Her left upper extremity has 1+ biceps and triceps.  Her right upper extremity has a 3+ biceps and triceps.  She has a negative scapulohumeral reflex bilaterally.    IMAGING:  Imaging performed today includes AP and lateral EOS imaging.  This shows the thoracolumbar kyphosis and mild scoliosis.  This has been nonprogressive and is unchanged.  Previously, she has had thoracic and lumbar MRIs but has never had a cervical spine  MRI.        ASSESSMENT AND PLAN:  Neurologic findings concerning for an etiology other than spinal pathology.    PLAN:  I called and spoke to Dr. Cristina Jay and went over these findings with her and discussed how to get her in.  Dr. Jay said to put in an urgent Neurology referral for General Neurology, and we will go ahead and order a cervical and brain MRI to be done in the next week or two, and then see what the Neurology evaluation refers.  I specifically asked whether or not we should have someone like Dr. Moreno evaluate her for a later presentation of a muscular dystrophy type of problem, but we will let the General Neurology evaluation start this process and then go from there.  In addition, we also got Rose's mom, Lexy, on the phone and I went over with her the findings and the plan going forward as well.    I called and consulted with Dr. Moreno mainly because of the Gowers' sign. He indicated he thought that the fastest way to get to a diagnosis would be to get EMG's. Either Dr. Moreno or Jaz would be appropriate. So we did an EMG order today as well. Brain and cervical MRI results from this morning are back and do not lead to a diagnosis. See below.      Addendum:  C spine MRI     EXAM: MR CERVICAL SPINE W/O CONTRAST 3/30/2023 7:13 AM     HISTORY: Neck pain; Neurologic deficit, non-traumatic; None of the  following: Babinski/clonus, balance/gait abnormality, Crouch's sign,  hyperreflexia, or UE weakness; Scheuermann's kyphosis; Chronic midline  thoracic back pain; Chronic midline thoracic back pain; Scoliosis;  Myelopathy (H)     COMPARISON: Same-day brain MR    TECHNIQUE: Sagittal T1-weighted, sagittal STIR, sagittal T2-weighted,  sagittal diffusion weighted, axial and sagittal gradient echo, and  axial T2-weighted images of the cervical spine were obtained without  intravenous contrast.    FINDINGS:  Straightening of the expected cervical lordosis. No  spondylolisthesis..  Normal marrow signal. No loss of intervertebral  disc height. No cord signal abnormality. Diffusion-weighted images are  negative for focal abnormality.    The findings on a level by level basis are as follows:    C2-3: No spinal canal or neural foraminal stenosis.    C3-4: No spinal canal or neural foraminal stenosis.    C4-5: Posterior disc bulge. No significant spinal canal or neural  foraminal narrowing..    C5-6: Posterior disc bulge. No significant spinal canal and neural  foraminal narrowing.    C6-7: No spinal canal or neural foraminal stenosis.    C7-T1: No spinal canal or neural foraminal stenosis.    The visualized skull base, posterior fossa, and paraspinal soft  tissues are within normal limits.    IMPRESSION:  Minimal cervical degenerative changes. No high-grade spinal canal or  neural foraminal narrowing.    I have personally reviewed the examination and initial interpretation  and I agree with the findings.    STEPHEN CONLEY MD           Brain MRI  EXAM: MR BRAIN W/O CONTRAST  3/30/2023 6:49 AM      HISTORY:  Scheuermann's kyphosis; Chronic midline thoracic back pain;  Chronic midline thoracic back pain; Scoliosis; Myelopathy (H)        COMPARISON:  None.     TECHNIQUE: Sagittal T1-weighted, coronal T2-weighted, axial T2 FLAIR,  axial susceptibility weighted, and axial diffusion-weighted with ADC  map images of the brain were obtained without intravenous contrast     FINDINGS:  There is no mass effect, midline shift, or intracranial hemorrhage.  The ventricles are proportionate to the cerebral sulci. Diffusion and  susceptibility weighted images are negative for acute/focal  abnormality. Major intracranial vascular structures are within normal  limits.     No suspicious abnormality of the skull marrow signal. Clear paranasal  sinuses. Mastoid air cells are clear. No focal abnormality of the  pituitary gland, sella, skull base and upper cervical spinal  structures on sagittal images. The orbits  are normal.                                                                      IMPRESSION:  No acute intracranial pathology. Essentially unremarkable  brain MRI.     I have personally reviewed the examination and initial interpretation  and I agree with the findings.     ODALYS KRUSE MD     Answers for HPI/ROS submitted by the patient on 3/29/2023  General Symptoms: Yes  Skin Symptoms: Yes  HENT Symptoms: Yes  EYE SYMPTOMS: No  HEART SYMPTOMS: Yes  LUNG SYMPTOMS: No  INTESTINAL SYMPTOMS: Yes  URINARY SYMPTOMS: No  GYNECOLOGIC SYMPTOMS: Yes  BREAST SYMPTOMS: No  SKELETAL SYMPTOMS: Yes  BLOOD SYMPTOMS: Yes  NERVOUS SYSTEM SYMPTOMS: Yes  MENTAL HEALTH SYMPTOMS: Yes  Ear pain: No  Ear discharge: No  Hearing loss: No  Tinnitus: No  Nosebleeds: No  Congestion: Yes  Sinus pain: Yes  Trouble swallowing: No   Voice hoarseness: No  Mouth sores: No  Sore throat: Yes  Tooth pain: Yes  Gum tenderness: Yes  Bleeding gums: No  Change in taste: No  Change in sense of smell: No  Dry mouth: No  Hearing aid used: No  Neck lump: No  Fever: No  Loss of appetite: Yes  Weight loss: No  Weight gain: No  Fatigue: Yes  Night sweats: Yes  Chills: No  Increased stress: Yes  Excessive hunger: No  Excessive thirst: No  Feeling hot or cold when others believe the temperature is normal: Yes  Loss of height: No  Post-operative complications: No  Surgical site pain: No  Hallucinations: No  Change in or Loss of Energy: Yes  Hyperactivity: Yes  Confusion: No  Changes in hair: No  Changes in moles/birth marks: No  Itching: No  Rashes: No  Changes in nails: No  Acne: Yes  Hair in places you don't want it: Yes  Change in facial hair: No  Warts: No  Non-healing sores: No  Scarring: No  Flaking of skin: No  Color changes of hands/feet in cold : Yes  Sun sensitivity: No  Skin thickening: No  Chest pain or pressure: No  Fast or irregular heartbeat: No  Pain in legs with walking: Yes  Trouble breathing while lying down: No  Fingers or toes appear blue:  No  High blood pressure: No  Low blood pressure: No  Fainting: No  Murmurs: No  Pacemaker: No  Varicose veins: No  Wake up at night with shortness of breath: No  Light-headedness: No  Exercise intolerance: Yes  Heart burn or indigestion: No  Nausea: Yes  Vomiting: No  Abdominal pain: Yes  Bloating: Yes  Constipation: Yes  Diarrhea: Yes  Blood in stool: No  Black stools: No  Rectal or Anal pain: No  Fecal incontinence: No  Yellowing of skin or eyes: No  Vomit with blood: No  Change in stools: No  Bleeding or spotting between periods: No  Heavy or painful periods: Yes  Irregular periods: Yes  Vaginal discharge: Yes  Hot flashes: Yes  Vaginal dryness: No  Genital ulcers: No  Reduced libido: Yes  Painful intercourse: Yes  Difficulty with sexual arousal: No  Post-menopausal bleeding: No  Back pain: Yes  Muscle aches: Yes  Neck pain: Yes  Swollen joints: No  Joint pain: Yes  Bone pain: No  Muscle cramps: Yes  Muscle weakness: Yes  Joint stiffness: Yes  Bone fracture: No  Anemia: No  Swollen glands: Yes  Easy bleeding or bruising: Yes  Trouble with coordination: Yes  Dizziness or trouble with balance: No  Fainting or black-out spells: No  Memory loss: No  Headache: Yes  Seizures: No  Speech problems: No  Tingling: Yes  Tremor: No  Weakness: Yes  Difficulty walking: Yes  Paralysis: Yes  Numbness: Yes  Nervous or Anxious: Yes  Depression: Yes  Trouble sleeping: Yes  Trouble thinking or concentrating: Yes  Mood changes: Yes  Panic attacks: No    Conflicting answers have been found for some questions. Please document the patient's answers manually.       Wong Culp MD

## 2023-03-29 NOTE — PROGRESS NOTES
HISTORY OF PRESENT ILLNESS:  She returns today for progressive symptoms including increased bilateral lower extremity numbness and weakness clasping and inability to release her right hand as well as followup for her thoracolumbar Scheuermann's kyphosis.  Rose notes that in the past she has had findings consistent with Raynaud's phenomenon.  She describes these symptoms, but not really so much back pain as it is difficulty in getting up out of a chair, as well as when she is holding a fork sometimes she cannot let go of it and has to get someone to help her pry open her hand, and this appears to have been getting worse.    OBJECTIVE:  Physical exam reveals a thin, alert, young adult female in a state of concern.  Evaluating her trying to get up out of a chair, she has a Gowers sign that she has to climb up her legs in order to stand up straight using her hands.  She is unable to even close her eyes to do a Romberg as she starts to fall backwards immediately.  She has a moderate to severe ataxic tandem gait.  On lower extremity she has 4-5 beats of clonus on both lower extremities.  She has 3+ patellar reflexes.  She has negative Crouch's bilaterally in her upper extremities.  She has a bilateral inverted radial reflex.  Her left upper extremity has 1+ biceps and triceps.  Her right upper extremity has a 3+ biceps and triceps.  She has a negative scapulohumeral reflex bilaterally.    IMAGING:  Imaging performed today includes AP and lateral EOS imaging.  This shows the thoracolumbar kyphosis and mild scoliosis.  This has been nonprogressive and is unchanged.  Previously, she has had thoracic and lumbar MRIs but has never had a cervical spine MRI.        ASSESSMENT AND PLAN:  Neurologic findings concerning for an etiology other than spinal pathology.    PLAN:  I called and spoke to Dr. Cristina Jay and went over these findings with her and discussed how to get her in.  Dr. Jay said to put in an urgent Neurology  referral for General Neurology, and we will go ahead and order a cervical and brain MRI to be done in the next week or two, and then see what the Neurology evaluation refers.  I specifically asked whether or not we should have someone like Dr. Moreno evaluate her for a later presentation of a muscular dystrophy type of problem, but we will let the General Neurology evaluation start this process and then go from there.  In addition, we also got Rose's mom, Lexy, on the phone and I went over with her the findings and the plan going forward as well.    I called and consulted with Dr. Moreno mainly because of the Gowers' sign. He indicated he thought that the fastest way to get to a diagnosis would be to get EMG's. Either Dr. Moreno or Jaz would be appropriate. So we did an EMG order today as well. Brain and cervical MRI results from this morning are back and do not lead to a diagnosis. See below.      Addendum:  C spine MRI     EXAM: MR CERVICAL SPINE W/O CONTRAST 3/30/2023 7:13 AM     HISTORY: Neck pain; Neurologic deficit, non-traumatic; None of the  following: Babinski/clonus, balance/gait abnormality, Crouch's sign,  hyperreflexia, or UE weakness; Scheuermann's kyphosis; Chronic midline  thoracic back pain; Chronic midline thoracic back pain; Scoliosis;  Myelopathy (H)     COMPARISON: Same-day brain MR    TECHNIQUE: Sagittal T1-weighted, sagittal STIR, sagittal T2-weighted,  sagittal diffusion weighted, axial and sagittal gradient echo, and  axial T2-weighted images of the cervical spine were obtained without  intravenous contrast.    FINDINGS:  Straightening of the expected cervical lordosis. No  spondylolisthesis.. Normal marrow signal. No loss of intervertebral  disc height. No cord signal abnormality. Diffusion-weighted images are  negative for focal abnormality.    The findings on a level by level basis are as follows:    C2-3: No spinal canal or neural foraminal stenosis.    C3-4: No  spinal canal or neural foraminal stenosis.    C4-5: Posterior disc bulge. No significant spinal canal or neural  foraminal narrowing..    C5-6: Posterior disc bulge. No significant spinal canal and neural  foraminal narrowing.    C6-7: No spinal canal or neural foraminal stenosis.    C7-T1: No spinal canal or neural foraminal stenosis.    The visualized skull base, posterior fossa, and paraspinal soft  tissues are within normal limits.    IMPRESSION:  Minimal cervical degenerative changes. No high-grade spinal canal or  neural foraminal narrowing.    I have personally reviewed the examination and initial interpretation  and I agree with the findings.    STEPHEN CONLEY MD           Brain MRI  EXAM: MR BRAIN W/O CONTRAST  3/30/2023 6:49 AM      HISTORY:  Scheuermann's kyphosis; Chronic midline thoracic back pain;  Chronic midline thoracic back pain; Scoliosis; Myelopathy (H)        COMPARISON:  None.     TECHNIQUE: Sagittal T1-weighted, coronal T2-weighted, axial T2 FLAIR,  axial susceptibility weighted, and axial diffusion-weighted with ADC  map images of the brain were obtained without intravenous contrast     FINDINGS:  There is no mass effect, midline shift, or intracranial hemorrhage.  The ventricles are proportionate to the cerebral sulci. Diffusion and  susceptibility weighted images are negative for acute/focal  abnormality. Major intracranial vascular structures are within normal  limits.     No suspicious abnormality of the skull marrow signal. Clear paranasal  sinuses. Mastoid air cells are clear. No focal abnormality of the  pituitary gland, sella, skull base and upper cervical spinal  structures on sagittal images. The orbits are normal.                                                                      IMPRESSION:  No acute intracranial pathology. Essentially unremarkable  brain MRI.     I have personally reviewed the examination and initial interpretation  and I agree with the findings.     ODALYS  "MD URIAH   \"I spent a total of 141 minutes on the day of the visit.\"     Wong Culp MD    Answers for HPI/ROS submitted by the patient on 3/29/2023  General Symptoms: Yes  Skin Symptoms: Yes  HENT Symptoms: Yes  EYE SYMPTOMS: No  HEART SYMPTOMS: Yes  LUNG SYMPTOMS: No  INTESTINAL SYMPTOMS: Yes  URINARY SYMPTOMS: No  GYNECOLOGIC SYMPTOMS: Yes  BREAST SYMPTOMS: No  SKELETAL SYMPTOMS: Yes  BLOOD SYMPTOMS: Yes  NERVOUS SYSTEM SYMPTOMS: Yes  MENTAL HEALTH SYMPTOMS: Yes  Ear pain: No  Ear discharge: No  Hearing loss: No  Tinnitus: No  Nosebleeds: No  Congestion: Yes  Sinus pain: Yes  Trouble swallowing: No   Voice hoarseness: No  Mouth sores: No  Sore throat: Yes  Tooth pain: Yes  Gum tenderness: Yes  Bleeding gums: No  Change in taste: No  Change in sense of smell: No  Dry mouth: No  Hearing aid used: No  Neck lump: No  Fever: No  Loss of appetite: Yes  Weight loss: No  Weight gain: No  Fatigue: Yes  Night sweats: Yes  Chills: No  Increased stress: Yes  Excessive hunger: No  Excessive thirst: No  Feeling hot or cold when others believe the temperature is normal: Yes  Loss of height: No  Post-operative complications: No  Surgical site pain: No  Hallucinations: No  Change in or Loss of Energy: Yes  Hyperactivity: Yes  Confusion: No  Changes in hair: No  Changes in moles/birth marks: No  Itching: No  Rashes: No  Changes in nails: No  Acne: Yes  Hair in places you don't want it: Yes  Change in facial hair: No  Warts: No  Non-healing sores: No  Scarring: No  Flaking of skin: No  Color changes of hands/feet in cold : Yes  Sun sensitivity: No  Skin thickening: No  Chest pain or pressure: No  Fast or irregular heartbeat: No  Pain in legs with walking: Yes  Trouble breathing while lying down: No  Fingers or toes appear blue: No  High blood pressure: No  Low blood pressure: No  Fainting: No  Murmurs: No  Pacemaker: No  Varicose veins: No  Wake up at night with shortness of breath: No  Light-headedness: No  Exercise " intolerance: Yes  Heart burn or indigestion: No  Nausea: Yes  Vomiting: No  Abdominal pain: Yes  Bloating: Yes  Constipation: Yes  Diarrhea: Yes  Blood in stool: No  Black stools: No  Rectal or Anal pain: No  Fecal incontinence: No  Yellowing of skin or eyes: No  Vomit with blood: No  Change in stools: No  Bleeding or spotting between periods: No  Heavy or painful periods: Yes  Irregular periods: Yes  Vaginal discharge: Yes  Hot flashes: Yes  Vaginal dryness: No  Genital ulcers: No  Reduced libido: Yes  Painful intercourse: Yes  Difficulty with sexual arousal: No  Post-menopausal bleeding: No  Back pain: Yes  Muscle aches: Yes  Neck pain: Yes  Swollen joints: No  Joint pain: Yes  Bone pain: No  Muscle cramps: Yes  Muscle weakness: Yes  Joint stiffness: Yes  Bone fracture: No  Anemia: No  Swollen glands: Yes  Easy bleeding or bruising: Yes  Trouble with coordination: Yes  Dizziness or trouble with balance: No  Fainting or black-out spells: No  Memory loss: No  Headache: Yes  Seizures: No  Speech problems: No  Tingling: Yes  Tremor: No  Weakness: Yes  Difficulty walking: Yes  Paralysis: Yes  Numbness: Yes  Nervous or Anxious: Yes  Depression: Yes  Trouble sleeping: Yes  Trouble thinking or concentrating: Yes  Mood changes: Yes  Panic attacks: No    Conflicting answers have been found for some questions. Please document the patient's answers manually.

## 2023-03-30 ENCOUNTER — TELEPHONE (OUTPATIENT)
Dept: ORTHOPEDICS | Facility: CLINIC | Age: 33
End: 2023-03-30

## 2023-03-30 ENCOUNTER — ANCILLARY PROCEDURE (OUTPATIENT)
Dept: MRI IMAGING | Facility: CLINIC | Age: 33
End: 2023-03-30
Attending: ORTHOPAEDIC SURGERY
Payer: COMMERCIAL

## 2023-03-30 DIAGNOSIS — M54.6 CHRONIC MIDLINE THORACIC BACK PAIN: ICD-10-CM

## 2023-03-30 DIAGNOSIS — G95.9 MYELOPATHY (H): ICD-10-CM

## 2023-03-30 DIAGNOSIS — M42.00 SCHEUERMANN'S KYPHOSIS: ICD-10-CM

## 2023-03-30 DIAGNOSIS — G95.9 MYELOPATHY (H): Primary | ICD-10-CM

## 2023-03-30 DIAGNOSIS — M41.9 SCOLIOSIS: ICD-10-CM

## 2023-03-30 DIAGNOSIS — G89.29 CHRONIC MIDLINE THORACIC BACK PAIN: ICD-10-CM

## 2023-03-30 PROCEDURE — 70551 MRI BRAIN STEM W/O DYE: CPT | Mod: GC | Performed by: RADIOLOGY

## 2023-03-30 PROCEDURE — 72141 MRI NECK SPINE W/O DYE: CPT | Mod: GC | Performed by: RADIOLOGY

## 2023-03-30 NOTE — TELEPHONE ENCOUNTER
I called pt & told her  reviewed the 2 URGENT MRIs done this am & he called & spoke directly to  &  in neurology who advised ordering EMGs to be done before New Neurology consult 4-11-23.  Ordered & pt will call [phone] to schedule.    Call back prn. Pt agreed.  ALLI./Lilibeth Hammond RN.

## 2023-03-30 NOTE — TELEPHONE ENCOUNTER
See phone message from pt.  I called pt back.  They scheduled her Urgent EMG for June.    I told her I called & spoke to  who will work with his staff & call her to [facility] to next week with either himself or .  Call back prn.  Pt agreed.    Lilibeth Hammond RN.

## 2023-03-30 NOTE — TELEPHONE ENCOUNTER
Patient is calling back regarding this and said she is unable to get in per the timeframe requested by Dr. Culp. She was told that in order to get in sooner it would have to be a doctor to doctors scheduling. Please call patient back and advise.

## 2023-03-31 ENCOUNTER — TELEPHONE (OUTPATIENT)
Dept: OTHER | Facility: CLINIC | Age: 33
End: 2023-03-31

## 2023-03-31 NOTE — TELEPHONE ENCOUNTER
Referral transferred to vascular [facility] on 3/31/23.    Pt referred to [facility] by Wong Culp MD for Reynaud's.    Pt needs to be scheduled for NEW VASCULAR PATIENT consult with any Vascular Medicine provider.  Will route to scheduling to coordinate an appointment at next available.    Appt note: Ref by Dr. Wong Culp for Raynaud's; notes in Epic.    ELPIDIO HensleyN, RN-[facility] [place]

## 2023-04-05 ENCOUNTER — THERAPY VISIT (OUTPATIENT)
Dept: PHYSICAL THERAPY | Facility: CLINIC | Age: 33
End: 2023-04-05
Payer: COMMERCIAL

## 2023-04-05 DIAGNOSIS — M54.6 BILATERAL THORACIC BACK PAIN: Primary | ICD-10-CM

## 2023-04-05 PROCEDURE — 97140 MANUAL THERAPY 1/> REGIONS: CPT | Mod: GP | Performed by: PHYSICAL THERAPIST

## 2023-04-07 ENCOUNTER — OFFICE VISIT (OUTPATIENT)
Dept: NEUROLOGY | Facility: CLINIC | Age: 33
End: 2023-04-07
Payer: COMMERCIAL

## 2023-04-07 ENCOUNTER — LAB (OUTPATIENT)
Dept: LAB | Facility: CLINIC | Age: 33
End: 2023-04-07
Payer: COMMERCIAL

## 2023-04-07 DIAGNOSIS — M62.89 PROXIMAL WEAKNESS OF EXTREMITY: ICD-10-CM

## 2023-04-07 DIAGNOSIS — R27.0 ATAXIA: Primary | ICD-10-CM

## 2023-04-07 DIAGNOSIS — G95.9 MYELOPATHY (H): ICD-10-CM

## 2023-04-07 DIAGNOSIS — M42.00 SCHEUERMANN'S KYPHOSIS: ICD-10-CM

## 2023-04-07 LAB
CK SERPL-CCNC: 104 U/L (ref 26–192)
FOLATE SERPL-MCNC: 9.1 NG/ML (ref 4.6–34.8)
Lab: NORMAL
PERFORMING LABORATORY: NORMAL
SPECIMEN STATUS: NORMAL
TEST NAME: NORMAL

## 2023-04-07 PROCEDURE — 83921 ORGANIC ACID SINGLE QUANT: CPT | Mod: 90 | Performed by: PATHOLOGY

## 2023-04-07 PROCEDURE — 86618 LYME DISEASE ANTIBODY: CPT | Mod: 90 | Performed by: PATHOLOGY

## 2023-04-07 PROCEDURE — 36415 COLL VENOUS BLD VENIPUNCTURE: CPT | Performed by: PATHOLOGY

## 2023-04-07 PROCEDURE — 84182 PROTEIN WESTERN BLOT TEST: CPT | Performed by: PATHOLOGY

## 2023-04-07 PROCEDURE — 86235 NUCLEAR ANTIGEN ANTIBODY: CPT | Mod: 90 | Performed by: PATHOLOGY

## 2023-04-07 PROCEDURE — 95910 NRV CNDJ TEST 7-8 STUDIES: CPT | Performed by: PSYCHIATRY & NEUROLOGY

## 2023-04-07 PROCEDURE — 86053 AQAPRN-4 ANTB FLO CYTMTRY EA: CPT | Performed by: PATHOLOGY

## 2023-04-07 PROCEDURE — 95885 MUSC TST DONE W/NERV TST LIM: CPT | Performed by: PSYCHIATRY & NEUROLOGY

## 2023-04-07 PROCEDURE — 82525 ASSAY OF COPPER: CPT | Mod: 90 | Performed by: PATHOLOGY

## 2023-04-07 PROCEDURE — 95887 MUSC TST DONE W/N TST NONEXT: CPT | Performed by: PSYCHIATRY & NEUROLOGY

## 2023-04-07 PROCEDURE — 86038 ANTINUCLEAR ANTIBODIES: CPT | Mod: 90 | Performed by: PATHOLOGY

## 2023-04-07 PROCEDURE — 86363 MOG-IGG1 ANTB FLO CYTMTRY EA: CPT | Performed by: PATHOLOGY

## 2023-04-07 PROCEDURE — 86255 FLUORESCENT ANTIBODY SCREEN: CPT | Performed by: PATHOLOGY

## 2023-04-07 PROCEDURE — 82164 ANGIOTENSIN I ENZYME TEST: CPT | Mod: 90 | Performed by: PATHOLOGY

## 2023-04-07 PROCEDURE — 82746 ASSAY OF FOLIC ACID SERUM: CPT | Mod: 90 | Performed by: PATHOLOGY

## 2023-04-07 PROCEDURE — 82550 ASSAY OF CK (CPK): CPT | Performed by: PATHOLOGY

## 2023-04-07 PROCEDURE — 99000 SPECIMEN HANDLING OFFICE-LAB: CPT | Performed by: PATHOLOGY

## 2023-04-07 PROCEDURE — 86780 TREPONEMA PALLIDUM: CPT | Mod: 90 | Performed by: PATHOLOGY

## 2023-04-07 PROCEDURE — 86341 ISLET CELL ANTIBODY: CPT | Performed by: PATHOLOGY

## 2023-04-07 NOTE — PATIENT INSTRUCTIONS
Please call  to schedule your MRIs and your spinal tap. Both will be done by Radiology  Please go to the lab for additional tests today.     We don't have a definite explanation for your symptoms yet. Your EMG was normal.

## 2023-04-07 NOTE — PROGRESS NOTES
[facility]  Electrodiagnostic Laboratory                 Department of Neurology                                                                                                         Test Date:  2023    Patient: Rose Ronquillo : 1990 Physician: Alphonso Shearer MD   Sex: Female AGE: 32 year Ref Phys:    ID#: [id number]   Technician: Damian Lenz     Clinical Information:    32-year-old woman with chief complaint of weakness getting up from a chair, as well as random pains and paresthesias in all 4 limbs, and imbalance.  Those symptoms have been present for no more than 3 months, and getting worse. Exam shows normal cranial nerves II through XII.  Strength is 5/5 for bilateral deltoids, biceps, triceps, wrist extensors, , finger extensors, FDI.  Hip flexors are 4/5 bilaterally.  Knee extensors, knee flexors, foot dorsiflexors 5/5.  Tone on confrontational testing is normal.  Reflexes are 2-3+ and symmetric in bilateral upper extremities, 3+ very brisk in both knees with suprapatellar spread, and positive crossed adductor response.Ankle reflexes are 4+ with sustained clonus bilaterally.  Toes are neutral/mute, not upgoing.  Sensory exam shows intact vibration and position sensation in toes.  She has a positive Ladysmith maneuver when getting up from a chair indicating proximal trunk weakness.  Excessive trunk lordosis is observed when standing. Also notably, the relaxation of her fist is very slow/delayed, although she does not have percussion myotonia. Query myopathy/polyneuropathy/myotonia.    Techniques:    Motor and sensory conduction studies were done with surface recording electrodes. EMG was done with a concentric needle electrode.     Results:    Right fibular (EDB), tibial (AH), median (APB), and ulnar (ADM) motor nerve conduction studies were all normal. Right median to ulnar second lumbrical/palmar interossei comparison motor nerve conduction  studies were normal.  Right median and tibial F waves were normal.  Right median, ulnar, radial, and sural antidromic sensory nerve conduction studies were normal.  Needle EMG of the following muscles was normal: Right TA, medial gastrocnemius, vastus lateralis, gluteus medius, mid-and lower thoracic paraspinals, deltoid, biceps, triceps, and pronator teres.    Interpretation:    Normal study.  No electrodiagnostic evidence of myopathy, polyneuropathy, or myotonic disorder.    ___________________________  Alphonso Seharer MD        Nerve Conduction Studies  Motor Sites      Latency Amplitude Neg. Amp Diff Segment Distance Velocity Neg. Dur Neg Area Diff Temperature Comment   Site (ms) Norm (mV) Norm %  cm m/s Norm ms %  C    Right Fibular (EDB) Motor   Ankle 6.1  < 6.0 4.6  > 2.5  Ankle-EDB 8   7.5  32.2    Bel Fib Head 14.2 - 3.5 - -23.9 Bel Fib Head-Ankle 34.7 43  > 38 7.9 -16.4 32.4    Pop Fossa 15.3 - 3.6 - 2.9 Pop Fossa-Bel Fib Head 6.7 61  > 38 7.9 -1.16 32.5    Right Median (APB) Motor   Wrist 3.4  < 4.4 10.7  > 5.0  Wrist-APB 8   6.0  32.6    Elbow 7.9 - 10.9  > 5.0 1.87 Elbow-Wrist 24.5 54  > 48 5.9 -2.3 32.6    Right Median/Ulnar (Lumb-Dors Int II) Motor        Median (Lumb I)   Wrist 3.4 - 2.3 -  Wrist-Lumb I 10   8.1  32.5         Ulnar (Dorsal Int (manus))   Wrist 3.1 - 4.7 -  Wrist-Dorsal Int (manus) 10   5.8  32.6    Right Tibial (AHB) Motor   Ankle 5.0  < 6.5 16.0  > 5.0  Ankle-AHB 8   7.4  32.3    Knee 13.9 - 12.1 - -24.4 Knee-Ankle 40 45  > 38 8.2 -8.8 32.2    Right Ulnar (ADM) Motor   Wrist 2.6  < 3.5 10.6  > 5.0  Wrist-ADM 8   4.6  32.3    Bel Elbow 6.0 - 9.7 - -8.5 Bel Elbow-Wrist 19.3 57  > 48 4.7 -5.6 32.3    Abv Elbow 8.2 - 9.7 - 0 Abv Elbow-Bel Elbow 12.2 55  > 48 4.7 -0.69 32.3      F-Wave Sites      Min F-Lat Max-Min F-Lat Mean F-Lat   Site (ms) ms ms   Right Median F-Wave   Wrist 26.4 2.6 27.8   Right Tibial F-Wave   Ankle 52.2 1.40 -     Sensory Sites      Onset Lat Peak Lat Amp  (O-P) Amp (P-P) Segment Distance Velocity Temperature Comment   Site ms ms  V Norm  V  cm m/s Norm  C    Right Median Sensory   Wrist-Dig II 2.7 3.7 79  > 10 119 Wrist-Dig II 14 52  > 48 32.1    Right Radial Sensory   Forearm-Wrist 1.70 2.3 50  > 15 63 Forearm-Wrist 10 59 - 26.8    Right Sural Sensory   Calf-Lat Mall 3.5 4.3 25  > 5 60 Calf-Lat Mall 14 40  > 38 31.7    Right Ulnar Sensory   Wrist-Dig V 2.0 3.2 29  > 8 37 Wrist-Dig V 12.5 63  > 48 32.2        Electromyography     Side Muscle Ins Act Fibs/PSW Fasc HF Amp Dur Poly Recrt Int Pat   Right Tib Anterior Nml None Nml 0 Nml Nml 0 Nml Nml   Right Gastroc MH Nml None Nml 0 Nml Nml 0 Nml Nml   Right Vastus Lat Nml None Nml 0 Nml Nml 0 Nml Nml   Right Gluteus Med Nml None Nml 0 Nml Nml 0 Nml Nml   Right Thoracic Parasp (Mid) Nml None Nml 0        Right Triceps Nml None Nml 0 Nml Nml 0 Nml Nml   Right Deltoid Nml None Nml 0 Nml Nml 0 Nml Nml   Right Biceps Nml None Nml 0 Nml Nml 0 Nml Nml   Right Pronator Teres Nml None Nml 0 Nml Nml 0 Nml Nml         NCS Waveforms:    Motor                  F-Wave         Sensory                  Ultrasound Images:

## 2023-04-07 NOTE — LETTER
2023       RE: Rose Ronquillo  [address] [address]     Dear Colleague,    Thank you for referring your patient, Rose Ronquillo, to the [facility] EMG CLIN[facility] [place] [facility]. Please see a copy of my visit note below.                              [facility]  Electrodiagnostic Laboratory                 Department of Neurology                                                                                                         Test Date:  2023    Patient: Rose Ronquillo : 1990 Physician: Alphonso Shearer MD   Sex: Female AGE: 32 year Ref Phys:    ID#: [id number]   Technician: Damian Lenz     Clinical Information:    32-year-old woman with chief complaint of weakness getting up from a chair, as well as random pains and paresthesias in all 4 limbs, and imbalance.  Those symptoms have been present for no more than 3 months, and getting worse. Exam shows normal cranial nerves II through XII.  Strength is 5/5 for bilateral deltoids, biceps, triceps, wrist extensors, , finger extensors, FDI.  Hip flexors are 4/5 bilaterally.  Knee extensors, knee flexors, foot dorsiflexors 5/5.  Tone on confrontational testing is normal.  Reflexes are 2-3+ and symmetric in bilateral upper extremities, 3+ very brisk in both knees with suprapatellar spread, and positive crossed adductor response.Ankle reflexes are 4+ with sustained clonus bilaterally.  Toes are neutral/mute, not upgoing.  Sensory exam shows intact vibration and position sensation in toes.  She has a positive Cambria maneuver when getting up from a chair indicating proximal trunk weakness.  Excessive trunk lordosis is observed when standing. Also notably, the relaxation of her fist is very slow/delayed, although she does not have percussion myotonia. Query myopathy/polyneuropathy/myotonia.    Techniques:    Motor and sensory conduction studies were done  with surface recording electrodes. EMG was done with a concentric needle electrode.     Results:    Right fibular (EDB), tibial (AH), median (APB), and ulnar (ADM) motor nerve conduction studies were all normal. Right median to ulnar second lumbrical/palmar interossei comparison motor nerve conduction studies were normal.  Right median and tibial F waves were normal.  Right median, ulnar, radial, and sural antidromic sensory nerve conduction studies were normal.  Needle EMG of the following muscles was normal: Right TA, medial gastrocnemius, vastus lateralis, gluteus medius, mid-and lower thoracic paraspinals, deltoid, biceps, triceps, and pronator teres.    Interpretation:    Normal study.  No electrodiagnostic evidence of myopathy, polyneuropathy, or myotonic disorder.    ___________________________  Alphonso Shearer MD        Nerve Conduction Studies  Motor Sites      Latency Amplitude Neg. Amp Diff Segment Distance Velocity Neg. Dur Neg Area Diff Temperature Comment   Site (ms) Norm (mV) Norm %  cm m/s Norm ms %  C    Right Fibular (EDB) Motor   Ankle 6.1  < 6.0 4.6  > 2.5  Ankle-EDB 8   7.5  32.2    Bel Fib Head 14.2 - 3.5 - -23.9 Bel Fib Head-Ankle 34.7 43  > 38 7.9 -16.4 32.4    Pop Fossa 15.3 - 3.6 - 2.9 Pop Fossa-Bel Fib Head 6.7 61  > 38 7.9 -1.16 32.5    Right Median (APB) Motor   Wrist 3.4  < 4.4 10.7  > 5.0  Wrist-APB 8   6.0  32.6    Elbow 7.9 - 10.9  > 5.0 1.87 Elbow-Wrist 24.5 54  > 48 5.9 -2.3 32.6    Right Median/Ulnar (Lumb-Dors Int II) Motor        Median (Lumb I)   Wrist 3.4 - 2.3 -  Wrist-Lumb I 10   8.1  32.5         Ulnar (Dorsal Int (manus))   Wrist 3.1 - 4.7 -  Wrist-Dorsal Int (manus) 10   5.8  32.6    Right Tibial (AHB) Motor   Ankle 5.0  < 6.5 16.0  > 5.0  Ankle-AHB 8   7.4  32.3    Knee 13.9 - 12.1 - -24.4 Knee-Ankle 40 45  > 38 8.2 -8.8 32.2    Right Ulnar (ADM) Motor   Wrist 2.6  < 3.5 10.6  > 5.0  Wrist-ADM 8   4.6  32.3    Bel Elbow 6.0 - 9.7 - -8.5 Bel Elbow-Wrist 19.3 57  >  48 4.7 -5.6 32.3    Abv Elbow 8.2 - 9.7 - 0 Abv Elbow-Bel Elbow 12.2 55  > 48 4.7 -0.69 32.3      F-Wave Sites      Min F-Lat Max-Min F-Lat Mean F-Lat   Site (ms) ms ms   Right Median F-Wave   Wrist 26.4 2.6 27.8   Right Tibial F-Wave   Ankle 52.2 1.40 -     Sensory Sites      Onset Lat Peak Lat Amp (O-P) Amp (P-P) Segment Distance Velocity Temperature Comment   Site ms ms  V Norm  V  cm m/s Norm  C    Right Median Sensory   Wrist-Dig II 2.7 3.7 79  > 10 119 Wrist-Dig II 14 52  > 48 32.1    Right Radial Sensory   Forearm-Wrist 1.70 2.3 50  > 15 63 Forearm-Wrist 10 59 - 26.8    Right Sural Sensory   Calf-Lat Mall 3.5 4.3 25  > 5 60 Calf-Lat Mall 14 40  > 38 31.7    Right Ulnar Sensory   Wrist-Dig V 2.0 3.2 29  > 8 37 Wrist-Dig V 12.5 63  > 48 32.2        Electromyography     Side Muscle Ins Act Fibs/PSW Fasc HF Amp Dur Poly Recrt Int Pat   Right Tib Anterior Nml None Nml 0 Nml Nml 0 Nml Nml   Right Gastroc MH Nml None Nml 0 Nml Nml 0 Nml Nml   Right Vastus Lat Nml None Nml 0 Nml Nml 0 Nml Nml   Right Gluteus Med Nml None Nml 0 Nml Nml 0 Nml Nml   Right Thoracic Parasp (Mid) Nml None Nml 0        Right Triceps Nml None Nml 0 Nml Nml 0 Nml Nml   Right Deltoid Nml None Nml 0 Nml Nml 0 Nml Nml   Right Biceps Nml None Nml 0 Nml Nml 0 Nml Nml   Right Pronator Teres Nml None Nml 0 Nml Nml 0 Nml Nml         NCS Waveforms:    Motor                  F-Wave         Sensory                  Ultrasound Images:            Again, thank you for allowing me to participate in the care of your patient.      Sincerely,    Alphonso Shearer MD

## 2023-04-08 LAB
ACE SERPL-CCNC: 20 U/L
B BURGDOR IGG+IGM SER QL: 0.09
T PALLIDUM AB SER QL: NONREACTIVE

## 2023-04-09 ENCOUNTER — HEALTH MAINTENANCE LETTER (OUTPATIENT)
Age: 33
End: 2023-04-09

## 2023-04-09 LAB — COPPER SERPL-MCNC: 112.4 UG/DL

## 2023-04-10 ENCOUNTER — DOCUMENTATION ONLY (OUTPATIENT)
Dept: NEUROLOGY | Facility: CLINIC | Age: 33
End: 2023-04-10
Payer: COMMERCIAL

## 2023-04-10 LAB
ANA SER QL IF: NEGATIVE
ENA SS-A AB SER IA-ACNC: <0.5 U/ML
ENA SS-A AB SER IA-ACNC: NEGATIVE
ENA SS-B IGG SER IA-ACNC: <0.6 U/ML
ENA SS-B IGG SER IA-ACNC: NEGATIVE

## 2023-04-11 ENCOUNTER — OFFICE VISIT (OUTPATIENT)
Dept: NEUROLOGY | Facility: CLINIC | Age: 33
End: 2023-04-11
Attending: ORTHOPAEDIC SURGERY
Payer: COMMERCIAL

## 2023-04-11 VITALS
BODY MASS INDEX: 19.05 KG/M2 | WEIGHT: 129.8 LBS | SYSTOLIC BLOOD PRESSURE: 96 MMHG | DIASTOLIC BLOOD PRESSURE: 60 MMHG | HEART RATE: 86 BPM

## 2023-04-11 DIAGNOSIS — G89.29 CHRONIC MIDLINE THORACIC BACK PAIN: ICD-10-CM

## 2023-04-11 DIAGNOSIS — M42.00 SCHEUERMANN'S KYPHOSIS: ICD-10-CM

## 2023-04-11 DIAGNOSIS — M41.55 OTHER SECONDARY SCOLIOSIS, THORACOLUMBAR REGION: ICD-10-CM

## 2023-04-11 DIAGNOSIS — M54.6 CHRONIC MIDLINE THORACIC BACK PAIN: ICD-10-CM

## 2023-04-11 DIAGNOSIS — G95.9 MYELOPATHY (H): Primary | ICD-10-CM

## 2023-04-11 LAB — METHYLMALONATE SERPL-SCNC: 0.18 UMOL/L (ref 0–0.4)

## 2023-04-11 PROCEDURE — 99205 OFFICE O/P NEW HI 60 MIN: CPT | Performed by: PSYCHIATRY & NEUROLOGY

## 2023-04-11 RX ORDER — CHLORAL HYDRATE 500 MG
CAPSULE ORAL
COMMUNITY
Start: 2022-01-27

## 2023-04-11 RX ORDER — SERTRALINE HYDROCHLORIDE 25 MG/1
1 TABLET, FILM COATED ORAL
COMMUNITY
Start: 2023-03-29

## 2023-04-11 RX ORDER — GABAPENTIN 300 MG/1
300 CAPSULE ORAL
COMMUNITY
Start: 2021-07-15

## 2023-04-11 NOTE — PATIENT INSTRUCTIONS
Plan:  -- Contrast MRIs tomorrow as planned.  -- We will see about getting you in for your lumbar puncture sooner than May.  We will contact you if about this in the next day or 2.  -- Activity as tolerated for now.  I do think pulling back on work makes sense right now.  -- Return to neurology clinic after the tests are completed.

## 2023-04-11 NOTE — PROGRESS NOTES
"INITIAL NEUROLOGY CONSULTATION    DATE OF VISIT: 4/11/2023  MRN: [id number]  PATIENT NAME: Rose Ronquillo  YOB: 1990    REFERRING PROVIDER: Wong Culp    Chief Complaint   Patient presents with     Neurologic Problem     Myelopathy  Upper and Lower back pain          SUBJECTIVE:                                                      HPI:   Rose Ronquillo is a 32 year old female whom I have been asked by Dr. Culp to see in consultation for possible myelopathy.  She was seen in the EMG lab at the [place] last week.  Her main complaint has been weakness getting out of a chair and random paresthesias, described as pain and tingling.  She has had symptoms for about 3 months.  No similar issues prior.  Dr. Shearer noted Ballantine maneuver on exam, as well as postural instability.  Lower extremity reflexes also brisk.  She has additional history of Shermans kyphosis for which she has been seen by orthopedics.  They ordered brain and spinal imaging however these were done without contrast.  EMG did not show evidence of myopathy or neuropathy.  CK was normal at 104.  Additional labs have thus far been unremarkable: Negative Lyme and treponema antibodies, ACE normal, SSA and SSB negative, normal AMANDO, normal copper and normal folate.  She does have a paraneoplastic panel pending.  Prior labs from late last year including negative HIV and hep C.  B12 was 390.  Updated contrast imaging and lumbar puncture have been ordered.    Rose is here with her mother.   Rose is on gabapentin: (for anxiety). She has decreased the dose because she is happy. She takes it as needed. Most recently she takes 400mg, and this does help with hypereasthesia she has in the hands. Using her hands, she gets her hands get stuck, sometimes numb. Right hand dominant.     She says she loses feeling in the legs \"on/off.\" This is variable. The legs feel weak for variable lengths of time. Sitting on a ball, right to the Left of her " "tailbone helps with getting the leg to feel back to normal,     A few weeks ago she did a 3 mile walk and after riding back to the hotel, the Left leg \"did not want to work.\" She had to drag the leg for about a day.     She is an , in school for additional certification. She works two days per week. She says she is exhausted all the time.    Mother says that she does really well after her chiropractic adjustments. For 1-2 days.  She goes twice weekly.  She also does dry needle therapy, which can cause symptoms to dissipate rapidly.  She says the improved sensation and strength can occur in a region other than the location being treated.   She tends to list backward with her eyes closed in the shower.   She was previously very active.     She is seeing Vascular next week for possible Raynauds.    Recent diagnosis of PCOS.   She has digestive issues which are ongoing, several years. Previous syncopal episodes, felt to be vasovagal after work-up.   No problems with bladder control.     She is having her MRIs done tomorrow.   She is having her LP on May 2nd.     Her father had some ocular symptoms, granulomas, Sarcoid. Sister has Raynauds. Mother has Hashimoto's.     She has tried physical therapy, but the only thing that helps not are vaginal release exercises. Walking feels fine.  Mother says that sometimes her gait looks normal, sometimes it is clearly more effortful.      Past Medical History:   Diagnosis Date     Unspecified otitis media      Past Surgical History:   Procedure Laterality Date     HC CREATE EARDRUM OPENING,GEN ANESTH      P.E. Tubes x2     SURGICAL HISTORY OF -       Removal of PE Tubes       Calcium Carbonate Antacid (TUMS PO), Take  by mouth.  Cholecalciferol (VITAMIN D PO),   fish oil-omega-3 fatty acids (OMEGA-3 FISH OIL) 1000 MG capsule,   gabapentin (NEURONTIN) 100 MG capsule, Take 100 mg by mouth Takes 400mg in the morning  gabapentin (NEURONTIN) 300 MG capsule, Take 300 mg by " mouth  ibuprofen (ADVIL,MOTRIN) 200 MG tablet, Take 200 mg by mouth every 4 hours as needed  Ipratropium-Albuterol (COMBIVENT RESPIMAT)  MCG/ACT inhaler, Inhale 1 puff into the lungs 4 times daily Not to exceed 6 doses per day.  sertraline (ZOLOFT) 25 MG tablet, Take 1 tablet by mouth daily at 2 pm  VYVANSE 50 MG OR CAPS, 1 CAPSULE DAILY  ZOLOFT 50 MG OR TABS, I tablet daily  cefPROZIL (CEFZIL) 500 MG tablet, Take 1 tablet (500 mg) by mouth 2 times daily (Patient not taking: Reported on 10/27/2022)  Multiple Vitamin (MULTI-VITAMIN PO),   order for DME, Equipment being ordered: ankle brace (Patient not taking: Reported on 9/28/2017)  Wheat Dextrin (BENEFIBER PO),     No current facility-administered medications on file prior to visit.    Allergies   Allergen Reactions     Compazine Other (See Comments)     Erythromycin      EES-INTOL     Penicillins Other (See Comments)     PCN Stomach intol        Problem (# of Occurrences) Relation (Name,Age of Onset)    Asthma (1) Paternal Aunt    Hypertension (1) Mother: blood pressure issues-not sure if its high or low    Neurologic Disorder (1) Father: Myothesnia Gravis        Social History     Tobacco Use     Smoking status: Never     Smokeless tobacco: Never   Substance Use Topics     Alcohol use: Yes     Comment: occasional     Drug use: No       REVIEW OF SYSTEMS:                                                      10-point review of systems is negative except as mentioned above in HPI.     EXAM:                                                      Physical Exam:   Vitals: BP 96/60   Pulse 86   Wt 58.9 kg (129 lb 12.8 oz)   BMI 19.05 kg/m    BMI= Body mass index is 19.05 kg/m .  GENERAL: NAD.  HEENT: NC/AT.   CV: RRR. S1, S2.   NECK: No bruits.  PULM: Non-labored breathing.   Neurologic:  MENTAL STATUS: Alert, attentive. Speech is fluent. Normal comprehension. Normal concentration. Adequate fund of knowledge.   CRANIAL NERVES: Discs flat. Visual fields intact to  confrontation. Pupils equally, round and reactive to light. Facial sensation and movement normal. EOM full, though she blinks quite a lot during testing, clearly straining. Hearing intact to conversation. Trapezius strength intact. Palate moves symmetrically. Tongue midline.  MOTOR: Strength is full in the deltoids, biceps, triceps and hands.  Delayed release on .  She has to use her arms to stand from the chair.  Exaggerated effort with lower extremity testing, 4/5 at the hips, 4+/5 with knee flexion and extension bilaterally.  Strength testing around the ankle appears normal. Tone and bulk normal.   DTRs: Intact and symmetric in biceps, BR, patellae.  2+/2 in the upper extremities, brisk at the knees with some crossed adductor response.  4+ at the ankles, clonus bilaterally.  Equivocal Babinski bilaterally.  SENSATION: Normal light touch and pinprick except for some hyperesthesia in the hands. Intact proprioception at great toes. Vibration: Normal at both ankles.   COORDINATION: Normal finger nose finger. Finger tapping normal. Knee heel shin normal.  STATION AND GAIT: Romberg positive, she lists backwards. Good postural reflexes. Casual gait is cautious but otherwise appears normal.  Right hand-dominant.    Relevant Data:  MRI Brain and Cervical Spine (wo contrast) 3.30.23:  FINDINGS:  There is no mass effect, midline shift, or intracranial hemorrhage.  The ventricles are proportionate to the cerebral sulci. Diffusion and  susceptibility weighted images are negative for acute/focal  abnormality. Major intracranial vascular structures are within normal  limits.     No suspicious abnormality of the skull marrow signal. Clear paranasal  sinuses. Mastoid air cells are clear. No focal abnormality of the  pituitary gland, sella, skull base and upper cervical spinal  structures on sagittal images. The orbits are normal.                                                      IMPRESSION:  No acute intracranial  pathology. Essentially unremarkable  brain MRI.    FINDINGS:  Straightening of the expected cervical lordosis. No  spondylolisthesis.. Normal marrow signal. No loss of intervertebral  disc height. No cord signal abnormality. Diffusion-weighted images are  negative for focal abnormality.     The findings on a level by level basis are as follows:     C2-3: No spinal canal or neural foraminal stenosis.     C3-4: No spinal canal or neural foraminal stenosis.     C4-5: Posterior disc bulge. No significant spinal canal or neural  foraminal narrowing..     C5-6: Posterior disc bulge. No significant spinal canal and neural  foraminal narrowing.     C6-7: No spinal canal or neural foraminal stenosis.     C7-T1: No spinal canal or neural foraminal stenosis.     The visualized skull base, posterior fossa, and paraspinal soft  tissues are within normal limits.                                                                      IMPRESSION:  Minimal cervical degenerative changes. No high-grade spinal canal or  neural foraminal narrowing.    MRI Thoracic/Lumbar Spine (11.16.22):  Findings:  The thoracic vertebral column is in normal alignment. Exaggerated  thoracic kyphosis.      There is mild multilevel disc height loss. Tiny left subarticular  protrusion at T9-10. No high-grade spinal canal or neural foraminal  stenosis.     The spinal cord contour and signal pattern are within normal limits.       Normal paraspinous soft tissues.                                                                   Impression: Slight exaggeration of thoracic kyphosis.    IMPRESSION:  1. Stable mild convex rightward lumbar curvature in the setting of  thoracolumbar scoliosis.  2. No acute osseous or paraspinal soft tissue abnormality.  3. No significant neural foraminal or spinal canal narrowing/stenosis.    Imaging reviewed independently by me.  Agree with radiology read.      ASSESSMENT and PLAN:                                                       Assessment:     ICD-10-CM    1. Scheuermann's kyphosis  M42.00 Adult Neurology  Referral      2. Chronic midline thoracic back pain  M54.6 Adult Neurology  Referral    G89.29       3. Other secondary scoliosis, thoracolumbar region  M41.55 Adult Neurology  Referral      4. Myelopathy (H)  G95.9 Adult Neurology  Referral          Ms. Ronquillo is a pleasant 32-year-old woman with 3-month history of weakness and paresthesias.  Partial work-up has already been completed through the neuromuscular clinic.  The EMG was unrevealing, as are the labs for inflammation thus far, negative Lyme, normal copper, ACE normal, SSA and SSB negative, normal AMANDO, folate.  Paraneoplastic test is pending as are contrast images and lumbar puncture. Exam is abnormal, concerning for CNS disorder such as myelopathy in the setting of normal peripheral nerve testing.  We will continue to pursue current work-up as laid out by Dr. Shearer.  We will follow-up thereafter.    Plan:  -- Contrast MRIs tomorrow as planned.  -- We will see about getting you in for your lumbar puncture sooner than May.  We will contact you if about this in the next day or 2.  -- Activity as tolerated for now.  I do think pulling back on work makes sense right now.  -- Return to neurology clinic after the tests are completed.    Total Time: 60 minutes were spent with the patient and in chart review/documentation (as described above in Assessment and Plan) /coordinating the care on date of service.    Yolande Colvin MD  Neurology    CC: Wong Culp MD and Alphonso Shearer MD    Dragon software used in the dictation of this note.

## 2023-04-11 NOTE — NURSING NOTE
"Rose Ronquillo is a 32 year old female who presents for:  Chief Complaint   Patient presents with     Neurologic Problem     Myelopathy  Upper and Lower back pain           Initial Vitals:  BP 96/60   Pulse 86   Wt 58.9 kg (129 lb 12.8 oz)   BMI 19.05 kg/m   Estimated body mass index is 19.05 kg/m  as calculated from the following:    Height as of 3/29/23: 1.758 m (5' 9.21\").    Weight as of this encounter: 58.9 kg (129 lb 12.8 oz).. Body surface area is 1.7 meters squared. BP completed using cuff size: wrist cuff    Jeronimo PAYTON Field  "

## 2023-04-11 NOTE — LETTER
4/11/2023         RE: Rose Ronquillo  [address] [id number]        Dear Colleague,    Thank you for referring your patient, Rose Ronquillo, to the [facility] [place]. Please see a copy of my visit note below.    INITIAL NEUROLOGY CONSULTATION    DATE OF VISIT: 4/11/2023  MRN: [id number]  PATIENT NAME: Rose Ronquillo  YOB: 1990    REFERRING PROVIDER: Wong Culp    Chief Complaint   Patient presents with     Neurologic Problem     Myelopathy  Upper and Lower back pain          SUBJECTIVE:                                                      HPI:   Rose Ronquillo is a 32 year old female whom I have been asked by Dr. Culp to see in consultation for possible myelopathy.  She was seen in the EMG lab at the [place] last week.  Her main complaint has been weakness getting out of a chair and random paresthesias, described as pain and tingling.  She has had symptoms for about 3 months.  No similar issues prior.  Dr. Shearer noted Karen maneuver on exam, as well as postural instability.  Lower extremity reflexes also brisk.  She has additional history of Shermans kyphosis for which she has been seen by orthopedics.  They ordered brain and spinal imaging however these were done without contrast.  EMG did not show evidence of myopathy or neuropathy.  CK was normal at 104.  Additional labs have thus far been unremarkable: Negative Lyme and treponema antibodies, ACE normal, SSA and SSB negative, normal AMANDO, normal copper and normal folate.  She does have a paraneoplastic panel pending.  Prior labs from late last year including negative HIV and hep C.  B12 was 390.  Updated contrast imaging and lumbar puncture have been ordered.    Rose is here with her mother.   Rose is on gabapentin: (for anxiety). She has decreased the dose because she is happy. She takes it as needed. Most recently she takes 400mg, and this does help with hypereasthesia she has  "in the hands. Using her hands, she gets her hands get stuck, sometimes numb. Right hand dominant.     She says she loses feeling in the legs \"on/off.\" This is variable. The legs feel weak for variable lengths of time. Sitting on a ball, right to the Left of her tailbone helps with getting the leg to feel back to normal,     A few weeks ago she did a 3 mile walk and after riding back to the hotel, the Left leg \"did not want to work.\" She had to drag the leg for about a day.     She is an , in school for additional certification. She works two days per week. She says she is exhausted all the time.    Mother says that she does really well after her chiropractic adjustments. For 1-2 days.  She goes twice weekly.  She also does dry needle therapy, which can cause symptoms to dissipate rapidly.  She says the improved sensation and strength can occur in a region other than the location being treated.   She tends to list backward with her eyes closed in the shower.   She was previously very active.     She is seeing Vascular next week for possible Raynauds.    Recent diagnosis of PCOS.   She has digestive issues which are ongoing, several years. Previous syncopal episodes, felt to be vasovagal after work-up.   No problems with bladder control.     She is having her MRIs done tomorrow.   She is having her LP on May 2nd.     Her father had some ocular symptoms, granulomas, Sarcoid. Sister has Raynauds. Mother has Hashimoto's.     She has tried physical therapy, but the only thing that helps not are vaginal release exercises. Walking feels fine.  Mother says that sometimes her gait looks normal, sometimes it is clearly more effortful.      Past Medical History:   Diagnosis Date     Unspecified otitis media      Past Surgical History:   Procedure Laterality Date     HC CREATE EARDRUM OPENING,GEN ANESTH      P.E. Tubes x2     SURGICAL HISTORY OF -       Removal of PE Tubes       Calcium Carbonate Antacid (TUMS PO), " Take  by mouth.  Cholecalciferol (VITAMIN D PO),   fish oil-omega-3 fatty acids (OMEGA-3 FISH OIL) 1000 MG capsule,   gabapentin (NEURONTIN) 100 MG capsule, Take 100 mg by mouth Takes 400mg in the morning  gabapentin (NEURONTIN) 300 MG capsule, Take 300 mg by mouth  ibuprofen (ADVIL,MOTRIN) 200 MG tablet, Take 200 mg by mouth every 4 hours as needed  Ipratropium-Albuterol (COMBIVENT RESPIMAT)  MCG/ACT inhaler, Inhale 1 puff into the lungs 4 times daily Not to exceed 6 doses per day.  sertraline (ZOLOFT) 25 MG tablet, Take 1 tablet by mouth daily at 2 pm  VYVANSE 50 MG OR CAPS, 1 CAPSULE DAILY  ZOLOFT 50 MG OR TABS, I tablet daily  cefPROZIL (CEFZIL) 500 MG tablet, Take 1 tablet (500 mg) by mouth 2 times daily (Patient not taking: Reported on 10/27/2022)  Multiple Vitamin (MULTI-VITAMIN PO),   order for DME, Equipment being ordered: ankle brace (Patient not taking: Reported on 9/28/2017)  Wheat Dextrin (BENEFIBER PO),     No current facility-administered medications on file prior to visit.    Allergies   Allergen Reactions     Compazine Other (See Comments)     Erythromycin      EES-INTOL     Penicillins Other (See Comments)     PCN Stomach intol        Problem (# of Occurrences) Relation (Name,Age of Onset)    Asthma (1) Paternal Aunt    Hypertension (1) Mother: blood pressure issues-not sure if its high or low    Neurologic Disorder (1) Father: Myothesnia Gravis        Social History     Tobacco Use     Smoking status: Never     Smokeless tobacco: Never   Substance Use Topics     Alcohol use: Yes     Comment: occasional     Drug use: No       REVIEW OF SYSTEMS:                                                      10-point review of systems is negative except as mentioned above in HPI.     EXAM:                                                      Physical Exam:   Vitals: BP 96/60   Pulse 86   Wt 58.9 kg (129 lb 12.8 oz)   BMI 19.05 kg/m    BMI= Body mass index is 19.05 kg/m .  GENERAL: NAD.  HEENT: NC/AT.    CV: RRR. S1, S2.   NECK: No bruits.  PULM: Non-labored breathing.   Neurologic:  MENTAL STATUS: Alert, attentive. Speech is fluent. Normal comprehension. Normal concentration. Adequate fund of knowledge.   CRANIAL NERVES: Discs flat. Visual fields intact to confrontation. Pupils equally, round and reactive to light. Facial sensation and movement normal. EOM full, though she blinks quite a lot during testing, clearly straining. Hearing intact to conversation. Trapezius strength intact. Palate moves symmetrically. Tongue midline.  MOTOR: Strength is full in the deltoids, biceps, triceps and hands.  Delayed release on .  She has to use her arms to stand from the chair.  Exaggerated effort with lower extremity testing, 4/5 at the hips, 4+/5 with knee flexion and extension bilaterally.  Strength testing around the ankle appears normal. Tone and bulk normal.   DTRs: Intact and symmetric in biceps, BR, patellae.  2+/2 in the upper extremities, brisk at the knees with some crossed adductor response.  4+ at the ankles, clonus bilaterally.  Equivocal Babinski bilaterally.  SENSATION: Normal light touch and pinprick except for some hyperesthesia in the hands. Intact proprioception at great toes. Vibration: Normal at both ankles.   COORDINATION: Normal finger nose finger. Finger tapping normal. Knee heel shin normal.  STATION AND GAIT: Romberg positive, she lists backwards. Good postural reflexes. Casual gait is cautious but otherwise appears normal.  Right hand-dominant.    Relevant Data:  MRI Brain and Cervical Spine (wo contrast) 3.30.23:  FINDINGS:  There is no mass effect, midline shift, or intracranial hemorrhage.  The ventricles are proportionate to the cerebral sulci. Diffusion and  susceptibility weighted images are negative for acute/focal  abnormality. Major intracranial vascular structures are within normal  limits.     No suspicious abnormality of the skull marrow signal. Clear paranasal  sinuses. Mastoid air  cells are clear. No focal abnormality of the  pituitary gland, sella, skull base and upper cervical spinal  structures on sagittal images. The orbits are normal.                                                      IMPRESSION:  No acute intracranial pathology. Essentially unremarkable  brain MRI.    FINDINGS:  Straightening of the expected cervical lordosis. No  spondylolisthesis.. Normal marrow signal. No loss of intervertebral  disc height. No cord signal abnormality. Diffusion-weighted images are  negative for focal abnormality.     The findings on a level by level basis are as follows:     C2-3: No spinal canal or neural foraminal stenosis.     C3-4: No spinal canal or neural foraminal stenosis.     C4-5: Posterior disc bulge. No significant spinal canal or neural  foraminal narrowing..     C5-6: Posterior disc bulge. No significant spinal canal and neural  foraminal narrowing.     C6-7: No spinal canal or neural foraminal stenosis.     C7-T1: No spinal canal or neural foraminal stenosis.     The visualized skull base, posterior fossa, and paraspinal soft  tissues are within normal limits.                                                                      IMPRESSION:  Minimal cervical degenerative changes. No high-grade spinal canal or  neural foraminal narrowing.    MRI Thoracic/Lumbar Spine (11.16.22):  Findings:  The thoracic vertebral column is in normal alignment. Exaggerated  thoracic kyphosis.      There is mild multilevel disc height loss. Tiny left subarticular  protrusion at T9-10. No high-grade spinal canal or neural foraminal  stenosis.     The spinal cord contour and signal pattern are within normal limits.       Normal paraspinous soft tissues.                                                                   Impression: Slight exaggeration of thoracic kyphosis.    IMPRESSION:  1. Stable mild convex rightward lumbar curvature in the setting of  thoracolumbar scoliosis.  2. No acute osseous or  paraspinal soft tissue abnormality.  3. No significant neural foraminal or spinal canal narrowing/stenosis.    Imaging reviewed independently by me.  Agree with radiology read.      ASSESSMENT and PLAN:                                                      Assessment:     ICD-10-CM    1. Scheuermann's kyphosis  M42.00 Adult Neurology  Referral      2. Chronic midline thoracic back pain  M54.6 Adult Neurology  Referral    G89.29       3. Other secondary scoliosis, thoracolumbar region  M41.55 Adult Neurology  Referral      4. Myelopathy (H)  G95.9 Adult Neurology  Referral          Ms. Ronquillo is a pleasant 32-year-old woman with 3-month history of weakness and paresthesias.  Partial work-up has already been completed through the neuromuscular clinic.  The EMG was unrevealing, as are the labs for inflammation thus far, negative Lyme, normal copper, ACE normal, SSA and SSB negative, normal AMANDO, folate.  Paraneoplastic test is pending as are contrast images and lumbar puncture. Exam is abnormal, concerning for CNS disorder such as myelopathy in the setting of normal peripheral nerve testing.  We will continue to pursue current work-up as laid out by Dr. Shearer.  We will follow-up thereafter.    Plan:  -- Contrast MRIs tomorrow as planned.  -- We will see about getting you in for your lumbar puncture sooner than May.  We will contact you if about this in the next day or 2.  -- Activity as tolerated for now.  I do think pulling back on work makes sense right now.  -- Return to neurology clinic after the tests are completed.    Total Time: 60 minutes were spent with the patient and in chart review/documentation (as described above in Assessment and Plan) /coordinating the care on date of service.    Yolande Colvin MD  Neurology    CC: Wong Culp MD and Alphonso Shearer MD    Dragon software used in the dictation of this note.        Again, thank you for  allowing me to participate in the care of your patient.        Sincerely,        Yolande Colvin MD

## 2023-04-12 ENCOUNTER — HOSPITAL ENCOUNTER (OUTPATIENT)
Dept: MRI IMAGING | Facility: HOSPITAL | Age: 33
Discharge: HOME | End: 2023-04-12
Attending: PSYCHIATRY & NEUROLOGY
Payer: COMMERCIAL

## 2023-04-12 DIAGNOSIS — G95.9 MYELOPATHY (H): ICD-10-CM

## 2023-04-12 DIAGNOSIS — R27.0 ATAXIA: ICD-10-CM

## 2023-04-12 PROCEDURE — A9585 GADOBUTROL INJECTION: HCPCS | Performed by: PSYCHIATRY & NEUROLOGY

## 2023-04-12 PROCEDURE — 72142 MRI NECK SPINE W/DYE: CPT

## 2023-04-12 PROCEDURE — 255N000002 HC RX 255 OP 636: Performed by: PSYCHIATRY & NEUROLOGY

## 2023-04-12 PROCEDURE — 70552 MRI BRAIN STEM W/DYE: CPT

## 2023-04-12 RX ORDER — GADOBUTROL 604.72 MG/ML
0.1 INJECTION INTRAVENOUS ONCE
Status: COMPLETED | OUTPATIENT
Start: 2023-04-12 | End: 2023-04-12

## 2023-04-12 RX ADMIN — GADOBUTROL 6 ML: 604.72 INJECTION INTRAVENOUS at 13:34

## 2023-04-13 LAB — MAYO MISC RESULT: NORMAL

## 2023-04-14 ENCOUNTER — THERAPY VISIT (OUTPATIENT)
Dept: PHYSICAL THERAPY | Facility: CLINIC | Age: 33
End: 2023-04-14
Payer: COMMERCIAL

## 2023-04-14 DIAGNOSIS — M54.6 BILATERAL THORACIC BACK PAIN: Primary | ICD-10-CM

## 2023-04-14 PROCEDURE — 97140 MANUAL THERAPY 1/> REGIONS: CPT | Mod: GP | Performed by: PHYSICAL THERAPIST

## 2023-04-19 ENCOUNTER — OFFICE VISIT (OUTPATIENT)
Dept: OTHER | Facility: CLINIC | Age: 33
End: 2023-04-19
Attending: INTERNAL MEDICINE
Payer: COMMERCIAL

## 2023-04-19 VITALS
HEIGHT: 68 IN | DIASTOLIC BLOOD PRESSURE: 72 MMHG | HEART RATE: 81 BPM | OXYGEN SATURATION: 99 % | WEIGHT: 129.6 LBS | SYSTOLIC BLOOD PRESSURE: 109 MMHG | BODY MASS INDEX: 19.64 KG/M2

## 2023-04-19 DIAGNOSIS — M41.55 OTHER SECONDARY SCOLIOSIS, THORACOLUMBAR REGION: ICD-10-CM

## 2023-04-19 DIAGNOSIS — G89.29 CHRONIC MIDLINE THORACIC BACK PAIN: ICD-10-CM

## 2023-04-19 DIAGNOSIS — M54.6 CHRONIC MIDLINE THORACIC BACK PAIN: ICD-10-CM

## 2023-04-19 DIAGNOSIS — I73.00 RAYNAUD'S DISEASE WITHOUT GANGRENE: Primary | ICD-10-CM

## 2023-04-19 PROCEDURE — 99205 OFFICE O/P NEW HI 60 MIN: CPT | Performed by: INTERNAL MEDICINE

## 2023-04-19 PROCEDURE — G0463 HOSPITAL OUTPT CLINIC VISIT: HCPCS

## 2023-04-19 RX ORDER — NITROGLYCERIN 20 MG/G
1 OINTMENT TOPICAL EVERY 24 HOURS
Qty: 30 G | Refills: 5 | Status: SHIPPED | OUTPATIENT
Start: 2023-04-19

## 2023-04-19 RX ORDER — AMLODIPINE BESYLATE 2.5 MG/1
2.5 TABLET ORAL DAILY
Qty: 30 TABLET | Refills: 11 | Status: SHIPPED | OUTPATIENT
Start: 2023-04-19 | End: 2023-05-22

## 2023-04-19 NOTE — PROGRESS NOTES
[facility] INITIAL VASCULAR MEDICINE CONSULT    (New patient visit)    PRIMARY HEALTH CARE PROVIDER:  Melani Norman MD      REFERRING HEALTH CARE PROVIDER;  Wong Culp MD      REASON FOR CONSULT: Evaluation and management of Raynaud's disease with cold toes and fingers for many years turning into bluish, purplish, reddish and whitish      HPI: Rose Ronquillo is a 33 year old very pleasant female non-smoker, nondiabetic with history of kyphosis chronic back issues seeing spine specialist experiencing bilateral toes and fingers extremely cold and turning into bluish, purplish, reddish and whitish.  She has been using thick insulated gloves and socks and also using some heated socks as well.  No foot ulcers or leg ulcers.  She does participate in winter activities like skiing or skating etc. she denies any frostbite.  She has been taking gabapentin as needed basis for anxiety in addition to other medications.  She has multiple family members of first-degree relatives has a similar Raynaud's features.  She underwent recently rheumatological panel all of them are negative      She is new to this clinic reviewed available extensive records in the epic and updated chart        PAST MEDICAL HISTORY  Past Medical History:   Diagnosis Date     Uncomplicated asthma      Unspecified otitis media        CURRENT MEDICATIONS  Calcium Carbonate Antacid (TUMS PO), Take  by mouth.  Cholecalciferol (VITAMIN D PO),   fish oil-omega-3 fatty acids (OMEGA-3 FISH OIL) 1000 MG capsule,   gabapentin (NEURONTIN) 100 MG capsule, Take 100 mg by mouth Takes 400mg in the morning  gabapentin (NEURONTIN) 300 MG capsule, Take 300 mg by mouth  ibuprofen (ADVIL,MOTRIN) 200 MG tablet, Take 200 mg by mouth every 4 hours as needed  Ipratropium-Albuterol (COMBIVENT RESPIMAT)  MCG/ACT inhaler, Inhale 1 puff into the lungs 4 times daily Not to exceed 6 doses per day.  Multiple Vitamin (MULTI-VITAMIN PO),    sertraline (ZOLOFT) 25 MG tablet, Take 1 tablet by mouth daily at 2 pm  VYVANSE 50 MG OR CAPS, 1 CAPSULE DAILY  ZOLOFT 50 MG OR TABS, I tablet daily    No current facility-administered medications on file prior to visit.      PAST SURGICAL HISTORY:  Past Surgical History:   Procedure Laterality Date     CARDIAC SURGERY      ablation     SURGICAL HISTORY OF -       Removal of PE Tubes     ZZHC CREATE EARDRUM OPENING,GEN ANESTH      P.E. Tubes x2       ALLERGIES     Allergies   Allergen Reactions     Compazine Other (See Comments)     Erythromycin      EES-INTOL     Penicillins Other (See Comments)     PCN Stomach intol       FAMILY HISTORY  Family History   Problem Relation Age of Onset     Hypertension Mother         blood pressure issues-not sure if its high or low     Hypothyroidism Mother      Neurologic Disorder Father         Myothesnia Gravis     Sarcoidosis Father      Hypertension Maternal Grandmother      Graves' disease Maternal Grandmother      Hypertension Paternal Grandmother      Myocardial Infarction Paternal Grandmother      Prostate Cancer Paternal Grandfather      Raynaud syndrome Sister      Asthma Paternal Aunt        VASCULAR FAMILY HISTORY  1st order relative with atherosclerotic PAD: No  1st order relative with AAA: No  Family history of Familial Hyperlipidemia No  Family History of Hypercoagulable state:No    VASCULAR RISK FACTORS  1. Diabetes:No   2. Smoking: has never smoked.  3. HTN: normotensive  4.Hyperlipidemia: No      SOCIAL HISTORY  Social History     Socioeconomic History     Marital status: Single     Spouse name: Not on file     Number of children: Not on file     Years of education: Not on file     Highest education level: Not on file   Occupational History     Not on file   Tobacco Use     Smoking status: Never     Smokeless tobacco: Never   Vaping Use     Vaping status: Not on file   Substance and Sexual Activity     Alcohol use: Yes     Comment: occasional     Drug use: No      Sexual activity: Never     Comment: 1/12/15   Other Topics Concern     Parent/sibling w/ CABG, MI or angioplasty before 65F 55M? No   Social History Narrative     Not on file     Social Determinants of Health     Financial Resource Strain: Not on file   Food Insecurity: Not on file   Transportation Needs: Not on file   Physical Activity: Not on file   Stress: Not on file   Social Connections: Not on file   Intimate Partner Violence: Not on file   Housing Stability: Not on file       ROS:   General: No change in weight, sleep or appetite.  Normal energy.  No fever or chills  Eyes: Negative for vision changes or eye problems  ENT: No problems with ears, nose or throat.  No difficulty swallowing.  Resp: No coughing, wheezing or shortness of breath  CV: No chest pains or palpitations  GI: No nausea, vomiting,  heartburn, abdominal pain, diarrhea, constipation or change in bowel habits  : No urinary frequency or dysuria, bladder or kidney problems  Musculoskeletal: History of kyphosis and chronic back issues  Neurologic: No headaches, numbness, tingling, weakness, problems with balance or coordination  Psychiatric: No problems with anxiety, depression or mental health  Heme/immune/allergy: No history of bleeding or clotting problems or anemia.  No allergies or immune system problems  Endocrine: No history of thyroid disease, diabetes or other endocrine disorders  Skin: Cold hands and feet for many years worse during the wintertime and exposure to the cold  Vascular: Longstanding history of cold toes and fingers turning into bluish, purplish, reddish and whitish when exposure to the cold and she has been using thick insulated socks, blouse and also uses heated socks etc.  She participates in winter activities skiing, skating etc.  No history of her frostbite  No claudication symptoms  She has been dealing with chronic spine issues from the kyphosis and scoliosis  EXAM:  /72 (BP Location: Right arm)   Pulse 81  "  Ht 1.727 m (5' 8\")   Wt 58.8 kg (129 lb 9.6 oz)   SpO2 99%   BMI 19.71 kg/m    In general, the patient is a pleasant female in no apparent distress.    HEENT: NC/AT.  PERRLA.  EOMI.  Sclerae white, not injected.  Nares clear.  Pharynx without erythema or exudate.  Dentition intact.    Neck: No adenopathy.  No thyromegaly. Carotids +2/2 bilaterally without bruits.  No jugular venous distension.   Heart: RRR. Normal S1, S2 splits physiologically. No murmur, rub, click, or gallop. The PMI is in the 5th ICS in the midclavicular line. There is no heave.    Lungs: CTA.  No ronchi, wheezes, rales.  No dullness to percussion.   Abdomen: Soft, nontender, nondistended. No organomegaly. No AAA.  No bruits.   Extremities: Vascular;  Bilateral hands and feet are cold feet colder than hands and abnormal capillary response 4 to 5 seconds  No foot ulcers or toe ulcers  No leg edema  Good palpable peripheral pulses  No varicose veins    Labs:  LIPID RESULTS:  Lab Results   Component Value Date    CHOL 188 08/17/2012    HDL 44 (L) 08/17/2012     08/17/2012    TRIG 96 08/17/2012    CHOLHDLRATIO 4.0 08/17/2012       LIVER ENZYME RESULTS:  Lab Results   Component Value Date    AST 16 08/16/2008    ALT 23 08/16/2008       CBC RESULTS:  Lab Results   Component Value Date    WBC 5.8 09/08/2009    RBC 4.17 09/08/2009    HGB 14.2 08/17/2012    HCT 36.9 09/08/2009    MCV 89 09/08/2009    MCH 29.7 09/08/2009    MCHC 33.6 09/08/2009    RDW 12.8 09/08/2009     09/08/2009       BMP RESULTS:  Lab Results   Component Value Date     05/31/2016    POTASSIUM 4.0 05/31/2016    CHLORIDE 107 05/31/2016    CO2 29 08/16/2008    ANIONGAP 9 05/31/2016    GLC 97 05/31/2016    BUN 17 05/31/2016    CR 1.03 05/31/2016    GFRESTIMATED >60 05/31/2016    GFRESTBLACK >60 05/31/2016    AVA 9.1 05/31/2016      THYROID RESULTS:  Lab Results   Component Value Date    TSH 1.29 08/17/2012       Procedures:       Assessment and Plan:     1. " Raynaud's disease without gangrene    - nitroGLYcerin (NITRO-BID) 2 % OINT ointment; Place 1 inch (15 mg) onto the skin every 24 hours Apply to affected area ( toes or fingers)  at night and avoid touching eyes or face .  Dispense: 30 g; Refill: 5  - amLODIPine (NORVASC) 2.5 MG tablet; Take 1 tablet (2.5 mg) by mouth daily  Dispense: 30 tablet; Refill: 11    2. Chronic midline thoracic back pain    3. Other secondary scoliosis, thoracolumbar region      This is a very pleasant young 33-year-old female with longstanding history of Raynaud's phenomenon with hands and feet turning into cold and changing color to bluish, purplish, whitish and reddish worse during the wintertime and also she participates extensively into activities and no previous history of frostbite.  She also has a history of kyphosis, scoliosis with chronic back issues seeing spine specialist and takes periodically gabapentin for anxiety.  She has been using thermal protection  Her blood pressure is soft side  Recent rheumatological studies all of them are completely normal  We have discussed Raynaud's and its implications and need for thermal protection etc.  She is non-smoker not using any marijuana etc.  I have given education sheet on Raynaud's    Plan:  Continue thermal protection  Take low-dose amlodipine 2.5 mg daily in the morning (her blood pressure is soft side)  Utilize nitroglycerin 2% ointment in the evening to the toes and if needed fingers  She probably has some neurogenic mediated triggering with severe kyphoscoliosis and she will benefit with taking daily basis of gabapentin teen suggested taking 200 mg at bedtime and if needed use additional dose for anxiety  No need for any additional blood tests  Prescription sent to the pharmacy  Return to clinic in 6 months or sooner if any problems    - Vascular Medicine Referral      60  minutes spent on the date of the encounter doing chart review, history and exam, documentation, and further  activities as noted above.  She is new to this clinic reviewed available extensive records in the epic and updated chart.  AVS with written instructions given.    This note was dictated by utilizing Dragon software  Thank you for the consultation !  Copy of this note to primary care physician and referring physician    Return to clinic in 6 months    Tan Amezquita MD, FAHA, FSVM, FNLA, FACP  Vascular medicine  Clinical hypertension specialist  Clinical lipidologist

## 2023-04-19 NOTE — PATIENT INSTRUCTIONS
Please take gabapentin 200 mg daily in the evening     Use nitroglycerine 2% ointment to the toes and fingers at night new Rx sent     Take amlodipine 2.5 mg daily in am     Thermal protection     RTC in 6 months     Raynaud Phenomenon     What is Raynaud phenomenon?   Raynaud phenomenon (RP) is a name given to episodes of color changes (usually pale or blue) in the hands and feet in response to the cold or emotional stress. The episodes usually come on suddenly and last minutes to hours. At first it might affect only one finger or toe, but may, over time, spread to other fingers and toes. There is sometimes a clear line at which the color changes from normal to discolored. It may affect both hands equally and may cause numbness, tingling, or an aching pain.  Sometimes the arms or legs may get mottled, and it can also affect other areas of the body, such as the ears, nose, and face. Upon warming, the hands and feet usually become red or flushed and they may feel swollen or itchy.     Who gets it?   RP is fairly common, especially in regions with colder climates. It affects girls somewhat more often than boys.  It often runs in families.      Most people with RP do not have an underlying rheumatic disease and will not go on to develop one. RP can be associated with rheumatic diseases including lupus, systemic sclerosis, and mixed connective tissue disease (MCTD), however these patients will have additional signs and symptoms of those diseases.     What causes it?   Normally, blood vessels constrict in response to cold temperatures in order to keep the body warm. It is thought that in RP, there is over-activation of blood vessel constriction in response to cold (and stress and exercise). This constriction leads to limited blood supply to the skin resulting in paleness. The blue color results because the tissue in the hands and feet are extracting so much oxygen from the slow-flowing blood. The fingers and toes appear  red when re-warmed because of exaggerated blood flow through the blood vessels. These episodes of decreased and increased blood flow are mainly a nuisance, and do not (with rare exceptions) damage the fingers or toes.    How is it diagnosed?   RP is diagnosed based upon the story and exam by your doctor. There are no simple tests to help diagnose RP. Your doctor would ask questions to be sure there is no associated rheumatic disease, do a general physical examination, and carefully look at your nailfold capillaries with a magnifier in the office.  Abnormalities in the nailfold capillaries may indicate an underlying rheumatic disease. Lab testing, such as an AMANDO, is not routinely necessary unless there are other reasons to suspect a rheumatic disease.     How is it treated?  Mild RP can be treated with simple measures, such as dressing warmly (for example wearing mittens, long underwear, and feet warmers), avoiding sudden cold exposures, minimizing emotional stress, and avoiding other aggravating factors (such as cigarette smoke, and stimulants such as decongestants and diet pills).     Children can learn through biofeedback training how to increase the blood flow to their fingers and toes and increase their skin temperatures. There are several medications that can be helpful, and the most commonly used medication is a calcium-channel blocker called nifedipine or amlodipine

## 2023-04-19 NOTE — PROGRESS NOTES
[facility]        Patient is here for a consult to discuss Raynaud's    Pt is currently taking no meds that would impact our treatment plan.    There were no vitals taken for this visit.    The provider has been notified that the patient has no concerns.     Refills are needed: N/A    Has homecare services and agency name:  Kierra Gann RN

## 2023-04-20 DIAGNOSIS — Z01.812 PRE-PROCEDURE LAB EXAM: Primary | ICD-10-CM

## 2023-04-21 ENCOUNTER — ANCILLARY PROCEDURE (OUTPATIENT)
Dept: INTERVENTIONAL RADIOLOGY/VASCULAR | Facility: CLINIC | Age: 33
End: 2023-04-21
Attending: PSYCHIATRY & NEUROLOGY
Payer: COMMERCIAL

## 2023-04-21 ENCOUNTER — LAB (OUTPATIENT)
Dept: LAB | Facility: CLINIC | Age: 33
End: 2023-04-21
Payer: COMMERCIAL

## 2023-04-21 VITALS
SYSTOLIC BLOOD PRESSURE: 110 MMHG | OXYGEN SATURATION: 98 % | TEMPERATURE: 98.3 F | HEART RATE: 65 BPM | DIASTOLIC BLOOD PRESSURE: 73 MMHG

## 2023-04-21 DIAGNOSIS — Z01.812 PRE-PROCEDURE LAB EXAM: ICD-10-CM

## 2023-04-21 DIAGNOSIS — G95.9 MYELOPATHY (H): ICD-10-CM

## 2023-04-21 DIAGNOSIS — R27.0 ATAXIA: ICD-10-CM

## 2023-04-21 LAB
APPEARANCE CSF: CLEAR
COLOR CSF: COLORLESS
GLUCOSE CSF-MCNC: 55 MG/DL (ref 40–70)
HOLD SPECIMEN: NORMAL
INR PPP: 0.98 (ref 0.85–1.15)
Lab: NORMAL
PERFORMING LABORATORY: NORMAL
PLATELET # BLD AUTO: 173 10E3/UL (ref 150–450)
PROT CSF-MCNC: 24.1 MG/DL (ref 15–45)
RBC # CSF MANUAL: 1 /UL (ref 0–2)
SPECIMEN STATUS: NORMAL
TEST NAME: NORMAL
TUBE # CSF: 3
WBC # CSF MANUAL: 2 /UL (ref 0–5)

## 2023-04-21 PROCEDURE — 87205 SMEAR GRAM STAIN: CPT | Performed by: PATHOLOGY

## 2023-04-21 PROCEDURE — 82784 ASSAY IGA/IGD/IGG/IGM EACH: CPT | Mod: 90 | Performed by: PATHOLOGY

## 2023-04-21 PROCEDURE — 99000 SPECIMEN HANDLING OFFICE-LAB: CPT | Performed by: PATHOLOGY

## 2023-04-21 PROCEDURE — 82040 ASSAY OF SERUM ALBUMIN: CPT | Mod: 90 | Performed by: PATHOLOGY

## 2023-04-21 PROCEDURE — 88108 CYTOPATH CONCENTRATE TECH: CPT | Mod: 26 | Performed by: PATHOLOGY

## 2023-04-21 PROCEDURE — 36415 COLL VENOUS BLD VENIPUNCTURE: CPT | Performed by: PATHOLOGY

## 2023-04-21 PROCEDURE — 89050 BODY FLUID CELL COUNT: CPT | Performed by: PATHOLOGY

## 2023-04-21 PROCEDURE — 84182 PROTEIN WESTERN BLOT TEST: CPT | Performed by: PATHOLOGY

## 2023-04-21 PROCEDURE — 87070 CULTURE OTHR SPECIMN AEROBIC: CPT | Performed by: PATHOLOGY

## 2023-04-21 PROCEDURE — 86255 FLUORESCENT ANTIBODY SCREEN: CPT | Performed by: PATHOLOGY

## 2023-04-21 PROCEDURE — 82945 GLUCOSE OTHER FLUID: CPT | Performed by: PATHOLOGY

## 2023-04-21 PROCEDURE — 86341 ISLET CELL ANTIBODY: CPT | Performed by: PATHOLOGY

## 2023-04-21 PROCEDURE — 86053 AQAPRN-4 ANTB FLO CYTMTRY EA: CPT | Performed by: PATHOLOGY

## 2023-04-21 PROCEDURE — 85610 PROTHROMBIN TIME: CPT | Performed by: PATHOLOGY

## 2023-04-21 PROCEDURE — 85049 AUTOMATED PLATELET COUNT: CPT | Performed by: PATHOLOGY

## 2023-04-21 PROCEDURE — 82042 OTHER SOURCE ALBUMIN QUAN EA: CPT | Mod: 90 | Performed by: PATHOLOGY

## 2023-04-21 PROCEDURE — 62328 DX LMBR SPI PNXR W/FLUOR/CT: CPT | Performed by: PHYSICIAN ASSISTANT

## 2023-04-21 PROCEDURE — 83916 OLIGOCLONAL BANDS: CPT | Mod: 90 | Performed by: PATHOLOGY

## 2023-04-21 PROCEDURE — 87015 SPECIMEN INFECT AGNT CONCNTJ: CPT | Performed by: PATHOLOGY

## 2023-04-21 PROCEDURE — 84157 ASSAY OF PROTEIN OTHER: CPT | Performed by: PATHOLOGY

## 2023-04-21 RX ORDER — LIDOCAINE HYDROCHLORIDE 10 MG/ML
5 INJECTION, SOLUTION EPIDURAL; INFILTRATION; INTRACAUDAL; PERINEURAL ONCE
Status: COMPLETED | OUTPATIENT
Start: 2023-04-21 | End: 2023-04-21

## 2023-04-21 RX ADMIN — LIDOCAINE HYDROCHLORIDE 5 ML: 10 INJECTION, SOLUTION EPIDURAL; INFILTRATION; INTRACAUDAL; PERINEURAL at 13:35

## 2023-04-21 NOTE — DISCHARGE INSTRUCTIONS
Discharge instructions for Lumbar Puncture           AFTER YOU GO HOME      Relax and take it easy for 24 hours  You may resume normal activity after the 24 hour period  You may develop a headache that will normally go away on its own in 1 to 2 days. Lying down should help relieve this pain.  If you develop a headache you can take over the counter medicines and lay down.  You can try drinking caffeine-coffee, soda.   Drink at least 4 glasses of extra fluid today if not on a fluid restriction  Continue to take your medications as ordered by your provider  You may remove the bandage and resume bathing the next day  DO NOT drive or operate machinery at home or at work for at least 24 hours               CALL YOU PRIMARY PHYSICIAN IF:    Severe head or neck pain that doesn't go away or that gets worse  You feel less alert or have trouble waking up  Repeated upset stomach (nausea) or vomiting  Swelling, pain, bruising, or redness at the puncture site  Fever of 100.4 F (38 C) or higher, or as advised by your provider  If you start to leak a large amount of fluid from the puncture site (also, lie down flat on your back)      Date: 4/21/23  Provider: Christopher BARRIOS PA-C, Interventional Radiology, [facility]    [facility]  [address] [place] [address]

## 2023-04-21 NOTE — PROGRESS NOTES
[facility]  [id number]    Completed lumbar puncture under fluoroscopic guidance.  A 22 gauge 3.5 inch spinal needle was advanced between L3-L4 with return of fluid.  A total of 10 mL was collected in 4 tubes for requested labs. Needle removed after return of stylet.  No immediate complication.  Plan: lie flat for 1 hour.  Dx: Jolynn.  Nery.  <2

## 2023-04-24 LAB
ALB CSF/SERPL: 2.7 RATIO
ALBUMIN CSF-MCNC: 12 MG/DL
ALBUMIN SERPL-MCNC: 4426 MG/DL
IGG CSF-MCNC: 1.5 MG/DL
IGG SERPL-MCNC: 1071 MG/DL
IGG SYNTH RATE SER+CSF CALC-MRATE: <0 MG/D
IGG/ALB CLEAR SER+CSF-RTO: 0.52 RATIO
IGG/ALB CSF: 0.12 RATIO
OLIGOCLONAL BANDS CSF ELPH-IMP: NEGATIVE
OLIGOCLONAL BANDS CSF ELPH-IMP: NORMAL
OLIGOCLONAL BANDS CSF IEF: 0 BANDS
PATH REPORT.COMMENTS IMP SPEC: NORMAL
PATH REPORT.FINAL DX SPEC: NORMAL
PATH REPORT.GROSS SPEC: NORMAL
PATH REPORT.MICROSCOPIC SPEC OTHER STN: NORMAL
PATH REPORT.RELEVANT HX SPEC: NORMAL

## 2023-04-26 LAB
BACTERIA CSF CULT: NO GROWTH
GRAM STAIN RESULT: NORMAL
GRAM STAIN RESULT: NORMAL

## 2023-04-28 ENCOUNTER — THERAPY VISIT (OUTPATIENT)
Dept: PHYSICAL THERAPY | Facility: CLINIC | Age: 33
End: 2023-04-28
Payer: COMMERCIAL

## 2023-04-28 DIAGNOSIS — M54.6 BILATERAL THORACIC BACK PAIN: Primary | ICD-10-CM

## 2023-04-28 PROCEDURE — 97140 MANUAL THERAPY 1/> REGIONS: CPT | Mod: GP | Performed by: PHYSICAL THERAPIST

## 2023-05-08 ENCOUNTER — OFFICE VISIT (OUTPATIENT)
Dept: NEUROLOGY | Facility: CLINIC | Age: 33
End: 2023-05-08
Payer: COMMERCIAL

## 2023-05-08 VITALS
SYSTOLIC BLOOD PRESSURE: 101 MMHG | WEIGHT: 132 LBS | HEART RATE: 78 BPM | BODY MASS INDEX: 20.07 KG/M2 | DIASTOLIC BLOOD PRESSURE: 67 MMHG

## 2023-05-08 DIAGNOSIS — Z79.899 ENCOUNTER FOR LONG-TERM (CURRENT) USE OF MEDICATIONS: ICD-10-CM

## 2023-05-08 DIAGNOSIS — R20.3 HYPERESTHESIA: ICD-10-CM

## 2023-05-08 DIAGNOSIS — R53.83 OTHER FATIGUE: Primary | ICD-10-CM

## 2023-05-08 PROCEDURE — 99215 OFFICE O/P EST HI 40 MIN: CPT | Performed by: PSYCHIATRY & NEUROLOGY

## 2023-05-08 RX ORDER — MODAFINIL 100 MG/1
100 TABLET ORAL DAILY
Qty: 30 TABLET | Refills: 5 | Status: SHIPPED | OUTPATIENT
Start: 2023-05-08

## 2023-05-08 NOTE — PROGRESS NOTES
"ESTABLISHED PATIENT NEUROLOGY NOTE    DATE OF VISIT: 5/8/2023  MRN: [id number]  PATIENT NAME: Rose Ronquillo  YOB: 1990    Chief Complaint   Patient presents with     Neurologic Problem     Myelopathy follow-up   Random nerve pain     SUBJECTIVE:                                                      HISTORY OF PRESENT ILLNESS:  Rose is here for follow up regarding weakness/paresthesias.    History as previously documented by me (4.11.23):  She was seen in the EMG lab at the [facility] last week.  Her main complaint has been weakness getting out of a chair and random paresthesias, described as pain and tingling.  She has had symptoms for about 3 months.  No similar issues prior.  Dr. Shearer noted Karen maneuver on exam, as well as postural instability.  Lower extremity reflexes also brisk.  She has additional history of Shermans kyphosis for which she has been seen by orthopedics.  They ordered brain and spinal imaging however these were done without contrast.  EMG did not show evidence of myopathy or neuropathy.  CK was normal at 104.  Additional labs have thus far been unremarkable: Negative Lyme and treponema antibodies, ACE normal, SSA and SSB negative, normal AMANDO, normal copper and normal folate.  She does have a paraneoplastic panel pending.  Prior labs from late last year including negative HIV and hep C.  B12 was 390.  Updated contrast imaging and lumbar puncture have been ordered.     Rose is here with her mother.   Rose is on gabapentin: (for anxiety). She has decreased the dose because she is happy. She takes it as needed. Most recently she takes 400mg, and this does help with hypereasthesia she has in the hands. Using her hands, she gets her hands get stuck, sometimes numb. Right hand dominant.      She says she loses feeling in the legs \"on/off.\" This is variable. The legs feel weak for variable lengths of time. Sitting on a ball, right to the Left of her tailbone helps with " "getting the leg to feel back to normal,      A few weeks ago she did a 3 mile walk and after riding back to the hotel, the Left leg \"did not want to work.\" She had to drag the leg for about a day.      She is an , in school for additional certification. She works two days per week. She says she is exhausted all the time.     Mother says that she does really well after her chiropractic adjustments. For 1-2 days.  She goes twice weekly.  She also does dry needle therapy, which can cause symptoms to dissipate rapidly.  She says the improved sensation and strength can occur in a region other than the location being treated.   She tends to list backward with her eyes closed in the shower.   She was previously very active.     She is seeing Vascular next week for possible Raynauds.    Recent diagnosis of PCOS.   She has digestive issues which are ongoing, several years. Previous syncopal episodes, felt to be vasovagal after work-up.   No problems with bladder control.      She is having her MRIs done tomorrow.   She is having her LP on May 2nd.      Her father had some ocular symptoms, granulomas, Sarcoid. Sister has Raynauds. Mother has Hashimoto's.      She has tried physical therapy, but the only thing that helps not are vaginal release exercises. Walking feels fine.  Mother says that sometimes her gait looks normal, sometimes it is clearly more effortful.    Labs, including CSF studies, have all come back in the normal/negative range (CSF cytology, culture, count, glucose, proteins, oligoclonal bands, amphiphysin antibodies, AGNA, JEREMIE 1,2 and 3, CRMP-5-IgG, and AP3B2 IFA). 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  She did see Vascular who started amlodipine and nitroglycerin ointment for Raynaud's.    Rose is here with her mother again today. Migrating burning pain and hyperaesthesia. Shaving her legs, armpits in particular hurts.  She gets tired easily, general fatigue.  She mentions that she was still " over the weekend.  She continues to drop things, has trouble with coordination.    Mother asks about functional neurologic disorder/conversion disorder.  They have a friend whose daughter has been diagnosed with this and fibromyalgia.  She went through a similar work-up.    Mood is described as good, stable with therapy.  Rose does not feel like she has a heightened sense of anxiety.    CURRENT MEDICATIONS:   amLODIPine (NORVASC) 2.5 MG tablet, Take 1 tablet (2.5 mg) by mouth daily  Calcium Carbonate Antacid (TUMS PO), Take  by mouth.  Cholecalciferol (VITAMIN D PO),   fish oil-omega-3 fatty acids (OMEGA-3 FISH OIL) 1000 MG capsule,   gabapentin (NEURONTIN) 100 MG capsule, Take 100 mg by mouth Takes 400mg in the morning  gabapentin (NEURONTIN) 300 MG capsule, Take 300 mg by mouth  ibuprofen (ADVIL,MOTRIN) 200 MG tablet, Take 200 mg by mouth every 4 hours as needed  Ipratropium-Albuterol (COMBIVENT RESPIMAT)  MCG/ACT inhaler, Inhale 1 puff into the lungs 4 times daily Not to exceed 6 doses per day.  nitroGLYcerin (NITRO-BID) 2 % OINT ointment, Place 1 inch (15 mg) onto the skin every 24 hours Apply to affected area ( toes or fingers)  at night and avoid touching eyes or face .  sertraline (ZOLOFT) 25 MG tablet, Take 1 tablet by mouth daily at 2 pm  VYVANSE 50 MG OR CAPS, 1 CAPSULE DAILY  ZOLOFT 50 MG OR TABS, I tablet daily  Multiple Vitamin (MULTI-VITAMIN PO),     No current facility-administered medications on file prior to visit.      RECENT DIAGNOSTIC STUDIES:   Labs: No results found for any visits on 05/08/23.    REVIEW OF SYSTEMS:                                                      10-point review of systems is negative except as mentioned above in HPI.    EXAM:                                                      Physical Exam:   Vitals: /67   Pulse 78   Wt 59.9 kg (132 lb)   BMI 20.07 kg/m    BMI= Body mass index is 20.07 kg/m .  GENERAL: NAD.  HEENT: NC/AT.  CV: RRR. S1, S2.   NECK: No  bruits.  PULM: Non-labored breathing.   Neurologic:  MENTAL STATUS: Alert, attentive. Speech is fluent. Normal comprehension. Normal concentration. Adequate fund of knowledge.   CRANIAL NERVES: Discs flat. Visual fields intact to confrontation. Pupils equally, round and reactive to light. Facial sensation and movement normal. EOM full, though she blinks quite a lot during testing, clearly straining. Hearing intact to conversation. Trapezius strength intact. Palate moves symmetrically. Tongue midline.  MOTOR: Strength is full in the deltoids, biceps, triceps and hands.  Delayed release on .  She has to use her arms to stand from the chair.  Exaggerated effort with lower extremity testing, 4/5 at the hips, 4+/5 with knee flexion and extension bilaterally.  Strength testing around the ankle appears normal. Tone and bulk normal.   DTRs: Intact and symmetric in biceps, BR, patellae.  2+/2 in the upper extremities, brisk at the knees with some crossed adductor response.  4+ at the ankles, clonus bilaterally.  Equivocal Babinski bilaterally.  SENSATION: Normal light touch and pinprick except for some hyperesthesia in the hands. Intact proprioception at great toes. Vibration: Normal at both ankles.   COORDINATION: Normal finger nose finger. Finger tapping normal. Knee heel shin normal.  STATION AND GAIT: Romberg positive, she lists backwards. Good postural reflexes. Casual gait is cautious but otherwise appears normal.  Right hand-dominant.    ASSESSMENT and PLAN:                                                      Assessment:    ICD-10-CM    1. Other fatigue  R53.83 modafinil (PROVIGIL) 100 MG tablet      2. Hyperesthesia  R20.3 Acupuncture Referral      3. Encounter for long-term (current) use of medications  Z79.899            Ms. Ronquillo is a pleasant 33-year-old woman with relatively sudden onset weakness and paresthesias.  Extensive work-up has thus far been unremarkable.  No abnormal findings on CSF studies thus  far.  Serum tests have been unremarkable.  She does get some relief from her hyperesthesia with the gabapentin.  Fatigue is an issue as well, not so much muscle fatigue but just generalized fatigue so I suggested that while we continue to work on her diagnosis we do a trial of modafinil.  I am considering a course of steroids.    Plan:  -- Let's do a trial of Modafinil (provigil): 100mg every morning to start.   -- Let me know how you respond.  We can certainly add a second daily dose if we need to.  -- Check with your pharmacy about acupuncture options.  I have put in the order for this as well in case you can find a facility that your insurance will pay for.  -- Continue the gabapentin for nerve pain.  -- I will let you know if Dr. Shearer has any additional recommendations.  We will also be watching for that additional test to come back.  -- Return to neurology clinic in 3 months.    Total Time: 40 minutes were spent with the patient and in chart review/documentation (as described above in Assessment and Plan)/coordinating the care on date of service.    Yolande Colvin MD  Neurology    Dragon software used in the dictation of this note.

## 2023-05-08 NOTE — PATIENT INSTRUCTIONS
Plan:  -- Let's do a trial of Modafinil (provigil): 100mg every morning to start.   -- Let me know how you respond.  We can certainly add a second daily dose if we need to.  -- Check with your pharmacy about acupuncture options.  I have put in the order for this as well in case you can find a facility that your insurance will pay for.  -- Continue the gabapentin for nerve pain.  -- I will let you know if Dr. Shearer has any additional recommendations.  We will also be watching for that additional test to come back.  -- Return to neurology clinic in 3 months.

## 2023-05-08 NOTE — LETTER
5/8/2023         RE: Rose Ronquillo  [address] [facility] [address]        Dear Colleague,    Thank you for referring your patient, Rose Ronquillo, to the [facility] [place]. Please see a copy of my visit note below.    ESTABLISHED PATIENT NEUROLOGY NOTE    DATE OF VISIT: 5/8/2023  MRN: [id number]  PATIENT NAME: Rose Ronquillo  YOB: 1990    Chief Complaint   Patient presents with     Neurologic Problem     Myelopathy follow-up   Random nerve pain     SUBJECTIVE:                                                      HISTORY OF PRESENT ILLNESS:  Rose is here for follow up regarding weakness/paresthesias.    History as previously documented by me (4.11.23):  She was seen in the EMG lab at the [place] last week.  Her main complaint has been weakness getting out of a chair and random paresthesias, described as pain and tingling.  She has had symptoms for about 3 months.  No similar issues prior.  Dr. Shearer noted Arcola maneuver on exam, as well as postural instability.  Lower extremity reflexes also brisk.  She has additional history of Shermans kyphosis for which she has been seen by orthopedics.  They ordered brain and spinal imaging however these were done without contrast.  EMG did not show evidence of myopathy or neuropathy.  CK was normal at 104.  Additional labs have thus far been unremarkable: Negative Lyme and treponema antibodies, ACE normal, SSA and SSB negative, normal AMANDO, normal copper and normal folate.  She does have a paraneoplastic panel pending.  Prior labs from late last year including negative HIV and hep C.  B12 was 390.  Updated contrast imaging and lumbar puncture have been ordered.     Rose is here with her mother.   Rose is on gabapentin: (for anxiety). She has decreased the dose because she is happy. She takes it as needed. Most recently she takes 400mg, and this does help with hypereasthesia she has in the hands. Using her  "hands, she gets her hands get stuck, sometimes numb. Right hand dominant.      She says she loses feeling in the legs \"on/off.\" This is variable. The legs feel weak for variable lengths of time. Sitting on a ball, right to the Left of her tailbone helps with getting the leg to feel back to normal,      A few weeks ago she did a 3 mile walk and after riding back to the hotel, the Left leg \"did not want to work.\" She had to drag the leg for about a day.      She is an , in school for additional certification. She works two days per week. She says she is exhausted all the time.     Mother says that she does really well after her chiropractic adjustments. For 1-2 days.  She goes twice weekly.  She also does dry needle therapy, which can cause symptoms to dissipate rapidly.  She says the improved sensation and strength can occur in a region other than the location being treated.   She tends to list backward with her eyes closed in the shower.   She was previously very active.     She is seeing Vascular next week for possible Raynauds.    Recent diagnosis of PCOS.   She has digestive issues which are ongoing, several years. Previous syncopal episodes, felt to be vasovagal after work-up.   No problems with bladder control.      She is having her MRIs done tomorrow.   She is having her LP on May 2nd.      Her father had some ocular symptoms, granulomas, Sarcoid. Sister has Raynauds. Mother has Hashimoto's.      She has tried physical therapy, but the only thing that helps not are vaginal release exercises. Walking feels fine.  Mother says that sometimes her gait looks normal, sometimes it is clearly more effortful.    Labs, including CSF studies, have all come back in the normal/negative range (CSF cytology, culture, count, glucose, proteins, oligoclonal bands, amphiphysin antibodies, AGNA, JEREMIE 1,2 and 3, CRMP-5-IgG, and AP3B2 IFA). 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  She did see Vascular who started " amlodipine and nitroglycerin ointment for Raynaud's.    Rose is here with her mother again today. Migrating burning pain and hyperaesthesia. Shaving her legs, armpits in particular hurts.  She gets tired easily, general fatigue.  She mentions that she was still over the weekend.  She continues to drop things, has trouble with coordination.    Mother asks about functional neurologic disorder/conversion disorder.  They have a friend whose daughter has been diagnosed with this and fibromyalgia.  She went through a similar work-up.    Mood is described as good, stable with therapy.  Rose does not feel like she has a heightened sense of anxiety.    CURRENT MEDICATIONS:   amLODIPine (NORVASC) 2.5 MG tablet, Take 1 tablet (2.5 mg) by mouth daily  Calcium Carbonate Antacid (TUMS PO), Take  by mouth.  Cholecalciferol (VITAMIN D PO),   fish oil-omega-3 fatty acids (OMEGA-3 FISH OIL) 1000 MG capsule,   gabapentin (NEURONTIN) 100 MG capsule, Take 100 mg by mouth Takes 400mg in the morning  gabapentin (NEURONTIN) 300 MG capsule, Take 300 mg by mouth  ibuprofen (ADVIL,MOTRIN) 200 MG tablet, Take 200 mg by mouth every 4 hours as needed  Ipratropium-Albuterol (COMBIVENT RESPIMAT)  MCG/ACT inhaler, Inhale 1 puff into the lungs 4 times daily Not to exceed 6 doses per day.  nitroGLYcerin (NITRO-BID) 2 % OINT ointment, Place 1 inch (15 mg) onto the skin every 24 hours Apply to affected area ( toes or fingers)  at night and avoid touching eyes or face .  sertraline (ZOLOFT) 25 MG tablet, Take 1 tablet by mouth daily at 2 pm  VYVANSE 50 MG OR CAPS, 1 CAPSULE DAILY  ZOLOFT 50 MG OR TABS, I tablet daily  Multiple Vitamin (MULTI-VITAMIN PO),     No current facility-administered medications on file prior to visit.      RECENT DIAGNOSTIC STUDIES:   Labs: No results found for any visits on 05/08/23.    REVIEW OF SYSTEMS:                                                      10-point review of systems is negative except as mentioned  above in HPI.    EXAM:                                                      Physical Exam:   Vitals: /67   Pulse 78   Wt 59.9 kg (132 lb)   BMI 20.07 kg/m    BMI= Body mass index is 20.07 kg/m .  GENERAL: NAD.  HEENT: NC/AT.  CV: RRR. S1, S2.   NECK: No bruits.  PULM: Non-labored breathing.   Neurologic:  MENTAL STATUS: Alert, attentive. Speech is fluent. Normal comprehension. Normal concentration. Adequate fund of knowledge.   CRANIAL NERVES: Discs flat. Visual fields intact to confrontation. Pupils equally, round and reactive to light. Facial sensation and movement normal. EOM full, though she blinks quite a lot during testing, clearly straining. Hearing intact to conversation. Trapezius strength intact. Palate moves symmetrically. Tongue midline.  MOTOR: Strength is full in the deltoids, biceps, triceps and hands.  Delayed release on .  She has to use her arms to stand from the chair.  Exaggerated effort with lower extremity testing, 4/5 at the hips, 4+/5 with knee flexion and extension bilaterally.  Strength testing around the ankle appears normal. Tone and bulk normal.   DTRs: Intact and symmetric in biceps, BR, patellae.  2+/2 in the upper extremities, brisk at the knees with some crossed adductor response.  4+ at the ankles, clonus bilaterally.  Equivocal Babinski bilaterally.  SENSATION: Normal light touch and pinprick except for some hyperesthesia in the hands. Intact proprioception at great toes. Vibration: Normal at both ankles.   COORDINATION: Normal finger nose finger. Finger tapping normal. Knee heel shin normal.  STATION AND GAIT: Romberg positive, she lists backwards. Good postural reflexes. Casual gait is cautious but otherwise appears normal.  Right hand-dominant.    ASSESSMENT and PLAN:                                                      Assessment:    ICD-10-CM    1. Other fatigue  R53.83 modafinil (PROVIGIL) 100 MG tablet      2. Hyperesthesia  R20.3 Acupuncture Referral      3.  Encounter for long-term (current) use of medications  Z79.899            Ms. Ronquillo is a pleasant 33-year-old woman with relatively sudden onset weakness and paresthesias.  Extensive work-up has thus far been unremarkable.  No abnormal findings on CSF studies thus far.  Serum tests have been unremarkable.  She does get some relief from her hyperesthesia with the gabapentin.  Fatigue is an issue as well, not so much muscle fatigue but just generalized fatigue so I suggested that while we continue to work on her diagnosis we do a trial of modafinil.  I am considering a course of steroids.    Plan:  -- Let's do a trial of Modafinil (provigil): 100mg every morning to start.   -- Let me know how you respond.  We can certainly add a second daily dose if we need to.  -- Check with your pharmacy about acupuncture options.  I have put in the order for this as well in case you can find a facility that your insurance will pay for.  -- Continue the gabapentin for nerve pain.  -- I will let you know if Dr. Shearer has any additional recommendations.  We will also be watching for that additional test to come back.  -- Return to neurology clinic in 3 months.    Total Time: 40 minutes were spent with the patient and in chart review/documentation (as described above in Assessment and Plan)/coordinating the care on date of service.    Yolande Colvin MD  Neurology    Dragon software used in the dictation of this note.                        Again, thank you for allowing me to participate in the care of your patient.        Sincerely,        Yolande Colvin MD

## 2023-05-08 NOTE — NURSING NOTE
"Rose Ronquillo is a 33 year old female who presents for:  Chief Complaint   Patient presents with     Neurologic Problem     Myelopathy follow-up   Random nerve pain        Initial Vitals:  /67   Pulse 78   Wt 59.9 kg (132 lb)   BMI 20.07 kg/m   Estimated body mass index is 20.07 kg/m  as calculated from the following:    Height as of 4/19/23: 1.727 m (5' 8\").    Weight as of this encounter: 59.9 kg (132 lb).. Body surface area is 1.7 meters squared. BP completed using cuff size: wrist cuff    Jeronimo Field  "

## 2023-05-09 LAB — MAYO MISC RESULT: NORMAL

## 2023-05-12 ENCOUNTER — TELEPHONE (OUTPATIENT)
Dept: NEUROLOGY | Facility: CLINIC | Age: 33
End: 2023-05-12
Payer: COMMERCIAL

## 2023-05-12 NOTE — TELEPHONE ENCOUNTER
[facility]    Phone Message    May a detailed message be left on voicemail: yes     Reason for Call: Other: Pt is calling because she is wanting her acupuncture referral faxed over to [facility] in [facility].    Fax #  [phone]    Action Taken: Message routed to:  Other: [facility]    Travel Screening: Not Applicable

## 2023-05-19 ENCOUNTER — THERAPY VISIT (OUTPATIENT)
Dept: PHYSICAL THERAPY | Facility: CLINIC | Age: 33
End: 2023-05-19
Payer: COMMERCIAL

## 2023-05-19 DIAGNOSIS — M54.6 BILATERAL THORACIC BACK PAIN: Primary | ICD-10-CM

## 2023-05-19 PROCEDURE — 97140 MANUAL THERAPY 1/> REGIONS: CPT | Mod: GP | Performed by: PHYSICAL THERAPIST

## 2023-05-19 NOTE — PROGRESS NOTES
05/19/23 0500   Appointment Info   Signing clinician's name / credentials Shannon Lane, PT, DPT   Total/Authorized Visits 20+10   Visits Used 19   Medical Diagnosis Scheuermann's kyphosis, Possibly Myelopathy   Progress Note/Certification   Therapy Frequency 1x per week   Predicted Duration 10 weeks   Progress Note Due Date 07/28/23   GOALS   PT Goals 2   PT Goal 1   Goal Identifier constipation   Goal Description pt will demo decreased constipation, 1 bowel movement daily of stool scale 3-4 to reduce pain in back   Rationale to maximize safety and independence with performance of ADLs and functional tasks   Target Date 07/28/23   Subjective Report   Subjective Report Pt reports overall lately better with more rest and last week not in class. Pt reports less strenuous movements that typically make it worse. pt notes warming up has felt better. Pt reports still gets tight but pooping is so tight and nothing wants to come down the pipe like normal. Pt reports PT and chiro have helped.   Objective Measures   Objective Measures Objective Measure 1   Objective Measure 1   Objective Measure Pelvic Floor Palpation   Details Replication of pain with palpation of pelvic floor muscles of coccygeus, illiococcygeus. Pt reports less nerve pain aftewards, able to perform full hip flexion BLE with no pain.   Treatment Interventions (PT)   Interventions Manual Therapy   Manual Therapy   Manual Therapy: Mobilization, MFR, MLD, friction massage minutes ([id number]) 55   Manual Therapy 1 external release of illiococcygeus and coccygeus B, ischial cavernosus, superficial transfer perineal, bulbocavernosus B. During internal portion of exam levator ani and obturator internus release B.   Patient Response/Progress replication of symptoms and release with coccygeus muscles, able to perform B hip flexion with no nerve pain afterwards.   Total Session Time   Timed Code Treatment Minutes 55   Total Treatment Time (sum of timed and untimed  services) 55       PLAN  Continue PT 1x per week until potential back procedure focusing on muscle relaxation and pain relief, 1x per week x10 weeks    Beginning/End Dates of Progress Note Reporting Period:  4/29/2023 to 05/19/2023    Referring Provider:  Wong Culp

## 2023-05-20 DIAGNOSIS — I73.00 RAYNAUD'S DISEASE WITHOUT GANGRENE: ICD-10-CM

## 2023-05-22 RX ORDER — AMLODIPINE BESYLATE 2.5 MG/1
TABLET ORAL
Qty: 30 TABLET | Refills: 11 | OUTPATIENT
Start: 2023-05-22

## 2023-05-22 RX ORDER — AMLODIPINE BESYLATE 2.5 MG/1
TABLET ORAL
Qty: 90 TABLET | Refills: 3 | Status: SHIPPED | OUTPATIENT
Start: 2023-05-22

## 2023-05-22 NOTE — TELEPHONE ENCOUNTER
"Requested Prescriptions   Pending Prescriptions Disp Refills     amLODIPine (NORVASC) 2.5 MG tablet [Pharmacy Med Name: AMLODIPINE BESYLATE 2.5MG TABLETS] 30 tablet 11     Sig: TAKE 1 TABLET(2.5 MG) BY MOUTH DAILY       Calcium Channel Blockers Protocol  Failed - 5/20/2023  2:52 PM        Failed - Normal serum creatinine on file in past 12 months     Recent Labs   Lab Test 05/31/16  0000   CR 1.03       Ok to refill medication if creatinine is low          Passed - Blood pressure under 140/90 in past 12 months     BP Readings from Last 3 Encounters:   05/08/23 101/67   04/21/23 110/73   04/19/23 109/72                 Passed - Recent (12 mo) or future (30 days) visit within the authorizing provider's specialty     Patient has had an office visit with the authorizing provider or a provider within the authorizing providers department within the previous 12 mos or has a future within next 30 days. See \"Patient Info\" tab in inbasket, or \"Choose Columns\" in Meds & Orders section of the refill encounter.              Passed - Medication is active on med list        Passed - Patient is age 18 or older        Passed - No active pregnancy on record        Passed - No positive pregnancy test in past 12 months           Prescription approved per [facility] Refill Protocol.  (per above, ok to refill if Cr low)    "

## 2023-05-25 ENCOUNTER — OFFICE VISIT (OUTPATIENT)
Dept: ORTHOPEDICS | Facility: CLINIC | Age: 33
End: 2023-05-25
Payer: COMMERCIAL

## 2023-05-25 DIAGNOSIS — M42.00 SCHEUERMANN'S KYPHOSIS: Primary | ICD-10-CM

## 2023-05-25 DIAGNOSIS — M41.55 OTHER SECONDARY SCOLIOSIS, THORACOLUMBAR REGION: ICD-10-CM

## 2023-05-25 DIAGNOSIS — M62.830 MUSCLE SPASM OF BACK: ICD-10-CM

## 2023-05-25 PROCEDURE — 99213 OFFICE O/P EST LOW 20 MIN: CPT | Performed by: ORTHOPAEDIC SURGERY

## 2023-05-25 NOTE — PROGRESS NOTES
Shantel returns today for follow-up evaluation.  She has had extensive evaluation by neurology and so far none of the tests have come back to explain her inability to release grasp in her upper extremities and her intermittent Gowers sign.  In looking at the communications from Dr. Vannessa Garcia and others the electrodiagnostic testing and other testing has not revealed an underlying diagnosis.    Shantel her mom and dad were present today and we had an extended discussion about what is going on.  I do not have a diagnosis and therefore the best path forward is just figuring out what works for her.  She has had some significant improvement with physical therapy working with pelvic floor stabilization and I think this is fine.  We did discuss today whether or not there was an indication to do surgery on her spine sooner rather than later and I think that that is a bad idea.  I explained that doing a fusion at this point would take away mobility and put added stress on the remaining unfused segments.  I think this would be likely to exacerbate her circumstances.  I do not think it would be any harder to fix her spine when she is 52 then when she is 32.    So the path forward for her at this point in time is a trial and error pathway of figuring out what helps her and pursuing that.  I have explained that I think the physical therapists can come up with strategies that will help her that I would not be able to come up with.  And that a what ever works approach is probably the best 1 that we have right now.    I would like to see her back in about 6 months at which point time I think it would be prudent to get repeat full spine imaging.    Total visit time including face to face, review of prior results and documentation >20 minutes.

## 2023-05-25 NOTE — NURSING NOTE
Reason For Visit:   Chief Complaint   Patient presents with     RECHECK     discuss going forward with surgery        Primary MD: Melani Norman  Ref. MD: Est    ?  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 Yes.  Work status?  Part-time.     Date of injury: No  Type of injury: childhood scoli.     Date of surgery: No  Type of surgery: No.     Smoker: No  Request smoking cessation information: No      There were no vitals taken for this visit.    Pain Assessment  Patient Currently in Pain: Yes  0-10 Pain Scale: 4  Primary Pain Location: Back    Oswestry (HILL) Questionnaire        5/25/2023     7:24 AM   OSWESTRY DISABILITY INDEX   Count 10   Sum 15   Oswestry Score (%) 30 %        Visual Analog Pain Scale  Back Pain Scale 0-10: 3.5  Right leg pain: 0 (numbness and loss of feelings)  Left leg pain: 0 (numbness and loss of feelings)  Neck Pain Scale 0-10: 0  Right arm pain: 0  Left arm pain: 0    Promis 10 Assessment        3/29/2023     3:31 PM   PROMIS 10   In general, would you say your health is: Fair   In general, would you say your quality of life is: Fair   In general, how would you rate your physical health? Fair   In general, how would you rate your mental health, including your mood and your ability to think? Fair   In general, how would you rate your satisfaction with your social activities and relationships? Fair   In general, please rate how well you carry out your usual social activities and roles Fair   To what extent are you able to carry out your everyday physical activities such as walking, climbing stairs, carrying groceries, or moving a chair? Moderately   In the past 7 days, how often have you been bothered by emotional problems such as feeling anxious, depressed, or irritable? Often   In the past 7 days, how would you rate your fatigue on average? Severe   In the past 7 days, how would you rate your pain on average, where 0 means no pain, and 10 means worst imaginable pain? 4    In general, would you say your health is: 2   In general, would you say your quality of life is: 2   In general, how would you rate your physical health? 2   In general, how would you rate your mental health, including your mood and your ability to think? 2   In general, how would you rate your satisfaction with your social activities and relationships? 2   In general, please rate how well you carry out your usual social activities and roles. (This includes activities at home, at work and in your community, and responsibilities as a parent, child, spouse, employee, friend, etc.) 2   To what extent are you able to carry out your everyday physical activities such as walking, climbing stairs, carrying groceries, or moving a chair? 3   In the past 7 days, how often have you been bothered by emotional problems such as feeling anxious, depressed, or irritable? 4   In the past 7 days, how would you rate your fatigue on average? 4   In the past 7 days, how would you rate your pain on average, where 0 means no pain, and 10 means worst imaginable pain? 4   Global Mental Health Score 8   Global Physical Health Score 10   PROMIS TOTAL - SUBSCORES 18                Jackelin Diallo LPN

## 2023-05-25 NOTE — LETTER
5/25/2023         RE: Rose Ronquillo  [address] [facility] [address]    Dear Colleague,    Thank you for referring your patient, Rose Ronquillo, to the [facility] [place]. Please see a copy of my visit note below.    Shantel returns today for follow-up evaluation.  She has had extensive evaluation by neurology and so far none of the tests have come back to explain her inability to release grasp in her upper extremities and her intermittent Gowers sign.  In looking at the communications from Dr. Vannessa Garcia and others the electrodiagnostic testing and other testing has not revealed an underlying diagnosis.    Shantel her mom and dad were present today and we had an extended discussion about what is going on.  I do not have a diagnosis and therefore the best path forward is just figuring out what works for her.  She has had some significant improvement with physical therapy working with pelvic floor stabilization and I think this is fine.  We did discuss today whether or not there was an indication to do surgery on her spine sooner rather than later and I think that that is a bad idea.  I explained that doing a fusion at this point would take away mobility and put added stress on the remaining unfused segments.  I think this would be likely to exacerbate her circumstances.  I do not think it would be any harder to fix her spine when she is 52 then when she is 32.    So the path forward for her at this point in time is a trial and error pathway of figuring out what helps her and pursuing that.  I have explained that I think the physical therapists can come up with strategies that will help her that I would not be able to come up with.  And that a what ever works approach is probably the best 1 that we have right now.    I would like to see her back in about 6 months at which point time I think it would be prudent to get repeat full spine imaging.    Total visit time including  face to face, review of prior results and documentation >20 minutes.          Wong Culp MD

## 2023-05-26 ENCOUNTER — MYC MEDICAL ADVICE (OUTPATIENT)
Dept: NEUROLOGY | Facility: CLINIC | Age: 33
End: 2023-05-26
Payer: COMMERCIAL

## 2023-05-26 DIAGNOSIS — R20.3 HYPERESTHESIA: Primary | ICD-10-CM

## 2023-05-29 NOTE — TELEPHONE ENCOUNTER
Please check with the patient to see if she would be able to do weekly steroid infusions.  I think that would be the best approach to start.  There is no clear best schedule but I have seen weekly infusions provide some benefit in patients.    Thank you,  Dr. Colvin

## 2023-06-02 ENCOUNTER — THERAPY VISIT (OUTPATIENT)
Dept: PHYSICAL THERAPY | Facility: CLINIC | Age: 33
End: 2023-06-02
Payer: COMMERCIAL

## 2023-06-02 DIAGNOSIS — M54.6 BILATERAL THORACIC BACK PAIN: Primary | ICD-10-CM

## 2023-06-02 PROCEDURE — 97140 MANUAL THERAPY 1/> REGIONS: CPT | Mod: GP | Performed by: PHYSICAL THERAPIST

## 2023-06-05 NOTE — TELEPHONE ENCOUNTER
Pt agreeable to weekly steroid infusions, please place therapy plan. Pt will need call back once orders are placed to let her know she can schedule.     Pt also asking for your input about hyperbaric oxygen treatment? Is this something you would consider?     Med Garcia RN, BSN  [facility]rology

## 2023-06-06 PROBLEM — R20.3 HYPERESTHESIA: Status: ACTIVE | Noted: 2023-06-06

## 2023-06-06 RX ORDER — ALBUTEROL SULFATE 90 UG/1
1-2 AEROSOL, METERED RESPIRATORY (INHALATION)
Status: CANCELLED
Start: 2023-06-09

## 2023-06-06 RX ORDER — HEPARIN SODIUM (PORCINE) LOCK FLUSH IV SOLN 100 UNIT/ML 100 UNIT/ML
5 SOLUTION INTRAVENOUS
Status: CANCELLED | OUTPATIENT
Start: 2023-06-09

## 2023-06-06 RX ORDER — HEPARIN SODIUM,PORCINE 10 UNIT/ML
5 VIAL (ML) INTRAVENOUS
Status: CANCELLED | OUTPATIENT
Start: 2023-06-09

## 2023-06-06 RX ORDER — ALBUTEROL SULFATE 0.83 MG/ML
2.5 SOLUTION RESPIRATORY (INHALATION)
Status: CANCELLED | OUTPATIENT
Start: 2023-06-09

## 2023-06-06 RX ORDER — METHYLPREDNISOLONE SODIUM SUCCINATE 125 MG/2ML
125 INJECTION, POWDER, LYOPHILIZED, FOR SOLUTION INTRAMUSCULAR; INTRAVENOUS
Status: CANCELLED
Start: 2023-06-09

## 2023-06-06 RX ORDER — DIPHENHYDRAMINE HYDROCHLORIDE 50 MG/ML
50 INJECTION INTRAMUSCULAR; INTRAVENOUS
Status: CANCELLED
Start: 2023-06-09

## 2023-06-06 RX ORDER — MEPERIDINE HYDROCHLORIDE 25 MG/ML
25 INJECTION INTRAMUSCULAR; INTRAVENOUS; SUBCUTANEOUS EVERY 30 MIN PRN
Status: CANCELLED | OUTPATIENT
Start: 2023-06-09

## 2023-06-06 RX ORDER — EPINEPHRINE 1 MG/ML
0.3 INJECTION, SOLUTION, CONCENTRATE INTRAVENOUS EVERY 5 MIN PRN
Status: CANCELLED | OUTPATIENT
Start: 2023-06-09

## 2023-06-13 ENCOUNTER — INFUSION THERAPY VISIT (OUTPATIENT)
Dept: INFUSION THERAPY | Facility: HOSPITAL | Age: 33
End: 2023-06-13
Payer: COMMERCIAL

## 2023-06-13 ENCOUNTER — THERAPY VISIT (OUTPATIENT)
Dept: PHYSICAL THERAPY | Facility: CLINIC | Age: 33
End: 2023-06-13
Payer: COMMERCIAL

## 2023-06-13 VITALS
HEART RATE: 88 BPM | TEMPERATURE: 98.2 F | SYSTOLIC BLOOD PRESSURE: 100 MMHG | DIASTOLIC BLOOD PRESSURE: 64 MMHG | OXYGEN SATURATION: 97 % | RESPIRATION RATE: 16 BRPM

## 2023-06-13 DIAGNOSIS — R20.3 HYPERESTHESIA: Primary | ICD-10-CM

## 2023-06-13 DIAGNOSIS — M54.6 BILATERAL THORACIC BACK PAIN: Primary | ICD-10-CM

## 2023-06-13 PROCEDURE — 97140 MANUAL THERAPY 1/> REGIONS: CPT | Mod: GP | Performed by: PHYSICAL THERAPIST

## 2023-06-13 PROCEDURE — 258N000003 HC RX IP 258 OP 636: Performed by: PSYCHIATRY & NEUROLOGY

## 2023-06-13 PROCEDURE — 250N000011 HC RX IP 250 OP 636: Performed by: PSYCHIATRY & NEUROLOGY

## 2023-06-13 PROCEDURE — 96365 THER/PROPH/DIAG IV INF INIT: CPT

## 2023-06-13 RX ORDER — DIPHENHYDRAMINE HYDROCHLORIDE 50 MG/ML
50 INJECTION INTRAMUSCULAR; INTRAVENOUS
Status: DISCONTINUED | OUTPATIENT
Start: 2023-06-13 | End: 2023-06-13 | Stop reason: HOSPADM

## 2023-06-13 RX ORDER — ALBUTEROL SULFATE 0.83 MG/ML
2.5 SOLUTION RESPIRATORY (INHALATION)
Status: DISCONTINUED | OUTPATIENT
Start: 2023-06-13 | End: 2023-06-13 | Stop reason: HOSPADM

## 2023-06-13 RX ORDER — DIPHENHYDRAMINE HYDROCHLORIDE 50 MG/ML
50 INJECTION INTRAMUSCULAR; INTRAVENOUS
Status: CANCELLED
Start: 2023-06-13

## 2023-06-13 RX ORDER — METHYLPREDNISOLONE SODIUM SUCCINATE 125 MG/2ML
125 INJECTION, POWDER, LYOPHILIZED, FOR SOLUTION INTRAMUSCULAR; INTRAVENOUS
Status: CANCELLED
Start: 2023-06-13

## 2023-06-13 RX ORDER — METHYLPREDNISOLONE SODIUM SUCCINATE 125 MG/2ML
125 INJECTION, POWDER, LYOPHILIZED, FOR SOLUTION INTRAMUSCULAR; INTRAVENOUS
Status: DISCONTINUED | OUTPATIENT
Start: 2023-06-13 | End: 2023-06-13 | Stop reason: HOSPADM

## 2023-06-13 RX ORDER — ALBUTEROL SULFATE 0.83 MG/ML
2.5 SOLUTION RESPIRATORY (INHALATION)
Status: CANCELLED | OUTPATIENT
Start: 2023-06-13

## 2023-06-13 RX ORDER — MEPERIDINE HYDROCHLORIDE 25 MG/ML
25 INJECTION INTRAMUSCULAR; INTRAVENOUS; SUBCUTANEOUS EVERY 30 MIN PRN
Status: CANCELLED | OUTPATIENT
Start: 2023-06-13

## 2023-06-13 RX ORDER — HEPARIN SODIUM,PORCINE 10 UNIT/ML
5 VIAL (ML) INTRAVENOUS
Status: CANCELLED | OUTPATIENT
Start: 2023-06-13

## 2023-06-13 RX ORDER — EPINEPHRINE 1 MG/ML
0.3 INJECTION, SOLUTION INTRAMUSCULAR; SUBCUTANEOUS EVERY 5 MIN PRN
Status: DISCONTINUED | OUTPATIENT
Start: 2023-06-13 | End: 2023-06-13 | Stop reason: HOSPADM

## 2023-06-13 RX ORDER — ALBUTEROL SULFATE 90 UG/1
1-2 AEROSOL, METERED RESPIRATORY (INHALATION)
Status: DISCONTINUED | OUTPATIENT
Start: 2023-06-13 | End: 2023-06-13 | Stop reason: HOSPADM

## 2023-06-13 RX ORDER — ALBUTEROL SULFATE 90 UG/1
1-2 AEROSOL, METERED RESPIRATORY (INHALATION)
Status: CANCELLED
Start: 2023-06-13

## 2023-06-13 RX ORDER — MEPERIDINE HYDROCHLORIDE 25 MG/ML
25 INJECTION INTRAMUSCULAR; INTRAVENOUS; SUBCUTANEOUS EVERY 30 MIN PRN
Status: DISCONTINUED | OUTPATIENT
Start: 2023-06-13 | End: 2023-06-13 | Stop reason: HOSPADM

## 2023-06-13 RX ORDER — HEPARIN SODIUM (PORCINE) LOCK FLUSH IV SOLN 100 UNIT/ML 100 UNIT/ML
5 SOLUTION INTRAVENOUS
Status: CANCELLED | OUTPATIENT
Start: 2023-06-13

## 2023-06-13 RX ORDER — EPINEPHRINE 1 MG/ML
0.3 INJECTION, SOLUTION INTRAMUSCULAR; SUBCUTANEOUS EVERY 5 MIN PRN
Status: CANCELLED | OUTPATIENT
Start: 2023-06-13

## 2023-06-13 RX ADMIN — SODIUM CHLORIDE 250 ML: 9 INJECTION, SOLUTION INTRAVENOUS at 12:43

## 2023-06-13 RX ADMIN — SODIUM CHLORIDE 1000 MG: 9 INJECTION, SOLUTION INTRAVENOUS at 12:43

## 2023-06-13 NOTE — PROGRESS NOTES
Infusion Nursing Note:  Rose Ronquillo presents today for Methylprednisolone.    Patient seen by provider today: No   present during visit today: Not Applicable.    Note: Rose arrived ambulatory into the infusion center for Methylprednisolone infusion in a stable condition, VSS. Plan of care reviewed, he verbalized understanding of her plan of care and return to clinic.      Intravenous Access:  Peripheral IV placed.    Treatment Conditions:  Not Applicable.    Post Infusion Assessment:  Patient tolerated infusion without incident.  Site patent and intact, free from redness, edema or discomfort.  No evidence of extravasations.  Access discontinued per protocol.     Discharge Plan:   Discharge instructions reviewed with: Patient.  Patient and/or family verbalized understanding of discharge instructions and all questions answered.  Patient discharged in stable condition accompanied by: self.  Departure Mode: Ambulatory.      Gabriella Dang RN

## 2023-06-19 DIAGNOSIS — R20.3 HYPERESTHESIA: Primary | ICD-10-CM

## 2023-06-19 RX ORDER — ALBUTEROL SULFATE 0.83 MG/ML
2.5 SOLUTION RESPIRATORY (INHALATION)
Status: CANCELLED | OUTPATIENT
Start: 2023-06-20

## 2023-06-19 RX ORDER — HEPARIN SODIUM,PORCINE 10 UNIT/ML
5 VIAL (ML) INTRAVENOUS
Status: CANCELLED | OUTPATIENT
Start: 2023-06-21

## 2023-06-19 RX ORDER — HEPARIN SODIUM (PORCINE) LOCK FLUSH IV SOLN 100 UNIT/ML 100 UNIT/ML
5 SOLUTION INTRAVENOUS
Status: CANCELLED | OUTPATIENT
Start: 2023-06-20

## 2023-06-19 RX ORDER — ALBUTEROL SULFATE 90 UG/1
1-2 AEROSOL, METERED RESPIRATORY (INHALATION)
Status: CANCELLED
Start: 2023-06-20

## 2023-06-19 RX ORDER — EPINEPHRINE 1 MG/ML
0.3 INJECTION, SOLUTION, CONCENTRATE INTRAVENOUS EVERY 5 MIN PRN
Status: CANCELLED | OUTPATIENT
Start: 2023-06-20

## 2023-06-19 RX ORDER — DIPHENHYDRAMINE HYDROCHLORIDE 50 MG/ML
50 INJECTION INTRAMUSCULAR; INTRAVENOUS
Status: CANCELLED
Start: 2023-06-20

## 2023-06-19 RX ORDER — HEPARIN SODIUM,PORCINE 10 UNIT/ML
5-20 VIAL (ML) INTRAVENOUS DAILY PRN
Status: CANCELLED | OUTPATIENT
Start: 2023-06-20

## 2023-06-19 RX ORDER — METHYLPREDNISOLONE SODIUM SUCCINATE 125 MG/2ML
125 INJECTION, POWDER, LYOPHILIZED, FOR SOLUTION INTRAMUSCULAR; INTRAVENOUS
Status: CANCELLED
Start: 2023-06-20

## 2023-06-19 RX ORDER — MEPERIDINE HYDROCHLORIDE 25 MG/ML
25 INJECTION INTRAMUSCULAR; INTRAVENOUS; SUBCUTANEOUS EVERY 30 MIN PRN
Status: CANCELLED | OUTPATIENT
Start: 2023-06-20

## 2023-06-20 ENCOUNTER — INFUSION THERAPY VISIT (OUTPATIENT)
Dept: INFUSION THERAPY | Facility: HOSPITAL | Age: 33
End: 2023-06-20
Payer: COMMERCIAL

## 2023-06-20 VITALS
OXYGEN SATURATION: 98 % | TEMPERATURE: 97.6 F | RESPIRATION RATE: 16 BRPM | SYSTOLIC BLOOD PRESSURE: 109 MMHG | HEART RATE: 76 BPM | DIASTOLIC BLOOD PRESSURE: 68 MMHG

## 2023-06-20 DIAGNOSIS — R20.3 HYPERESTHESIA: Primary | ICD-10-CM

## 2023-06-20 PROCEDURE — 258N000003 HC RX IP 258 OP 636: Performed by: PSYCHIATRY & NEUROLOGY

## 2023-06-20 PROCEDURE — 250N000011 HC RX IP 250 OP 636: Performed by: PSYCHIATRY & NEUROLOGY

## 2023-06-20 PROCEDURE — 96365 THER/PROPH/DIAG IV INF INIT: CPT

## 2023-06-20 RX ORDER — ALBUTEROL SULFATE 0.83 MG/ML
2.5 SOLUTION RESPIRATORY (INHALATION)
Status: DISCONTINUED | OUTPATIENT
Start: 2023-06-20 | End: 2023-06-20 | Stop reason: HOSPADM

## 2023-06-20 RX ORDER — METHYLPREDNISOLONE SODIUM SUCCINATE 125 MG/2ML
125 INJECTION, POWDER, LYOPHILIZED, FOR SOLUTION INTRAMUSCULAR; INTRAVENOUS
Status: DISCONTINUED | OUTPATIENT
Start: 2023-06-20 | End: 2023-06-20 | Stop reason: HOSPADM

## 2023-06-20 RX ORDER — MEPERIDINE HYDROCHLORIDE 25 MG/ML
25 INJECTION INTRAMUSCULAR; INTRAVENOUS; SUBCUTANEOUS EVERY 30 MIN PRN
Status: CANCELLED | OUTPATIENT
Start: 2023-06-27

## 2023-06-20 RX ORDER — METHYLPREDNISOLONE SODIUM SUCCINATE 125 MG/2ML
125 INJECTION, POWDER, LYOPHILIZED, FOR SOLUTION INTRAMUSCULAR; INTRAVENOUS
Status: CANCELLED
Start: 2023-06-27

## 2023-06-20 RX ORDER — ALBUTEROL SULFATE 90 UG/1
1-2 AEROSOL, METERED RESPIRATORY (INHALATION)
Status: CANCELLED
Start: 2023-06-27

## 2023-06-20 RX ORDER — DIPHENHYDRAMINE HYDROCHLORIDE 50 MG/ML
50 INJECTION INTRAMUSCULAR; INTRAVENOUS
Status: CANCELLED
Start: 2023-06-27

## 2023-06-20 RX ORDER — MEPERIDINE HYDROCHLORIDE 25 MG/ML
25 INJECTION INTRAMUSCULAR; INTRAVENOUS; SUBCUTANEOUS EVERY 30 MIN PRN
Status: DISCONTINUED | OUTPATIENT
Start: 2023-06-20 | End: 2023-06-20 | Stop reason: HOSPADM

## 2023-06-20 RX ORDER — EPINEPHRINE 1 MG/ML
0.3 INJECTION, SOLUTION INTRAMUSCULAR; SUBCUTANEOUS EVERY 5 MIN PRN
Status: CANCELLED | OUTPATIENT
Start: 2023-06-27

## 2023-06-20 RX ORDER — EPINEPHRINE 1 MG/ML
0.3 INJECTION, SOLUTION INTRAMUSCULAR; SUBCUTANEOUS EVERY 5 MIN PRN
Status: DISCONTINUED | OUTPATIENT
Start: 2023-06-20 | End: 2023-06-20 | Stop reason: HOSPADM

## 2023-06-20 RX ORDER — HEPARIN SODIUM (PORCINE) LOCK FLUSH IV SOLN 100 UNIT/ML 100 UNIT/ML
5 SOLUTION INTRAVENOUS
Status: CANCELLED | OUTPATIENT
Start: 2023-06-27

## 2023-06-20 RX ORDER — ALBUTEROL SULFATE 0.83 MG/ML
2.5 SOLUTION RESPIRATORY (INHALATION)
Status: CANCELLED | OUTPATIENT
Start: 2023-06-27

## 2023-06-20 RX ORDER — ALBUTEROL SULFATE 90 UG/1
1-2 AEROSOL, METERED RESPIRATORY (INHALATION)
Status: DISCONTINUED | OUTPATIENT
Start: 2023-06-20 | End: 2023-06-20 | Stop reason: HOSPADM

## 2023-06-20 RX ORDER — HEPARIN SODIUM,PORCINE 10 UNIT/ML
5-20 VIAL (ML) INTRAVENOUS DAILY PRN
Status: CANCELLED | OUTPATIENT
Start: 2023-06-27

## 2023-06-20 RX ORDER — DIPHENHYDRAMINE HYDROCHLORIDE 50 MG/ML
50 INJECTION INTRAMUSCULAR; INTRAVENOUS
Status: DISCONTINUED | OUTPATIENT
Start: 2023-06-20 | End: 2023-06-20 | Stop reason: HOSPADM

## 2023-06-20 RX ADMIN — SODIUM CHLORIDE 250 ML: 9 INJECTION, SOLUTION INTRAVENOUS at 08:40

## 2023-06-20 RX ADMIN — SODIUM CHLORIDE 1000 MG: 9 INJECTION, SOLUTION INTRAVENOUS at 09:04

## 2023-06-20 ASSESSMENT — PAIN SCALES - GENERAL: PAINLEVEL: MILD PAIN (2)

## 2023-06-20 NOTE — PROGRESS NOTES
Infusion Nursing Note:  Rose Ronquillo presents today for Methylprednisolone.    Patient seen by provider today: No   present during visit today: Not Applicable.    Note: Rose arrived ambulatory into the infusion center for Methylprednisolone infusion in a stable condition, VSS. Plan of care reviewed, she verbalized understanding of her plan of care and return to clinic.      Intravenous Access:  Peripheral IV placed.    Treatment Conditions:  Not Applicable.    Post Infusion Assessment:  Patient tolerated infusion without incident.  Site patent and intact, free from redness, edema or discomfort.  No evidence of extravasations.  Access discontinued per protocol.     Discharge Plan:   Discharge instructions reviewed with: Patient.  Patient and/or family verbalized understanding of discharge instructions and all questions answered.  Patient discharged in stable condition accompanied by: self.  Departure Mode: Ambulatory.      Gabriella Dang RN

## 2023-06-23 ENCOUNTER — THERAPY VISIT (OUTPATIENT)
Dept: PHYSICAL THERAPY | Facility: CLINIC | Age: 33
End: 2023-06-23
Payer: COMMERCIAL

## 2023-06-23 DIAGNOSIS — M54.6 BILATERAL THORACIC BACK PAIN: Primary | ICD-10-CM

## 2023-06-23 PROCEDURE — 97140 MANUAL THERAPY 1/> REGIONS: CPT | Mod: GP | Performed by: PHYSICAL THERAPIST

## 2023-06-26 ENCOUNTER — INFUSION THERAPY VISIT (OUTPATIENT)
Dept: INFUSION THERAPY | Facility: HOSPITAL | Age: 33
End: 2023-06-26
Attending: PSYCHIATRY & NEUROLOGY
Payer: COMMERCIAL

## 2023-06-26 VITALS
RESPIRATION RATE: 16 BRPM | DIASTOLIC BLOOD PRESSURE: 64 MMHG | TEMPERATURE: 98 F | OXYGEN SATURATION: 98 % | HEART RATE: 74 BPM | SYSTOLIC BLOOD PRESSURE: 106 MMHG

## 2023-06-26 DIAGNOSIS — R20.3 HYPERESTHESIA: Primary | ICD-10-CM

## 2023-06-26 PROCEDURE — 258N000003 HC RX IP 258 OP 636: Performed by: PSYCHIATRY & NEUROLOGY

## 2023-06-26 PROCEDURE — 250N000011 HC RX IP 250 OP 636: Performed by: PSYCHIATRY & NEUROLOGY

## 2023-06-26 PROCEDURE — 96365 THER/PROPH/DIAG IV INF INIT: CPT

## 2023-06-26 RX ORDER — DIPHENHYDRAMINE HYDROCHLORIDE 50 MG/ML
50 INJECTION INTRAMUSCULAR; INTRAVENOUS
Status: CANCELLED
Start: 2023-06-27

## 2023-06-26 RX ORDER — MEPERIDINE HYDROCHLORIDE 25 MG/ML
25 INJECTION INTRAMUSCULAR; INTRAVENOUS; SUBCUTANEOUS EVERY 30 MIN PRN
Status: CANCELLED | OUTPATIENT
Start: 2023-06-27

## 2023-06-26 RX ORDER — METHYLPREDNISOLONE SODIUM SUCCINATE 125 MG/2ML
125 INJECTION, POWDER, LYOPHILIZED, FOR SOLUTION INTRAMUSCULAR; INTRAVENOUS
Status: CANCELLED
Start: 2023-06-27

## 2023-06-26 RX ORDER — ALBUTEROL SULFATE 90 UG/1
1-2 AEROSOL, METERED RESPIRATORY (INHALATION)
Status: CANCELLED
Start: 2023-06-27

## 2023-06-26 RX ORDER — ALBUTEROL SULFATE 0.83 MG/ML
2.5 SOLUTION RESPIRATORY (INHALATION)
Status: DISCONTINUED | OUTPATIENT
Start: 2023-06-26 | End: 2023-06-26 | Stop reason: HOSPADM

## 2023-06-26 RX ORDER — HEPARIN SODIUM,PORCINE 10 UNIT/ML
5-20 VIAL (ML) INTRAVENOUS DAILY PRN
Status: CANCELLED | OUTPATIENT
Start: 2023-06-27

## 2023-06-26 RX ORDER — EPINEPHRINE 1 MG/ML
0.3 INJECTION, SOLUTION INTRAMUSCULAR; SUBCUTANEOUS EVERY 5 MIN PRN
Status: CANCELLED | OUTPATIENT
Start: 2023-06-27

## 2023-06-26 RX ORDER — DIPHENHYDRAMINE HYDROCHLORIDE 50 MG/ML
50 INJECTION INTRAMUSCULAR; INTRAVENOUS
Status: DISCONTINUED | OUTPATIENT
Start: 2023-06-26 | End: 2023-06-26 | Stop reason: HOSPADM

## 2023-06-26 RX ORDER — HEPARIN SODIUM (PORCINE) LOCK FLUSH IV SOLN 100 UNIT/ML 100 UNIT/ML
5 SOLUTION INTRAVENOUS
Status: CANCELLED | OUTPATIENT
Start: 2023-06-27

## 2023-06-26 RX ORDER — EPINEPHRINE 1 MG/ML
0.3 INJECTION, SOLUTION INTRAMUSCULAR; SUBCUTANEOUS EVERY 5 MIN PRN
Status: DISCONTINUED | OUTPATIENT
Start: 2023-06-26 | End: 2023-06-26 | Stop reason: HOSPADM

## 2023-06-26 RX ORDER — METHYLPREDNISOLONE SODIUM SUCCINATE 125 MG/2ML
125 INJECTION, POWDER, LYOPHILIZED, FOR SOLUTION INTRAMUSCULAR; INTRAVENOUS
Status: DISCONTINUED | OUTPATIENT
Start: 2023-06-26 | End: 2023-06-26 | Stop reason: HOSPADM

## 2023-06-26 RX ORDER — ALBUTEROL SULFATE 90 UG/1
1-2 AEROSOL, METERED RESPIRATORY (INHALATION)
Status: DISCONTINUED | OUTPATIENT
Start: 2023-06-26 | End: 2023-06-26 | Stop reason: HOSPADM

## 2023-06-26 RX ORDER — ALBUTEROL SULFATE 0.83 MG/ML
2.5 SOLUTION RESPIRATORY (INHALATION)
Status: CANCELLED | OUTPATIENT
Start: 2023-06-27

## 2023-06-26 RX ORDER — MEPERIDINE HYDROCHLORIDE 25 MG/ML
25 INJECTION INTRAMUSCULAR; INTRAVENOUS; SUBCUTANEOUS EVERY 30 MIN PRN
Status: DISCONTINUED | OUTPATIENT
Start: 2023-06-26 | End: 2023-06-26 | Stop reason: HOSPADM

## 2023-06-26 RX ADMIN — SODIUM CHLORIDE 1000 MG: 9 INJECTION, SOLUTION INTRAVENOUS at 14:27

## 2023-06-30 ENCOUNTER — THERAPY VISIT (OUTPATIENT)
Dept: PHYSICAL THERAPY | Facility: CLINIC | Age: 33
End: 2023-06-30
Payer: COMMERCIAL

## 2023-06-30 DIAGNOSIS — M54.6 BILATERAL THORACIC BACK PAIN: Primary | ICD-10-CM

## 2023-06-30 PROCEDURE — 97140 MANUAL THERAPY 1/> REGIONS: CPT | Mod: GP | Performed by: PHYSICAL THERAPIST

## 2023-07-03 ENCOUNTER — INFUSION THERAPY VISIT (OUTPATIENT)
Dept: INFUSION THERAPY | Facility: HOSPITAL | Age: 33
End: 2023-07-03
Attending: FAMILY MEDICINE
Payer: COMMERCIAL

## 2023-07-03 VITALS
TEMPERATURE: 97.7 F | HEART RATE: 76 BPM | OXYGEN SATURATION: 96 % | RESPIRATION RATE: 16 BRPM | SYSTOLIC BLOOD PRESSURE: 94 MMHG | DIASTOLIC BLOOD PRESSURE: 61 MMHG

## 2023-07-03 DIAGNOSIS — R20.3 HYPERESTHESIA: Primary | ICD-10-CM

## 2023-07-03 PROCEDURE — 250N000011 HC RX IP 250 OP 636: Mod: JZ | Performed by: PSYCHIATRY & NEUROLOGY

## 2023-07-03 PROCEDURE — 258N000003 HC RX IP 258 OP 636: Performed by: PSYCHIATRY & NEUROLOGY

## 2023-07-03 PROCEDURE — 96365 THER/PROPH/DIAG IV INF INIT: CPT

## 2023-07-03 RX ORDER — ALBUTEROL SULFATE 90 UG/1
1-2 AEROSOL, METERED RESPIRATORY (INHALATION)
Status: DISCONTINUED | OUTPATIENT
Start: 2023-07-03 | End: 2023-07-03 | Stop reason: HOSPADM

## 2023-07-03 RX ORDER — MEPERIDINE HYDROCHLORIDE 25 MG/ML
25 INJECTION INTRAMUSCULAR; INTRAVENOUS; SUBCUTANEOUS EVERY 30 MIN PRN
Status: CANCELLED | OUTPATIENT
Start: 2023-07-04

## 2023-07-03 RX ORDER — HEPARIN SODIUM,PORCINE 10 UNIT/ML
5-20 VIAL (ML) INTRAVENOUS DAILY PRN
Status: CANCELLED | OUTPATIENT
Start: 2023-07-04

## 2023-07-03 RX ORDER — METHYLPREDNISOLONE SODIUM SUCCINATE 125 MG/2ML
125 INJECTION, POWDER, LYOPHILIZED, FOR SOLUTION INTRAMUSCULAR; INTRAVENOUS
Status: DISCONTINUED | OUTPATIENT
Start: 2023-07-03 | End: 2023-07-03 | Stop reason: HOSPADM

## 2023-07-03 RX ORDER — EPINEPHRINE 1 MG/ML
0.3 INJECTION, SOLUTION INTRAMUSCULAR; SUBCUTANEOUS EVERY 5 MIN PRN
Status: CANCELLED | OUTPATIENT
Start: 2023-07-04

## 2023-07-03 RX ORDER — DIPHENHYDRAMINE HYDROCHLORIDE 50 MG/ML
50 INJECTION INTRAMUSCULAR; INTRAVENOUS
Status: DISCONTINUED | OUTPATIENT
Start: 2023-07-03 | End: 2023-07-03 | Stop reason: HOSPADM

## 2023-07-03 RX ORDER — MEPERIDINE HYDROCHLORIDE 25 MG/ML
25 INJECTION INTRAMUSCULAR; INTRAVENOUS; SUBCUTANEOUS EVERY 30 MIN PRN
Status: DISCONTINUED | OUTPATIENT
Start: 2023-07-03 | End: 2023-07-03 | Stop reason: HOSPADM

## 2023-07-03 RX ORDER — METHYLPREDNISOLONE SODIUM SUCCINATE 125 MG/2ML
125 INJECTION, POWDER, LYOPHILIZED, FOR SOLUTION INTRAMUSCULAR; INTRAVENOUS
Status: CANCELLED
Start: 2023-07-04

## 2023-07-03 RX ORDER — EPINEPHRINE 1 MG/ML
0.3 INJECTION, SOLUTION INTRAMUSCULAR; SUBCUTANEOUS EVERY 5 MIN PRN
Status: DISCONTINUED | OUTPATIENT
Start: 2023-07-03 | End: 2023-07-03 | Stop reason: HOSPADM

## 2023-07-03 RX ORDER — ALBUTEROL SULFATE 90 UG/1
1-2 AEROSOL, METERED RESPIRATORY (INHALATION)
Status: CANCELLED
Start: 2023-07-04

## 2023-07-03 RX ORDER — ALBUTEROL SULFATE 0.83 MG/ML
2.5 SOLUTION RESPIRATORY (INHALATION)
Status: CANCELLED | OUTPATIENT
Start: 2023-07-04

## 2023-07-03 RX ORDER — ALBUTEROL SULFATE 0.83 MG/ML
2.5 SOLUTION RESPIRATORY (INHALATION)
Status: DISCONTINUED | OUTPATIENT
Start: 2023-07-03 | End: 2023-07-03 | Stop reason: HOSPADM

## 2023-07-03 RX ORDER — HEPARIN SODIUM (PORCINE) LOCK FLUSH IV SOLN 100 UNIT/ML 100 UNIT/ML
5 SOLUTION INTRAVENOUS
Status: CANCELLED | OUTPATIENT
Start: 2023-07-04

## 2023-07-03 RX ORDER — DIPHENHYDRAMINE HYDROCHLORIDE 50 MG/ML
50 INJECTION INTRAMUSCULAR; INTRAVENOUS
Status: CANCELLED
Start: 2023-07-04

## 2023-07-03 RX ADMIN — SODIUM CHLORIDE 1000 MG: 9 INJECTION, SOLUTION INTRAVENOUS at 08:27

## 2023-07-03 RX ADMIN — SODIUM CHLORIDE 250 ML: 9 INJECTION, SOLUTION INTRAVENOUS at 08:25

## 2023-07-10 ENCOUNTER — INFUSION THERAPY VISIT (OUTPATIENT)
Dept: INFUSION THERAPY | Facility: HOSPITAL | Age: 33
End: 2023-07-10
Attending: FAMILY MEDICINE
Payer: COMMERCIAL

## 2023-07-10 VITALS
RESPIRATION RATE: 18 BRPM | TEMPERATURE: 97.7 F | HEART RATE: 87 BPM | DIASTOLIC BLOOD PRESSURE: 58 MMHG | OXYGEN SATURATION: 97 % | SYSTOLIC BLOOD PRESSURE: 120 MMHG

## 2023-07-10 DIAGNOSIS — R20.3 HYPERESTHESIA: Primary | ICD-10-CM

## 2023-07-10 PROCEDURE — 258N000003 HC RX IP 258 OP 636: Performed by: PSYCHIATRY & NEUROLOGY

## 2023-07-10 PROCEDURE — 96366 THER/PROPH/DIAG IV INF ADDON: CPT

## 2023-07-10 PROCEDURE — 250N000011 HC RX IP 250 OP 636: Mod: JZ | Performed by: PSYCHIATRY & NEUROLOGY

## 2023-07-10 PROCEDURE — 96365 THER/PROPH/DIAG IV INF INIT: CPT

## 2023-07-10 RX ORDER — ALBUTEROL SULFATE 0.83 MG/ML
2.5 SOLUTION RESPIRATORY (INHALATION)
Status: DISCONTINUED | OUTPATIENT
Start: 2023-07-10 | End: 2023-07-10 | Stop reason: HOSPADM

## 2023-07-10 RX ORDER — DIPHENHYDRAMINE HYDROCHLORIDE 50 MG/ML
50 INJECTION INTRAMUSCULAR; INTRAVENOUS
Status: DISCONTINUED | OUTPATIENT
Start: 2023-07-10 | End: 2023-07-10 | Stop reason: HOSPADM

## 2023-07-10 RX ORDER — EPINEPHRINE 1 MG/ML
0.3 INJECTION, SOLUTION INTRAMUSCULAR; SUBCUTANEOUS EVERY 5 MIN PRN
Status: DISCONTINUED | OUTPATIENT
Start: 2023-07-10 | End: 2023-07-10 | Stop reason: HOSPADM

## 2023-07-10 RX ORDER — METHYLPREDNISOLONE SODIUM SUCCINATE 125 MG/2ML
125 INJECTION, POWDER, LYOPHILIZED, FOR SOLUTION INTRAMUSCULAR; INTRAVENOUS
Status: CANCELLED
Start: 2023-07-11

## 2023-07-10 RX ORDER — MEPERIDINE HYDROCHLORIDE 25 MG/ML
25 INJECTION INTRAMUSCULAR; INTRAVENOUS; SUBCUTANEOUS EVERY 30 MIN PRN
Status: CANCELLED | OUTPATIENT
Start: 2023-07-11

## 2023-07-10 RX ORDER — EPINEPHRINE 1 MG/ML
0.3 INJECTION, SOLUTION INTRAMUSCULAR; SUBCUTANEOUS EVERY 5 MIN PRN
Status: CANCELLED | OUTPATIENT
Start: 2023-07-11

## 2023-07-10 RX ORDER — ALBUTEROL SULFATE 0.83 MG/ML
2.5 SOLUTION RESPIRATORY (INHALATION)
Status: CANCELLED | OUTPATIENT
Start: 2023-07-11

## 2023-07-10 RX ORDER — HEPARIN SODIUM,PORCINE 10 UNIT/ML
5-20 VIAL (ML) INTRAVENOUS DAILY PRN
Status: CANCELLED | OUTPATIENT
Start: 2023-07-11

## 2023-07-10 RX ORDER — METHYLPREDNISOLONE SODIUM SUCCINATE 125 MG/2ML
125 INJECTION, POWDER, LYOPHILIZED, FOR SOLUTION INTRAMUSCULAR; INTRAVENOUS
Status: DISCONTINUED | OUTPATIENT
Start: 2023-07-10 | End: 2023-07-10 | Stop reason: HOSPADM

## 2023-07-10 RX ORDER — ALBUTEROL SULFATE 90 UG/1
1-2 AEROSOL, METERED RESPIRATORY (INHALATION)
Status: DISCONTINUED | OUTPATIENT
Start: 2023-07-10 | End: 2023-07-10 | Stop reason: HOSPADM

## 2023-07-10 RX ORDER — DIPHENHYDRAMINE HYDROCHLORIDE 50 MG/ML
50 INJECTION INTRAMUSCULAR; INTRAVENOUS
Status: CANCELLED
Start: 2023-07-11

## 2023-07-10 RX ORDER — MEPERIDINE HYDROCHLORIDE 25 MG/ML
25 INJECTION INTRAMUSCULAR; INTRAVENOUS; SUBCUTANEOUS EVERY 30 MIN PRN
Status: DISCONTINUED | OUTPATIENT
Start: 2023-07-10 | End: 2023-07-10 | Stop reason: HOSPADM

## 2023-07-10 RX ORDER — ALBUTEROL SULFATE 90 UG/1
1-2 AEROSOL, METERED RESPIRATORY (INHALATION)
Status: CANCELLED
Start: 2023-07-11

## 2023-07-10 RX ORDER — HEPARIN SODIUM (PORCINE) LOCK FLUSH IV SOLN 100 UNIT/ML 100 UNIT/ML
5 SOLUTION INTRAVENOUS
Status: CANCELLED | OUTPATIENT
Start: 2023-07-11

## 2023-07-10 RX ADMIN — SODIUM CHLORIDE 1000 MG: 9 INJECTION, SOLUTION INTRAVENOUS at 08:36

## 2023-07-10 RX ADMIN — SODIUM CHLORIDE 250 ML: 9 INJECTION, SOLUTION INTRAVENOUS at 08:35

## 2023-07-10 ASSESSMENT — PAIN SCALES - GENERAL: PAINLEVEL: NO PAIN (0)

## 2023-07-19 ENCOUNTER — INFUSION THERAPY VISIT (OUTPATIENT)
Dept: INFUSION THERAPY | Facility: HOSPITAL | Age: 33
End: 2023-07-19
Attending: FAMILY MEDICINE
Payer: COMMERCIAL

## 2023-07-19 VITALS
TEMPERATURE: 98.6 F | RESPIRATION RATE: 18 BRPM | DIASTOLIC BLOOD PRESSURE: 55 MMHG | HEART RATE: 91 BPM | OXYGEN SATURATION: 97 % | SYSTOLIC BLOOD PRESSURE: 102 MMHG

## 2023-07-19 DIAGNOSIS — R20.3 HYPERESTHESIA: Primary | ICD-10-CM

## 2023-07-19 PROCEDURE — 258N000003 HC RX IP 258 OP 636: Performed by: PSYCHIATRY & NEUROLOGY

## 2023-07-19 PROCEDURE — 250N000011 HC RX IP 250 OP 636: Mod: JZ | Performed by: PSYCHIATRY & NEUROLOGY

## 2023-07-19 PROCEDURE — 96365 THER/PROPH/DIAG IV INF INIT: CPT

## 2023-07-19 RX ORDER — MEPERIDINE HYDROCHLORIDE 25 MG/ML
25 INJECTION INTRAMUSCULAR; INTRAVENOUS; SUBCUTANEOUS EVERY 30 MIN PRN
Status: DISCONTINUED | OUTPATIENT
Start: 2023-07-19 | End: 2023-07-19 | Stop reason: HOSPADM

## 2023-07-19 RX ORDER — HEPARIN SODIUM (PORCINE) LOCK FLUSH IV SOLN 100 UNIT/ML 100 UNIT/ML
5 SOLUTION INTRAVENOUS
Status: CANCELLED | OUTPATIENT
Start: 2023-07-25

## 2023-07-19 RX ORDER — ALBUTEROL SULFATE 0.83 MG/ML
2.5 SOLUTION RESPIRATORY (INHALATION)
Status: CANCELLED | OUTPATIENT
Start: 2023-07-25

## 2023-07-19 RX ORDER — MEPERIDINE HYDROCHLORIDE 25 MG/ML
25 INJECTION INTRAMUSCULAR; INTRAVENOUS; SUBCUTANEOUS EVERY 30 MIN PRN
Status: CANCELLED | OUTPATIENT
Start: 2023-07-25

## 2023-07-19 RX ORDER — ALBUTEROL SULFATE 90 UG/1
1-2 AEROSOL, METERED RESPIRATORY (INHALATION)
Status: CANCELLED
Start: 2023-07-25

## 2023-07-19 RX ORDER — ALBUTEROL SULFATE 90 UG/1
1-2 AEROSOL, METERED RESPIRATORY (INHALATION)
Status: DISCONTINUED | OUTPATIENT
Start: 2023-07-19 | End: 2023-07-19 | Stop reason: HOSPADM

## 2023-07-19 RX ORDER — METHYLPREDNISOLONE SODIUM SUCCINATE 125 MG/2ML
125 INJECTION, POWDER, LYOPHILIZED, FOR SOLUTION INTRAMUSCULAR; INTRAVENOUS
Status: DISCONTINUED | OUTPATIENT
Start: 2023-07-19 | End: 2023-07-19 | Stop reason: HOSPADM

## 2023-07-19 RX ORDER — DIPHENHYDRAMINE HYDROCHLORIDE 50 MG/ML
50 INJECTION INTRAMUSCULAR; INTRAVENOUS
Status: CANCELLED
Start: 2023-07-25

## 2023-07-19 RX ORDER — HEPARIN SODIUM,PORCINE 10 UNIT/ML
5-20 VIAL (ML) INTRAVENOUS DAILY PRN
Status: CANCELLED | OUTPATIENT
Start: 2023-07-25

## 2023-07-19 RX ORDER — EPINEPHRINE 1 MG/ML
0.3 INJECTION, SOLUTION INTRAMUSCULAR; SUBCUTANEOUS EVERY 5 MIN PRN
Status: CANCELLED | OUTPATIENT
Start: 2023-07-25

## 2023-07-19 RX ORDER — EPINEPHRINE 1 MG/ML
0.3 INJECTION, SOLUTION INTRAMUSCULAR; SUBCUTANEOUS EVERY 5 MIN PRN
Status: DISCONTINUED | OUTPATIENT
Start: 2023-07-19 | End: 2023-07-19 | Stop reason: HOSPADM

## 2023-07-19 RX ORDER — ALBUTEROL SULFATE 0.83 MG/ML
2.5 SOLUTION RESPIRATORY (INHALATION)
Status: DISCONTINUED | OUTPATIENT
Start: 2023-07-19 | End: 2023-07-19 | Stop reason: HOSPADM

## 2023-07-19 RX ORDER — METHYLPREDNISOLONE SODIUM SUCCINATE 125 MG/2ML
125 INJECTION, POWDER, LYOPHILIZED, FOR SOLUTION INTRAMUSCULAR; INTRAVENOUS
Status: CANCELLED
Start: 2023-07-25

## 2023-07-19 RX ORDER — DIPHENHYDRAMINE HYDROCHLORIDE 50 MG/ML
50 INJECTION INTRAMUSCULAR; INTRAVENOUS
Status: DISCONTINUED | OUTPATIENT
Start: 2023-07-19 | End: 2023-07-19 | Stop reason: HOSPADM

## 2023-07-19 RX ADMIN — SODIUM CHLORIDE 1000 MG: 9 INJECTION, SOLUTION INTRAVENOUS at 10:01

## 2023-07-19 RX ADMIN — SODIUM CHLORIDE 250 ML: 9 INJECTION, SOLUTION INTRAVENOUS at 10:01

## 2023-07-19 NOTE — PROGRESS NOTES
Infusion Nursing Note:  Rose Ronquillo presents today for Methylprednisolone.    Patient seen by provider today: No   present during visit today: Not Applicable.    Note: Rose arrived ambulatory into the infusion center for Methylprednisolone infusion in a stable condition, VSS. Plan of care reviewed, she verbalized understanding of her plan of care and return to clinic. Rose has two more appts scheduled with infusion at this time      Intravenous Access:  Peripheral IV placed by Bill GRANT, after two failed attempts by writer    Treatment Conditions:  Not Applicable.    Post Infusion Assessment:  Patient tolerated infusion without incident.  Site patent and intact, free from redness, edema or discomfort.  No evidence of extravasations.  Access discontinued per protocol.     Discharge Plan:   Discharge instructions reviewed with: Patient.  Patient and/or family verbalized understanding of discharge instructions and all questions answered.  Patient discharged in stable condition accompanied by: self.  Departure Mode: Ambulatory.      JUANITA Chen

## 2023-07-21 ENCOUNTER — THERAPY VISIT (OUTPATIENT)
Dept: PHYSICAL THERAPY | Facility: CLINIC | Age: 33
End: 2023-07-21
Payer: COMMERCIAL

## 2023-07-21 DIAGNOSIS — M54.6 BILATERAL THORACIC BACK PAIN: Primary | ICD-10-CM

## 2023-07-21 PROCEDURE — 97140 MANUAL THERAPY 1/> REGIONS: CPT | Mod: GP | Performed by: PHYSICAL THERAPIST

## 2023-07-25 ENCOUNTER — OFFICE VISIT (OUTPATIENT)
Dept: NEUROLOGY | Facility: CLINIC | Age: 33
End: 2023-07-25
Payer: COMMERCIAL

## 2023-07-25 VITALS — DIASTOLIC BLOOD PRESSURE: 68 MMHG | HEART RATE: 87 BPM | SYSTOLIC BLOOD PRESSURE: 96 MMHG

## 2023-07-25 DIAGNOSIS — M35.9 AUTOIMMUNE DISEASE (H): Primary | ICD-10-CM

## 2023-07-25 DIAGNOSIS — R53.83 OTHER FATIGUE: ICD-10-CM

## 2023-07-25 DIAGNOSIS — R20.3 HYPERESTHESIA: ICD-10-CM

## 2023-07-25 PROCEDURE — 99214 OFFICE O/P EST MOD 30 MIN: CPT | Performed by: PSYCHIATRY & NEUROLOGY

## 2023-07-25 RX ORDER — DIPHENHYDRAMINE HYDROCHLORIDE 50 MG/ML
50 INJECTION INTRAMUSCULAR; INTRAVENOUS
Status: CANCELLED
Start: 2023-08-14

## 2023-07-25 RX ORDER — METHYLPREDNISOLONE SODIUM SUCCINATE 125 MG/2ML
125 INJECTION, POWDER, LYOPHILIZED, FOR SOLUTION INTRAMUSCULAR; INTRAVENOUS
Status: CANCELLED
Start: 2023-08-14

## 2023-07-25 RX ORDER — EPINEPHRINE 1 MG/ML
0.3 INJECTION, SOLUTION, CONCENTRATE INTRAVENOUS EVERY 5 MIN PRN
Status: CANCELLED | OUTPATIENT
Start: 2023-08-14

## 2023-07-25 RX ORDER — MEPERIDINE HYDROCHLORIDE 25 MG/ML
25 INJECTION INTRAMUSCULAR; INTRAVENOUS; SUBCUTANEOUS EVERY 30 MIN PRN
Status: CANCELLED | OUTPATIENT
Start: 2023-08-14

## 2023-07-25 RX ORDER — ALBUTEROL SULFATE 90 UG/1
1-2 AEROSOL, METERED RESPIRATORY (INHALATION)
Status: CANCELLED
Start: 2023-08-14

## 2023-07-25 RX ORDER — HEPARIN SODIUM (PORCINE) LOCK FLUSH IV SOLN 100 UNIT/ML 100 UNIT/ML
5 SOLUTION INTRAVENOUS
Status: CANCELLED | OUTPATIENT
Start: 2023-08-14

## 2023-07-25 RX ORDER — HEPARIN SODIUM,PORCINE 10 UNIT/ML
5-20 VIAL (ML) INTRAVENOUS DAILY PRN
Status: CANCELLED | OUTPATIENT
Start: 2023-08-14

## 2023-07-25 RX ORDER — ALBUTEROL SULFATE 0.83 MG/ML
2.5 SOLUTION RESPIRATORY (INHALATION)
Status: CANCELLED | OUTPATIENT
Start: 2023-08-14

## 2023-07-25 NOTE — PATIENT INSTRUCTIONS
Plan:  -- Let's finish up the last 2 weekly steroid infusions and then we will spread the dosing out to every other week for a couple of months.  -- Let me know how you are feeling in about 6 weeks so that we can adjust our plan accordingly.  -- Follow-up in clinic in 3 months.

## 2023-07-25 NOTE — LETTER
7/25/2023         RE: Rose Ronquillo  [address] [id number]        Dear Colleague,    Thank you for referring your patient, Rose Ronquillo, to the [facility] [place]. Please see a copy of my visit note below.    ESTABLISHED PATIENT NEUROLOGY NOTE    DATE OF VISIT: 7/25/2023  MRN: [id number]  PATIENT NAME: Rose Ronquillo  YOB: 1990    Chief Complaint   Patient presents with     Follow Up     3 mo follow-up   Infusion has been helping      SUBJECTIVE:                                                      HISTORY OF PRESENT ILLNESS:  Rose is here for follow up regarding weakness and paresthesias.  Extensive work-up has been unremarkable.  Please see my previous notes for additional details.  For the hyperesthesia and weakness I decided to start Shantel on weekly steroid infusions despite not having a clear autoimmune diagnosis.  She has undergone 4 weekly infusions of high-dose methylprednisolone.    She has reported losing the feelings in her legs on and off.  She has had problems with standing, noted to have [place] maneuver and postural instability as well as hyperreflexia on prior exams.    Rose is here with her father.  She says that the steroid treatments have been a miracle.  Says she feels 75-80% better.  She is consistently having more feeling in her legs, is stronger and can do more from a balance standpoint.  She says she is able now to squat down and play with the dog and get up without difficulty.  She does still undergo dry needling through a friend and has noticed she has developed more and more sensation in the thighs where the treatments are completed.  Still has a little trouble with dexterity in the hands.  She does not necessarily notice wearing off of the improvement between doses of the steroids.  She has developed some bruising in her forearm where the IVs have been placed but otherwise no problems in terms of undergoing the actual  infusions.  I have her on modafinil for fatigue.    They remind me that her father also underwent steroid and IVIG treatments in the past.    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  She is looking into getting a job now.  She asks about whether she can get into doing some exercise.  She plans to do Pilates to start.    CURRENT MEDICATIONS:   amLODIPine (NORVASC) 2.5 MG tablet, TAKE 1 TABLET(2.5 MG) BY MOUTH DAILY  Calcium Carbonate Antacid (TUMS PO), Take  by mouth.  Cholecalciferol (VITAMIN D PO),   fish oil-omega-3 fatty acids (OMEGA-3 FISH OIL) 1000 MG capsule,   gabapentin (NEURONTIN) 100 MG capsule, Take 100 mg by mouth Takes 400mg in the morning  gabapentin (NEURONTIN) 300 MG capsule, Take 300 mg by mouth  ibuprofen (ADVIL,MOTRIN) 200 MG tablet, Take 200 mg by mouth every 4 hours as needed  Ipratropium-Albuterol (COMBIVENT RESPIMAT)  MCG/ACT inhaler, Inhale 1 puff into the lungs 4 times daily Not to exceed 6 doses per day.  modafinil (PROVIGIL) 100 MG tablet, Take 1 tablet (100 mg) by mouth daily  Multiple Vitamin (MULTI-VITAMIN PO),   nitroGLYcerin (NITRO-BID) 2 % OINT ointment, Place 1 inch (15 mg) onto the skin every 24 hours Apply to affected area ( toes or fingers)  at night and avoid touching eyes or face .  sertraline (ZOLOFT) 25 MG tablet, Take 1 tablet by mouth daily at 2 pm  VYVANSE 50 MG OR CAPS, 1 CAPSULE DAILY  ZOLOFT 50 MG OR TABS, I tablet daily    No current facility-administered medications on file prior to visit.      RECENT DIAGNOSTIC STUDIES:   Labs: No results found for any visits on 07/25/23.      REVIEW OF SYSTEMS:                                                      10-point review of systems is negative except as mentioned above in HPI.    EXAM:                                                      Physical Exam:   Vitals: BP 96/68   Pulse 87   BMI= There is no height or weight on file to calculate BMI.  GENERAL: NAD.  HEENT: NC/AT.  CV: RRR. S1,  S2.   NECK: No bruits.  PULM: Non-labored breathing.   Neurologic:  MENTAL STATUS: Alert, attentive. Speech is fluent. Normal comprehension. Normal concentration. Adequate fund of knowledge.   CRANIAL NERVES: Discs flat. Visual fields intact to confrontation. Pupils equally, round and reactive to light. Facial sensation and movement normal. EOM full. Hearing intact to conversation. Trapezius strength intact. Palate moves symmetrically. Tongue midline.  MOTOR: Strength is full in the deltoids, biceps, triceps and hands.  Able to stand without using her hands and squat to stand with no difficulty.  Strength in the legs appears to be full on today's exam, quite improved.  Tone and bulk normal.   DTRs: Intact and symmetric in biceps, BR, patellae.  2+/2 in the upper extremities, slightly brisk at the knees. No crossed adductor response. Slightly brisk at ankles.  Clonus on the left.  - This is an improvement.   SENSATION: Normal light touch throughout. Vibration: Normal at both ankles.   COORDINATION: Normal finger nose finger. Finger tapping normal. Knee heel shin normal.  STATION AND GAIT: Romberg is negative. Good postural reflexes. Casual gait is normal.   Right hand-dominant.     ASSESSMENT and PLAN:                                                      Assessment:    ICD-10-CM    1. Autoimmune disease (H)  M35.9     unspecified      2. Hyperesthesia  R20.3       3. Other fatigue  R53.83           Ms. Ronquillo is a pleasant 33-year-old woman here for follow-up regarding relatively acute onset weakness and paresthesias of unclear etiology.  She has noticed about 80% improvement of her symptoms since starting the steroid infusions.  We talked about finishing up a full 6 weeks of weekly infusions and then try spreading them out to every other week for a couple of months to see how she does.  We also discussed other potential treatment options to avoid long-term steroid use going forward, such as IVIG.  We will see how she  progresses.  We will continue the modafinil for now as well.  Rose understands and agrees with the plan.    Plan:  -- Let's finish up the last 2 weekly steroid infusions and then we will spread the dosing out to every other week for a couple of months.  -- Let me know how you are feeling in about 6 weeks so that we can adjust our plan accordingly.  -- Follow-up in clinic in 3 months.    Total Time: 35 minutes were spent with the patient and in chart review/documentation (as described above in Assessment and Plan)/coordinating the care on date of service.    Yolande Colvin MD  Neurology    Dragon software used in the dictation of this note.                    Again, thank you for allowing me to participate in the care of your patient.        Sincerely,        Yolande Colvin MD

## 2023-07-25 NOTE — NURSING NOTE
"Rose Ronquillo is a 33 year old female who presents for:  Chief Complaint   Patient presents with    Follow Up     3 mo follow-up   Infusion has been helping         Initial Vitals:  BP 96/68   Pulse 87  Estimated body mass index is 20.07 kg/m  as calculated from the following:    Height as of 4/19/23: 1.727 m (5' 8\").    Weight as of 5/8/23: 59.9 kg (132 lb).. There is no height or weight on file to calculate BSA. BP completed using cuff size: wrist cuff    Jeronimo VWes Emir  "

## 2023-07-25 NOTE — PROGRESS NOTES
ESTABLISHED PATIENT NEUROLOGY NOTE    DATE OF VISIT: 7/25/2023  MRN: [id number]  PATIENT NAME: Rose Ronquillo  YOB: 1990    Chief Complaint   Patient presents with    Follow Up     3 mo follow-up   Infusion has been helping      SUBJECTIVE:                                                      HISTORY OF PRESENT ILLNESS:  Rose is here for follow up regarding weakness and paresthesias.  Extensive work-up has been unremarkable.  Please see my previous notes for additional details.  For the hyperesthesia and weakness I decided to start Shantel on weekly steroid infusions despite not having a clear autoimmune diagnosis.  She has undergone 4 weekly infusions of high-dose methylprednisolone.    She has reported losing the feelings in her legs on and off.  She has had problems with standing, noted to have [place] maneuver and postural instability as well as hyperreflexia on prior exams.    Rose is here with her father.  She says that the steroid treatments have been a miracle.  Says she feels 75-80% better.  She is consistently having more feeling in her legs, is stronger and can do more from a balance standpoint.  She says she is able now to squat down and play with the dog and get up without difficulty.  She does still undergo dry needling through a friend and has noticed she has developed more and more sensation in the thighs where the treatments are completed.  Still has a little trouble with dexterity in the hands.  She does not necessarily notice wearing off of the improvement between doses of the steroids.  She has developed some bruising in her forearm where the IVs have been placed but otherwise no problems in terms of undergoing the actual infusions.  I have her on modafinil for fatigue.    They remind me that her father also underwent steroid and IVIG treatments in the past.    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  She is looking into getting a job now.  She asks  about whether she can get into doing some exercise.  She plans to do Pilates to start.    CURRENT MEDICATIONS:   amLODIPine (NORVASC) 2.5 MG tablet, TAKE 1 TABLET(2.5 MG) BY MOUTH DAILY  Calcium Carbonate Antacid (TUMS PO), Take  by mouth.  Cholecalciferol (VITAMIN D PO),   fish oil-omega-3 fatty acids (OMEGA-3 FISH OIL) 1000 MG capsule,   gabapentin (NEURONTIN) 100 MG capsule, Take 100 mg by mouth Takes 400mg in the morning  gabapentin (NEURONTIN) 300 MG capsule, Take 300 mg by mouth  ibuprofen (ADVIL,MOTRIN) 200 MG tablet, Take 200 mg by mouth every 4 hours as needed  Ipratropium-Albuterol (COMBIVENT RESPIMAT)  MCG/ACT inhaler, Inhale 1 puff into the lungs 4 times daily Not to exceed 6 doses per day.  modafinil (PROVIGIL) 100 MG tablet, Take 1 tablet (100 mg) by mouth daily  Multiple Vitamin (MULTI-VITAMIN PO),   nitroGLYcerin (NITRO-BID) 2 % OINT ointment, Place 1 inch (15 mg) onto the skin every 24 hours Apply to affected area ( toes or fingers)  at night and avoid touching eyes or face .  sertraline (ZOLOFT) 25 MG tablet, Take 1 tablet by mouth daily at 2 pm  VYVANSE 50 MG OR CAPS, 1 CAPSULE DAILY  ZOLOFT 50 MG OR TABS, I tablet daily    No current facility-administered medications on file prior to visit.      RECENT DIAGNOSTIC STUDIES:   Labs: No results found for any visits on 07/25/23.      REVIEW OF SYSTEMS:                                                      10-point review of systems is negative except as mentioned above in HPI.    EXAM:                                                      Physical Exam:   Vitals: BP 96/68   Pulse 87   BMI= There is no height or weight on file to calculate BMI.  GENERAL: NAD.  HEENT: NC/AT.  CV: RRR. S1, S2.   NECK: No bruits.  PULM: Non-labored breathing.   Neurologic:  MENTAL STATUS: Alert, attentive. Speech is fluent. Normal comprehension. Normal concentration. Adequate fund of knowledge.   CRANIAL NERVES: Discs flat. Visual fields intact to confrontation.  Pupils equally, round and reactive to light. Facial sensation and movement normal. EOM full. Hearing intact to conversation. Trapezius strength intact. Palate moves symmetrically. Tongue midline.  MOTOR: Strength is full in the deltoids, biceps, triceps and hands.  Able to stand without using her hands and squat to stand with no difficulty.  Strength in the legs appears to be full on today's exam, quite improved.  Tone and bulk normal.   DTRs: Intact and symmetric in biceps, BR, patellae.  2+/2 in the upper extremities, slightly brisk at the knees. No crossed adductor response. Slightly brisk at ankles.  Clonus on the left.  - This is an improvement.   SENSATION: Normal light touch throughout. Vibration: Normal at both ankles.   COORDINATION: Normal finger nose finger. Finger tapping normal. Knee heel shin normal.  STATION AND GAIT: Romberg is negative. Good postural reflexes. Casual gait is normal.   Right hand-dominant.     ASSESSMENT and PLAN:                                                      Assessment:    ICD-10-CM    1. Autoimmune disease (H)  M35.9     unspecified      2. Hyperesthesia  R20.3       3. Other fatigue  R53.83           Ms. Ronquillo is a pleasant 33-year-old woman here for follow-up regarding relatively acute onset weakness and paresthesias of unclear etiology.  She has noticed about 80% improvement of her symptoms since starting the steroid infusions.  We talked about finishing up a full 6 weeks of weekly infusions and then try spreading them out to every other week for a couple of months to see how she does.  We also discussed other potential treatment options to avoid long-term steroid use going forward, such as IVIG.  We will see how she progresses.  We will continue the modafinil for now as well.  Rose understands and agrees with the plan.    Plan:  -- Let's finish up the last 2 weekly steroid infusions and then we will spread the dosing out to every other week for a couple of months.  --  Let me know how you are feeling in about 6 weeks so that we can adjust our plan accordingly.  -- Follow-up in clinic in 3 months.    Total Time: 35 minutes were spent with the patient and in chart review/documentation (as described above in Assessment and Plan)/coordinating the care on date of service.    Yolande Colvin MD  Neurology    Dragon software used in the dictation of this note.

## 2023-07-27 ENCOUNTER — INFUSION THERAPY VISIT (OUTPATIENT)
Dept: INFUSION THERAPY | Facility: HOSPITAL | Age: 33
End: 2023-07-27
Attending: FAMILY MEDICINE
Payer: COMMERCIAL

## 2023-07-27 VITALS
SYSTOLIC BLOOD PRESSURE: 101 MMHG | TEMPERATURE: 97.6 F | RESPIRATION RATE: 18 BRPM | HEART RATE: 78 BPM | OXYGEN SATURATION: 99 % | DIASTOLIC BLOOD PRESSURE: 62 MMHG

## 2023-07-27 DIAGNOSIS — R20.3 HYPERESTHESIA: Primary | ICD-10-CM

## 2023-07-27 PROCEDURE — 258N000003 HC RX IP 258 OP 636: Performed by: PSYCHIATRY & NEUROLOGY

## 2023-07-27 PROCEDURE — 250N000011 HC RX IP 250 OP 636: Mod: JZ | Performed by: PSYCHIATRY & NEUROLOGY

## 2023-07-27 PROCEDURE — 96365 THER/PROPH/DIAG IV INF INIT: CPT

## 2023-07-27 RX ORDER — HEPARIN SODIUM (PORCINE) LOCK FLUSH IV SOLN 100 UNIT/ML 100 UNIT/ML
5 SOLUTION INTRAVENOUS
Status: CANCELLED | OUTPATIENT
Start: 2023-08-01

## 2023-07-27 RX ORDER — ALBUTEROL SULFATE 0.83 MG/ML
2.5 SOLUTION RESPIRATORY (INHALATION)
Status: DISCONTINUED | OUTPATIENT
Start: 2023-07-27 | End: 2023-07-27 | Stop reason: HOSPADM

## 2023-07-27 RX ORDER — MEPERIDINE HYDROCHLORIDE 25 MG/ML
25 INJECTION INTRAMUSCULAR; INTRAVENOUS; SUBCUTANEOUS EVERY 30 MIN PRN
Status: CANCELLED | OUTPATIENT
Start: 2023-08-10

## 2023-07-27 RX ORDER — DIPHENHYDRAMINE HYDROCHLORIDE 50 MG/ML
50 INJECTION INTRAMUSCULAR; INTRAVENOUS
Status: DISCONTINUED | OUTPATIENT
Start: 2023-07-27 | End: 2023-07-27 | Stop reason: HOSPADM

## 2023-07-27 RX ORDER — ALBUTEROL SULFATE 0.83 MG/ML
2.5 SOLUTION RESPIRATORY (INHALATION)
Status: CANCELLED | OUTPATIENT
Start: 2023-08-10

## 2023-07-27 RX ORDER — MEPERIDINE HYDROCHLORIDE 25 MG/ML
25 INJECTION INTRAMUSCULAR; INTRAVENOUS; SUBCUTANEOUS EVERY 30 MIN PRN
Status: DISCONTINUED | OUTPATIENT
Start: 2023-07-27 | End: 2023-07-27 | Stop reason: HOSPADM

## 2023-07-27 RX ORDER — ALBUTEROL SULFATE 90 UG/1
1-2 AEROSOL, METERED RESPIRATORY (INHALATION)
Status: CANCELLED
Start: 2023-08-01

## 2023-07-27 RX ORDER — ALBUTEROL SULFATE 90 UG/1
1-2 AEROSOL, METERED RESPIRATORY (INHALATION)
Status: CANCELLED
Start: 2023-08-10

## 2023-07-27 RX ORDER — DIPHENHYDRAMINE HYDROCHLORIDE 50 MG/ML
50 INJECTION INTRAMUSCULAR; INTRAVENOUS
Status: CANCELLED
Start: 2023-08-10

## 2023-07-27 RX ORDER — DIPHENHYDRAMINE HYDROCHLORIDE 50 MG/ML
50 INJECTION INTRAMUSCULAR; INTRAVENOUS
Status: CANCELLED
Start: 2023-08-01

## 2023-07-27 RX ORDER — ALBUTEROL SULFATE 0.83 MG/ML
2.5 SOLUTION RESPIRATORY (INHALATION)
Status: CANCELLED | OUTPATIENT
Start: 2023-08-01

## 2023-07-27 RX ORDER — EPINEPHRINE 1 MG/ML
0.3 INJECTION, SOLUTION INTRAMUSCULAR; SUBCUTANEOUS EVERY 5 MIN PRN
Status: DISCONTINUED | OUTPATIENT
Start: 2023-07-27 | End: 2023-07-27 | Stop reason: HOSPADM

## 2023-07-27 RX ORDER — MEPERIDINE HYDROCHLORIDE 25 MG/ML
25 INJECTION INTRAMUSCULAR; INTRAVENOUS; SUBCUTANEOUS EVERY 30 MIN PRN
Status: CANCELLED | OUTPATIENT
Start: 2023-08-01

## 2023-07-27 RX ORDER — HEPARIN SODIUM (PORCINE) LOCK FLUSH IV SOLN 100 UNIT/ML 100 UNIT/ML
5 SOLUTION INTRAVENOUS
Status: CANCELLED | OUTPATIENT
Start: 2023-08-10

## 2023-07-27 RX ORDER — METHYLPREDNISOLONE SODIUM SUCCINATE 125 MG/2ML
125 INJECTION, POWDER, LYOPHILIZED, FOR SOLUTION INTRAMUSCULAR; INTRAVENOUS
Status: DISCONTINUED | OUTPATIENT
Start: 2023-07-27 | End: 2023-07-27 | Stop reason: HOSPADM

## 2023-07-27 RX ORDER — EPINEPHRINE 1 MG/ML
0.3 INJECTION, SOLUTION INTRAMUSCULAR; SUBCUTANEOUS EVERY 5 MIN PRN
Status: CANCELLED | OUTPATIENT
Start: 2023-08-01

## 2023-07-27 RX ORDER — HEPARIN SODIUM,PORCINE 10 UNIT/ML
5-20 VIAL (ML) INTRAVENOUS DAILY PRN
Status: CANCELLED | OUTPATIENT
Start: 2023-08-01

## 2023-07-27 RX ORDER — ALBUTEROL SULFATE 90 UG/1
1-2 AEROSOL, METERED RESPIRATORY (INHALATION)
Status: DISCONTINUED | OUTPATIENT
Start: 2023-07-27 | End: 2023-07-27 | Stop reason: HOSPADM

## 2023-07-27 RX ORDER — HEPARIN SODIUM,PORCINE 10 UNIT/ML
5-20 VIAL (ML) INTRAVENOUS DAILY PRN
Status: CANCELLED | OUTPATIENT
Start: 2023-08-10

## 2023-07-27 RX ORDER — METHYLPREDNISOLONE SODIUM SUCCINATE 125 MG/2ML
125 INJECTION, POWDER, LYOPHILIZED, FOR SOLUTION INTRAMUSCULAR; INTRAVENOUS
Status: CANCELLED
Start: 2023-08-01

## 2023-07-27 RX ORDER — EPINEPHRINE 1 MG/ML
0.3 INJECTION, SOLUTION INTRAMUSCULAR; SUBCUTANEOUS EVERY 5 MIN PRN
Status: CANCELLED | OUTPATIENT
Start: 2023-08-10

## 2023-07-27 RX ORDER — METHYLPREDNISOLONE SODIUM SUCCINATE 125 MG/2ML
125 INJECTION, POWDER, LYOPHILIZED, FOR SOLUTION INTRAMUSCULAR; INTRAVENOUS
Status: CANCELLED
Start: 2023-08-10

## 2023-07-27 RX ADMIN — SODIUM CHLORIDE 1000 MG: 9 INJECTION, SOLUTION INTRAVENOUS at 09:52

## 2023-07-27 NOTE — PROGRESS NOTES
Infusion Nursing Note:  Rose Ronquillo presents today for Methylprednisolone.    Patient seen by provider today: No   present during visit today: Not Applicable.    Note: Rose arrived ambulatory into the infusion center for Methylprednisolone infusion in a stable condition, VSS. Plan of care reviewed, she verbalized understanding of her plan of care and return to clinic. Rose has one more weekly appt, then her plan changes to every other week      Intravenous Access:  Peripheral IV R FA    Treatment Conditions:  Not Applicable.    Post Infusion Assessment:  Patient tolerated infusion without incident.  Site patent and intact, free from redness, edema or discomfort.  No evidence of extravasations.  Access discontinued per protocol.     Discharge Plan:   Discharge instructions reviewed with: Patient.  Patient and/or family verbalized understanding of discharge instructions and all questions answered.  Patient discharged in stable condition accompanied by: self.  Departure Mode: Ambulatory.      JUANITA Chen

## 2023-07-28 ENCOUNTER — THERAPY VISIT (OUTPATIENT)
Dept: PHYSICAL THERAPY | Facility: CLINIC | Age: 33
End: 2023-07-28
Payer: COMMERCIAL

## 2023-07-28 DIAGNOSIS — M54.6 BILATERAL THORACIC BACK PAIN: Primary | ICD-10-CM

## 2023-07-28 PROCEDURE — 97140 MANUAL THERAPY 1/> REGIONS: CPT | Mod: GP | Performed by: PHYSICAL THERAPIST

## 2023-07-31 ENCOUNTER — INFUSION THERAPY VISIT (OUTPATIENT)
Dept: INFUSION THERAPY | Facility: HOSPITAL | Age: 33
End: 2023-07-31
Attending: FAMILY MEDICINE
Payer: COMMERCIAL

## 2023-07-31 VITALS
OXYGEN SATURATION: 97 % | RESPIRATION RATE: 20 BRPM | DIASTOLIC BLOOD PRESSURE: 57 MMHG | TEMPERATURE: 97.9 F | HEART RATE: 91 BPM | SYSTOLIC BLOOD PRESSURE: 93 MMHG

## 2023-07-31 DIAGNOSIS — R20.3 HYPERESTHESIA: Primary | ICD-10-CM

## 2023-07-31 PROCEDURE — 250N000011 HC RX IP 250 OP 636: Mod: JZ | Performed by: PSYCHIATRY & NEUROLOGY

## 2023-07-31 PROCEDURE — 258N000003 HC RX IP 258 OP 636: Performed by: PSYCHIATRY & NEUROLOGY

## 2023-07-31 PROCEDURE — 96365 THER/PROPH/DIAG IV INF INIT: CPT

## 2023-07-31 RX ORDER — ALBUTEROL SULFATE 0.83 MG/ML
2.5 SOLUTION RESPIRATORY (INHALATION)
Status: CANCELLED | OUTPATIENT
Start: 2023-08-01

## 2023-07-31 RX ORDER — ALBUTEROL SULFATE 90 UG/1
1-2 AEROSOL, METERED RESPIRATORY (INHALATION)
Status: CANCELLED
Start: 2023-08-01

## 2023-07-31 RX ORDER — DIPHENHYDRAMINE HYDROCHLORIDE 50 MG/ML
50 INJECTION INTRAMUSCULAR; INTRAVENOUS
Status: CANCELLED
Start: 2023-08-01

## 2023-07-31 RX ORDER — HEPARIN SODIUM,PORCINE 10 UNIT/ML
5-20 VIAL (ML) INTRAVENOUS DAILY PRN
Status: CANCELLED | OUTPATIENT
Start: 2023-08-01

## 2023-07-31 RX ORDER — DIPHENHYDRAMINE HYDROCHLORIDE 50 MG/ML
50 INJECTION INTRAMUSCULAR; INTRAVENOUS
Status: DISCONTINUED | OUTPATIENT
Start: 2023-07-31 | End: 2023-07-31 | Stop reason: HOSPADM

## 2023-07-31 RX ORDER — ALBUTEROL SULFATE 90 UG/1
1-2 AEROSOL, METERED RESPIRATORY (INHALATION)
Status: DISCONTINUED | OUTPATIENT
Start: 2023-07-31 | End: 2023-07-31 | Stop reason: HOSPADM

## 2023-07-31 RX ORDER — ALBUTEROL SULFATE 0.83 MG/ML
2.5 SOLUTION RESPIRATORY (INHALATION)
Status: DISCONTINUED | OUTPATIENT
Start: 2023-07-31 | End: 2023-07-31 | Stop reason: HOSPADM

## 2023-07-31 RX ORDER — MEPERIDINE HYDROCHLORIDE 25 MG/ML
25 INJECTION INTRAMUSCULAR; INTRAVENOUS; SUBCUTANEOUS EVERY 30 MIN PRN
Status: DISCONTINUED | OUTPATIENT
Start: 2023-07-31 | End: 2023-07-31 | Stop reason: HOSPADM

## 2023-07-31 RX ORDER — EPINEPHRINE 1 MG/ML
0.3 INJECTION, SOLUTION INTRAMUSCULAR; SUBCUTANEOUS EVERY 5 MIN PRN
Status: DISCONTINUED | OUTPATIENT
Start: 2023-07-31 | End: 2023-07-31 | Stop reason: HOSPADM

## 2023-07-31 RX ORDER — MEPERIDINE HYDROCHLORIDE 25 MG/ML
25 INJECTION INTRAMUSCULAR; INTRAVENOUS; SUBCUTANEOUS EVERY 30 MIN PRN
Status: CANCELLED | OUTPATIENT
Start: 2023-08-01

## 2023-07-31 RX ORDER — METHYLPREDNISOLONE SODIUM SUCCINATE 125 MG/2ML
125 INJECTION, POWDER, LYOPHILIZED, FOR SOLUTION INTRAMUSCULAR; INTRAVENOUS
Status: CANCELLED
Start: 2023-08-01

## 2023-07-31 RX ORDER — EPINEPHRINE 1 MG/ML
0.3 INJECTION, SOLUTION INTRAMUSCULAR; SUBCUTANEOUS EVERY 5 MIN PRN
Status: CANCELLED | OUTPATIENT
Start: 2023-08-01

## 2023-07-31 RX ORDER — HEPARIN SODIUM (PORCINE) LOCK FLUSH IV SOLN 100 UNIT/ML 100 UNIT/ML
5 SOLUTION INTRAVENOUS
Status: CANCELLED | OUTPATIENT
Start: 2023-08-01

## 2023-07-31 RX ORDER — METHYLPREDNISOLONE SODIUM SUCCINATE 125 MG/2ML
125 INJECTION, POWDER, LYOPHILIZED, FOR SOLUTION INTRAMUSCULAR; INTRAVENOUS
Status: DISCONTINUED | OUTPATIENT
Start: 2023-07-31 | End: 2023-07-31 | Stop reason: HOSPADM

## 2023-07-31 RX ADMIN — SODIUM CHLORIDE 250 ML: 9 INJECTION, SOLUTION INTRAVENOUS at 09:18

## 2023-07-31 RX ADMIN — SODIUM CHLORIDE 1000 MG: 9 INJECTION, SOLUTION INTRAVENOUS at 09:18

## 2023-07-31 ASSESSMENT — PAIN SCALES - GENERAL: PAINLEVEL: NO PAIN (0)

## 2023-08-04 ENCOUNTER — THERAPY VISIT (OUTPATIENT)
Dept: PHYSICAL THERAPY | Facility: CLINIC | Age: 33
End: 2023-08-04
Payer: COMMERCIAL

## 2023-08-04 DIAGNOSIS — M54.6 BILATERAL THORACIC BACK PAIN: Primary | ICD-10-CM

## 2023-08-04 PROCEDURE — 97140 MANUAL THERAPY 1/> REGIONS: CPT | Mod: GP | Performed by: PHYSICAL THERAPIST

## 2023-08-14 ENCOUNTER — INFUSION THERAPY VISIT (OUTPATIENT)
Dept: INFUSION THERAPY | Facility: HOSPITAL | Age: 33
End: 2023-08-14
Attending: PSYCHIATRY & NEUROLOGY
Payer: COMMERCIAL

## 2023-08-14 VITALS
DIASTOLIC BLOOD PRESSURE: 58 MMHG | RESPIRATION RATE: 20 BRPM | HEART RATE: 85 BPM | OXYGEN SATURATION: 98 % | SYSTOLIC BLOOD PRESSURE: 117 MMHG | TEMPERATURE: 98 F

## 2023-08-14 DIAGNOSIS — R20.3 HYPERESTHESIA: Primary | ICD-10-CM

## 2023-08-14 PROCEDURE — 258N000003 HC RX IP 258 OP 636: Performed by: PSYCHIATRY & NEUROLOGY

## 2023-08-14 PROCEDURE — 250N000011 HC RX IP 250 OP 636: Performed by: PSYCHIATRY & NEUROLOGY

## 2023-08-14 PROCEDURE — 96365 THER/PROPH/DIAG IV INF INIT: CPT

## 2023-08-14 RX ORDER — DIPHENHYDRAMINE HYDROCHLORIDE 50 MG/ML
50 INJECTION INTRAMUSCULAR; INTRAVENOUS
Status: CANCELLED
Start: 2023-08-15

## 2023-08-14 RX ORDER — EPINEPHRINE 1 MG/ML
0.3 INJECTION, SOLUTION INTRAMUSCULAR; SUBCUTANEOUS EVERY 5 MIN PRN
Status: DISCONTINUED | OUTPATIENT
Start: 2023-08-14 | End: 2023-08-14 | Stop reason: HOSPADM

## 2023-08-14 RX ORDER — ALBUTEROL SULFATE 0.83 MG/ML
2.5 SOLUTION RESPIRATORY (INHALATION)
Status: CANCELLED | OUTPATIENT
Start: 2023-08-15

## 2023-08-14 RX ORDER — EPINEPHRINE 1 MG/ML
0.3 INJECTION, SOLUTION INTRAMUSCULAR; SUBCUTANEOUS EVERY 5 MIN PRN
Status: CANCELLED | OUTPATIENT
Start: 2023-08-15

## 2023-08-14 RX ORDER — HEPARIN SODIUM,PORCINE 10 UNIT/ML
5-20 VIAL (ML) INTRAVENOUS DAILY PRN
Status: CANCELLED | OUTPATIENT
Start: 2023-08-15

## 2023-08-14 RX ORDER — METHYLPREDNISOLONE SODIUM SUCCINATE 125 MG/2ML
125 INJECTION, POWDER, LYOPHILIZED, FOR SOLUTION INTRAMUSCULAR; INTRAVENOUS
Status: DISCONTINUED | OUTPATIENT
Start: 2023-08-14 | End: 2023-08-14 | Stop reason: HOSPADM

## 2023-08-14 RX ORDER — ALBUTEROL SULFATE 90 UG/1
1-2 AEROSOL, METERED RESPIRATORY (INHALATION)
Status: DISCONTINUED | OUTPATIENT
Start: 2023-08-14 | End: 2023-08-14 | Stop reason: HOSPADM

## 2023-08-14 RX ORDER — METHYLPREDNISOLONE SODIUM SUCCINATE 125 MG/2ML
125 INJECTION, POWDER, LYOPHILIZED, FOR SOLUTION INTRAMUSCULAR; INTRAVENOUS
Status: CANCELLED
Start: 2023-08-15

## 2023-08-14 RX ORDER — HEPARIN SODIUM (PORCINE) LOCK FLUSH IV SOLN 100 UNIT/ML 100 UNIT/ML
5 SOLUTION INTRAVENOUS
Status: CANCELLED | OUTPATIENT
Start: 2023-08-15

## 2023-08-14 RX ORDER — ALBUTEROL SULFATE 0.83 MG/ML
2.5 SOLUTION RESPIRATORY (INHALATION)
Status: DISCONTINUED | OUTPATIENT
Start: 2023-08-14 | End: 2023-08-14 | Stop reason: HOSPADM

## 2023-08-14 RX ORDER — MEPERIDINE HYDROCHLORIDE 25 MG/ML
25 INJECTION INTRAMUSCULAR; INTRAVENOUS; SUBCUTANEOUS EVERY 30 MIN PRN
Status: DISCONTINUED | OUTPATIENT
Start: 2023-08-14 | End: 2023-08-14 | Stop reason: HOSPADM

## 2023-08-14 RX ORDER — ALBUTEROL SULFATE 90 UG/1
1-2 AEROSOL, METERED RESPIRATORY (INHALATION)
Status: CANCELLED
Start: 2023-08-15

## 2023-08-14 RX ORDER — MEPERIDINE HYDROCHLORIDE 25 MG/ML
25 INJECTION INTRAMUSCULAR; INTRAVENOUS; SUBCUTANEOUS EVERY 30 MIN PRN
Status: CANCELLED | OUTPATIENT
Start: 2023-08-15

## 2023-08-14 RX ORDER — DIPHENHYDRAMINE HYDROCHLORIDE 50 MG/ML
50 INJECTION INTRAMUSCULAR; INTRAVENOUS
Status: DISCONTINUED | OUTPATIENT
Start: 2023-08-14 | End: 2023-08-14 | Stop reason: HOSPADM

## 2023-08-14 RX ADMIN — SODIUM CHLORIDE 1000 MG: 9 INJECTION, SOLUTION INTRAVENOUS at 08:44

## 2023-08-14 RX ADMIN — SODIUM CHLORIDE 250 ML: 9 INJECTION, SOLUTION INTRAVENOUS at 08:43

## 2023-08-14 ASSESSMENT — PAIN SCALES - GENERAL: PAINLEVEL: NO PAIN (0)

## 2023-08-14 NOTE — PROGRESS NOTES
Infusion Nursing Note:  Rose Ronquillo presents today for Methylprednisolone.    Patient seen by provider today: No   present during visit today: Not Applicable.    Note: Rose arrived ambulatory into the infusion center for Methylprednisolone infusion in a stable condition, VSS. Plan of care reviewed, she verbalized understanding of her plan of care and return to clinic. She complained of slight stiffness today but loosened up after she started walking    Intravenous Access:  Peripheral IV placed.    Treatment Conditions:  Not Applicable.    Post Infusion Assessment:  Patient tolerated infusion without incident.  Site patent and intact, free from redness, edema or discomfort.  No evidence of extravasations.  Access discontinued per protocol.     Discharge Plan:   Discharge instructions reviewed with: Patient.  Patient and/or family verbalized understanding of discharge instructions and all questions answered.  Patient discharged in stable condition accompanied by: self.  Departure Mode: Ambulatory.    Gabriella Dang RN

## 2023-08-18 ENCOUNTER — THERAPY VISIT (OUTPATIENT)
Dept: PHYSICAL THERAPY | Facility: CLINIC | Age: 33
End: 2023-08-18
Payer: COMMERCIAL

## 2023-08-18 DIAGNOSIS — M54.6 BILATERAL THORACIC BACK PAIN: Primary | ICD-10-CM

## 2023-08-18 PROCEDURE — 97140 MANUAL THERAPY 1/> REGIONS: CPT | Mod: GP | Performed by: PHYSICAL THERAPIST

## 2023-08-28 ENCOUNTER — INFUSION THERAPY VISIT (OUTPATIENT)
Dept: INFUSION THERAPY | Facility: HOSPITAL | Age: 33
End: 2023-08-28
Attending: PSYCHIATRY & NEUROLOGY
Payer: COMMERCIAL

## 2023-08-28 VITALS
DIASTOLIC BLOOD PRESSURE: 56 MMHG | TEMPERATURE: 98.4 F | HEART RATE: 69 BPM | OXYGEN SATURATION: 96 % | RESPIRATION RATE: 18 BRPM | SYSTOLIC BLOOD PRESSURE: 101 MMHG

## 2023-08-28 DIAGNOSIS — R20.3 HYPERESTHESIA: Primary | ICD-10-CM

## 2023-08-28 PROCEDURE — 96365 THER/PROPH/DIAG IV INF INIT: CPT

## 2023-08-28 PROCEDURE — 258N000003 HC RX IP 258 OP 636: Performed by: PSYCHIATRY & NEUROLOGY

## 2023-08-28 PROCEDURE — 250N000011 HC RX IP 250 OP 636: Mod: JZ | Performed by: PSYCHIATRY & NEUROLOGY

## 2023-08-28 RX ORDER — MEPERIDINE HYDROCHLORIDE 25 MG/ML
25 INJECTION INTRAMUSCULAR; INTRAVENOUS; SUBCUTANEOUS EVERY 30 MIN PRN
Status: CANCELLED | OUTPATIENT
Start: 2023-09-07

## 2023-08-28 RX ORDER — ALBUTEROL SULFATE 0.83 MG/ML
2.5 SOLUTION RESPIRATORY (INHALATION)
Status: CANCELLED | OUTPATIENT
Start: 2023-09-07

## 2023-08-28 RX ORDER — ALBUTEROL SULFATE 90 UG/1
1-2 AEROSOL, METERED RESPIRATORY (INHALATION)
Status: CANCELLED
Start: 2023-09-07

## 2023-08-28 RX ORDER — HEPARIN SODIUM (PORCINE) LOCK FLUSH IV SOLN 100 UNIT/ML 100 UNIT/ML
5 SOLUTION INTRAVENOUS
Status: CANCELLED | OUTPATIENT
Start: 2023-09-07

## 2023-08-28 RX ORDER — EPINEPHRINE 1 MG/ML
0.3 INJECTION, SOLUTION INTRAMUSCULAR; SUBCUTANEOUS EVERY 5 MIN PRN
Status: CANCELLED | OUTPATIENT
Start: 2023-09-07

## 2023-08-28 RX ORDER — DIPHENHYDRAMINE HYDROCHLORIDE 50 MG/ML
50 INJECTION INTRAMUSCULAR; INTRAVENOUS
Status: CANCELLED
Start: 2023-09-07

## 2023-08-28 RX ORDER — METHYLPREDNISOLONE SODIUM SUCCINATE 125 MG/2ML
125 INJECTION, POWDER, LYOPHILIZED, FOR SOLUTION INTRAMUSCULAR; INTRAVENOUS
Status: CANCELLED
Start: 2023-09-07

## 2023-08-28 RX ORDER — HEPARIN SODIUM,PORCINE 10 UNIT/ML
5-20 VIAL (ML) INTRAVENOUS DAILY PRN
Status: CANCELLED | OUTPATIENT
Start: 2023-09-07

## 2023-08-28 RX ADMIN — SODIUM CHLORIDE 1000 MG: 9 INJECTION, SOLUTION INTRAVENOUS at 09:01

## 2023-08-28 ASSESSMENT — PAIN SCALES - GENERAL: PAINLEVEL: MILD PAIN (2)

## 2023-08-28 NOTE — PROGRESS NOTES
/Infusion Keith/sing Note:  Rose Ronquillo presents today for Methylprednisone.    Patient seen by provider today: No   present during visit today: Not Applicable.    Note:  Rose arrived ambulatory into the infusion center for Methylprednisolone infusion in a stable condition, VSS. Plan of care reviewed, she verbalized understanding of her plan of care and return to clinic. Next appointment 9/11.      Intravenous Access:  Peripheral IV placed.    Treatment Conditions:  Not Applicable.      Post Infusion Assessment:  Patient tolerated infusion without incident.  Blood return noted pre and post infusion.  No evidence of extravasations.  Access discontinued per protocol.       Discharge Plan:   Discharge instructions reviewed with: Patient.  Patient and/or family verbalized understanding of discharge instructions and all questions answered.  Patient discharged in stable condition accompanied by: self.  Departure Mode: Ambulatory.      Shannon Dior RN    yes

## 2023-09-08 ENCOUNTER — TELEPHONE (OUTPATIENT)
Dept: NEUROLOGY | Facility: CLINIC | Age: 33
End: 2023-09-08

## 2023-09-08 NOTE — TELEPHONE ENCOUNTER
I keep getting messages like this.  I do not understand how the methylprednisolone keeps falling out of the order set.  Can we look into this?    [11:15 AM] Mary Jane Fortune there - I am working on therapy plans for our infusion center patients for Monday Sept 11th.  Our mutual patient Rose Ronquillo  MRN  [id number] is on the schedule for her methylprednisolone infusion.  There are two active therapy plans however, neither of them have the methylprednisolone in them as those orders have been used up.  Could you update one of the plans to include more doses, discontinue the other plan and also clarify in the updated plan that the doses should now be every other week and for how long?  Thanks!      BENY

## 2023-09-10 NOTE — TELEPHONE ENCOUNTER
There is some confusion about the methylprednisolone dosing in the current therapy plans. Can you please tell me what dose of methylpred that Rose received for the previous infusions (it says 125mg, but I don't typically order lower than 500mg)?    Please let me know by early Monday (tomorrow) AM, so I can get the correct orders in for her infusion.    Any issues, call me at [phone].    Thank you,  Dr. Colvin

## 2023-09-11 ENCOUNTER — INFUSION THERAPY VISIT (OUTPATIENT)
Dept: INFUSION THERAPY | Facility: HOSPITAL | Age: 33
End: 2023-09-11
Attending: PSYCHIATRY & NEUROLOGY
Payer: COMMERCIAL

## 2023-09-11 VITALS
RESPIRATION RATE: 16 BRPM | OXYGEN SATURATION: 96 % | TEMPERATURE: 98.5 F | DIASTOLIC BLOOD PRESSURE: 70 MMHG | HEART RATE: 92 BPM | SYSTOLIC BLOOD PRESSURE: 116 MMHG

## 2023-09-11 DIAGNOSIS — R20.3 HYPERESTHESIA: Primary | ICD-10-CM

## 2023-09-11 PROCEDURE — 250N000011 HC RX IP 250 OP 636: Mod: JZ | Performed by: PSYCHIATRY & NEUROLOGY

## 2023-09-11 PROCEDURE — 96365 THER/PROPH/DIAG IV INF INIT: CPT

## 2023-09-11 PROCEDURE — 258N000003 HC RX IP 258 OP 636: Performed by: PSYCHIATRY & NEUROLOGY

## 2023-09-11 RX ORDER — ALBUTEROL SULFATE 0.83 MG/ML
2.5 SOLUTION RESPIRATORY (INHALATION)
Status: CANCELLED | OUTPATIENT
Start: 2023-09-11

## 2023-09-11 RX ORDER — HEPARIN SODIUM (PORCINE) LOCK FLUSH IV SOLN 100 UNIT/ML 100 UNIT/ML
5 SOLUTION INTRAVENOUS
Status: CANCELLED | OUTPATIENT
Start: 2023-09-11

## 2023-09-11 RX ORDER — ALBUTEROL SULFATE 90 UG/1
1-2 AEROSOL, METERED RESPIRATORY (INHALATION)
Status: CANCELLED
Start: 2023-09-11

## 2023-09-11 RX ORDER — DIPHENHYDRAMINE HYDROCHLORIDE 50 MG/ML
50 INJECTION INTRAMUSCULAR; INTRAVENOUS
Status: CANCELLED
Start: 2023-09-11

## 2023-09-11 RX ORDER — MEPERIDINE HYDROCHLORIDE 25 MG/ML
25 INJECTION INTRAMUSCULAR; INTRAVENOUS; SUBCUTANEOUS EVERY 30 MIN PRN
Status: CANCELLED | OUTPATIENT
Start: 2023-09-11

## 2023-09-11 RX ORDER — EPINEPHRINE 1 MG/ML
0.3 INJECTION, SOLUTION, CONCENTRATE INTRAVENOUS EVERY 5 MIN PRN
Status: CANCELLED | OUTPATIENT
Start: 2023-09-11

## 2023-09-11 RX ORDER — HEPARIN SODIUM,PORCINE 10 UNIT/ML
5-20 VIAL (ML) INTRAVENOUS DAILY PRN
Status: CANCELLED | OUTPATIENT
Start: 2023-09-11

## 2023-09-11 RX ORDER — EPINEPHRINE 1 MG/ML
0.3 INJECTION, SOLUTION INTRAMUSCULAR; SUBCUTANEOUS EVERY 5 MIN PRN
Status: CANCELLED | OUTPATIENT
Start: 2023-09-11

## 2023-09-11 RX ADMIN — SODIUM CHLORIDE 1000 MG: 9 INJECTION, SOLUTION INTRAVENOUS at 09:25

## 2023-09-11 RX ADMIN — SODIUM CHLORIDE 250 ML: 9 INJECTION, SOLUTION INTRAVENOUS at 09:06

## 2023-09-11 NOTE — TELEPHONE ENCOUNTER
[facility] center contacted this clinic. Phone number was given to contact Dr. Colvin directly as she is out of the office today.    Cassandra Woodson LPN on 9/11/2023 at 9:25 AM

## 2023-09-11 NOTE — PROGRESS NOTES
Infusion Nursing Note:  Rose Ronquillo presents today for Methylprednisolone infusion.    Patient seen by provider today: No   present during visit today: Not Applicable.    Note: Rose arrived ambulatory in stable condition. Contacted MD for further orders for methylprednisolone as it was not in therapy plan. Order received to infuse 1000 mg today. Will need further orders for upcoming appointments. Tolerated infusion well. Vital signs stable. Rose's next appointment is scheduled for 9/25/23.      Intravenous Access:  Peripheral IV placed.    Treatment Conditions:  Not Applicable.      Post Infusion Assessment:  Patient tolerated infusion without incident.  Site patent and intact, free from redness, edema or discomfort.  Access discontinued per protocol.       Discharge Plan:   Patient discharged in stable condition accompanied by: self.  Departure Mode: Ambulatory.      Rita Chinchilla RN

## 2023-09-13 ENCOUNTER — THERAPY VISIT (OUTPATIENT)
Dept: PHYSICAL THERAPY | Facility: CLINIC | Age: 33
End: 2023-09-13
Payer: COMMERCIAL

## 2023-09-13 DIAGNOSIS — M54.6 BILATERAL THORACIC BACK PAIN: Primary | ICD-10-CM

## 2023-09-13 PROCEDURE — 97140 MANUAL THERAPY 1/> REGIONS: CPT | Mod: GP | Performed by: PHYSICAL THERAPIST

## 2023-09-20 ENCOUNTER — THERAPY VISIT (OUTPATIENT)
Dept: PHYSICAL THERAPY | Facility: CLINIC | Age: 33
End: 2023-09-20
Payer: COMMERCIAL

## 2023-09-20 DIAGNOSIS — M54.6 BILATERAL THORACIC BACK PAIN: Primary | ICD-10-CM

## 2023-09-20 PROCEDURE — 97140 MANUAL THERAPY 1/> REGIONS: CPT | Mod: GP | Performed by: PHYSICAL THERAPIST

## 2023-09-23 DIAGNOSIS — R20.3 HYPERESTHESIA: Primary | ICD-10-CM

## 2023-09-23 RX ORDER — ALBUTEROL SULFATE 0.83 MG/ML
2.5 SOLUTION RESPIRATORY (INHALATION)
Status: CANCELLED | OUTPATIENT
Start: 2023-09-25

## 2023-09-23 RX ORDER — MEPERIDINE HYDROCHLORIDE 25 MG/ML
25 INJECTION INTRAMUSCULAR; INTRAVENOUS; SUBCUTANEOUS EVERY 30 MIN PRN
Status: CANCELLED | OUTPATIENT
Start: 2023-09-25

## 2023-09-23 RX ORDER — HEPARIN SODIUM,PORCINE 10 UNIT/ML
5-20 VIAL (ML) INTRAVENOUS DAILY PRN
Status: CANCELLED | OUTPATIENT
Start: 2023-09-25

## 2023-09-23 RX ORDER — EPINEPHRINE 1 MG/ML
0.3 INJECTION, SOLUTION, CONCENTRATE INTRAVENOUS EVERY 5 MIN PRN
Status: CANCELLED | OUTPATIENT
Start: 2023-09-25

## 2023-09-23 RX ORDER — METHYLPREDNISOLONE SODIUM SUCCINATE 125 MG/2ML
125 INJECTION, POWDER, LYOPHILIZED, FOR SOLUTION INTRAMUSCULAR; INTRAVENOUS
Status: CANCELLED
Start: 2023-09-25

## 2023-09-23 RX ORDER — DIPHENHYDRAMINE HYDROCHLORIDE 50 MG/ML
50 INJECTION INTRAMUSCULAR; INTRAVENOUS
Status: CANCELLED
Start: 2023-09-25

## 2023-09-23 RX ORDER — ALBUTEROL SULFATE 90 UG/1
1-2 AEROSOL, METERED RESPIRATORY (INHALATION)
Status: CANCELLED
Start: 2023-09-25

## 2023-09-23 RX ORDER — HEPARIN SODIUM (PORCINE) LOCK FLUSH IV SOLN 100 UNIT/ML 100 UNIT/ML
5 SOLUTION INTRAVENOUS
Status: CANCELLED | OUTPATIENT
Start: 2023-09-25

## 2023-09-25 ENCOUNTER — INFUSION THERAPY VISIT (OUTPATIENT)
Dept: INFUSION THERAPY | Facility: HOSPITAL | Age: 33
End: 2023-09-25
Attending: PSYCHIATRY & NEUROLOGY
Payer: COMMERCIAL

## 2023-09-25 VITALS
OXYGEN SATURATION: 97 % | HEART RATE: 80 BPM | TEMPERATURE: 98.3 F | DIASTOLIC BLOOD PRESSURE: 63 MMHG | RESPIRATION RATE: 18 BRPM | SYSTOLIC BLOOD PRESSURE: 92 MMHG

## 2023-09-25 DIAGNOSIS — R20.3 HYPERESTHESIA: Primary | ICD-10-CM

## 2023-09-25 PROCEDURE — 258N000003 HC RX IP 258 OP 636: Performed by: PSYCHIATRY & NEUROLOGY

## 2023-09-25 PROCEDURE — 96365 THER/PROPH/DIAG IV INF INIT: CPT

## 2023-09-25 PROCEDURE — 250N000011 HC RX IP 250 OP 636: Mod: JZ | Performed by: PSYCHIATRY & NEUROLOGY

## 2023-09-25 RX ORDER — DIPHENHYDRAMINE HYDROCHLORIDE 50 MG/ML
50 INJECTION INTRAMUSCULAR; INTRAVENOUS
Status: DISCONTINUED | OUTPATIENT
Start: 2023-09-25 | End: 2023-09-25 | Stop reason: HOSPADM

## 2023-09-25 RX ORDER — ALBUTEROL SULFATE 90 UG/1
1-2 AEROSOL, METERED RESPIRATORY (INHALATION)
Status: CANCELLED
Start: 2023-10-09

## 2023-09-25 RX ORDER — METHYLPREDNISOLONE SODIUM SUCCINATE 125 MG/2ML
125 INJECTION, POWDER, LYOPHILIZED, FOR SOLUTION INTRAMUSCULAR; INTRAVENOUS
Status: CANCELLED
Start: 2023-10-09

## 2023-09-25 RX ORDER — ALBUTEROL SULFATE 0.83 MG/ML
2.5 SOLUTION RESPIRATORY (INHALATION)
Status: DISCONTINUED | OUTPATIENT
Start: 2023-09-25 | End: 2023-09-25 | Stop reason: HOSPADM

## 2023-09-25 RX ORDER — MEPERIDINE HYDROCHLORIDE 25 MG/ML
25 INJECTION INTRAMUSCULAR; INTRAVENOUS; SUBCUTANEOUS EVERY 30 MIN PRN
Status: CANCELLED | OUTPATIENT
Start: 2023-10-09

## 2023-09-25 RX ORDER — EPINEPHRINE 1 MG/ML
0.3 INJECTION, SOLUTION INTRAMUSCULAR; SUBCUTANEOUS EVERY 5 MIN PRN
Status: CANCELLED | OUTPATIENT
Start: 2023-10-09

## 2023-09-25 RX ORDER — METHYLPREDNISOLONE SODIUM SUCCINATE 125 MG/2ML
125 INJECTION, POWDER, LYOPHILIZED, FOR SOLUTION INTRAMUSCULAR; INTRAVENOUS
Status: DISCONTINUED | OUTPATIENT
Start: 2023-09-25 | End: 2023-09-25 | Stop reason: HOSPADM

## 2023-09-25 RX ORDER — HEPARIN SODIUM,PORCINE 10 UNIT/ML
5-20 VIAL (ML) INTRAVENOUS DAILY PRN
Status: CANCELLED | OUTPATIENT
Start: 2023-10-09

## 2023-09-25 RX ORDER — DIPHENHYDRAMINE HYDROCHLORIDE 50 MG/ML
50 INJECTION INTRAMUSCULAR; INTRAVENOUS
Status: CANCELLED
Start: 2023-10-09

## 2023-09-25 RX ORDER — HEPARIN SODIUM (PORCINE) LOCK FLUSH IV SOLN 100 UNIT/ML 100 UNIT/ML
5 SOLUTION INTRAVENOUS
Status: CANCELLED | OUTPATIENT
Start: 2023-10-09

## 2023-09-25 RX ORDER — MEPERIDINE HYDROCHLORIDE 25 MG/ML
25 INJECTION INTRAMUSCULAR; INTRAVENOUS; SUBCUTANEOUS EVERY 30 MIN PRN
Status: DISCONTINUED | OUTPATIENT
Start: 2023-09-25 | End: 2023-09-25 | Stop reason: HOSPADM

## 2023-09-25 RX ORDER — ALBUTEROL SULFATE 0.83 MG/ML
2.5 SOLUTION RESPIRATORY (INHALATION)
Status: CANCELLED | OUTPATIENT
Start: 2023-10-09

## 2023-09-25 RX ORDER — EPINEPHRINE 1 MG/ML
0.3 INJECTION, SOLUTION INTRAMUSCULAR; SUBCUTANEOUS EVERY 5 MIN PRN
Status: DISCONTINUED | OUTPATIENT
Start: 2023-09-25 | End: 2023-09-25 | Stop reason: HOSPADM

## 2023-09-25 RX ORDER — ALBUTEROL SULFATE 90 UG/1
1-2 AEROSOL, METERED RESPIRATORY (INHALATION)
Status: DISCONTINUED | OUTPATIENT
Start: 2023-09-25 | End: 2023-09-25 | Stop reason: HOSPADM

## 2023-09-25 RX ADMIN — SODIUM CHLORIDE 1000 MG: 9 INJECTION, SOLUTION INTRAVENOUS at 09:09

## 2023-09-25 RX ADMIN — SODIUM CHLORIDE 250 ML: 9 INJECTION, SOLUTION INTRAVENOUS at 08:36

## 2023-10-09 ENCOUNTER — INFUSION THERAPY VISIT (OUTPATIENT)
Dept: INFUSION THERAPY | Facility: HOSPITAL | Age: 33
End: 2023-10-09
Attending: PSYCHIATRY & NEUROLOGY
Payer: COMMERCIAL

## 2023-10-09 VITALS
OXYGEN SATURATION: 96 % | HEART RATE: 92 BPM | RESPIRATION RATE: 16 BRPM | DIASTOLIC BLOOD PRESSURE: 70 MMHG | TEMPERATURE: 97.5 F | SYSTOLIC BLOOD PRESSURE: 114 MMHG

## 2023-10-09 DIAGNOSIS — R20.3 HYPERESTHESIA: Primary | ICD-10-CM

## 2023-10-09 PROCEDURE — 258N000003 HC RX IP 258 OP 636: Performed by: PSYCHIATRY & NEUROLOGY

## 2023-10-09 PROCEDURE — 96365 THER/PROPH/DIAG IV INF INIT: CPT

## 2023-10-09 PROCEDURE — 250N000011 HC RX IP 250 OP 636: Mod: JZ | Performed by: PSYCHIATRY & NEUROLOGY

## 2023-10-09 RX ORDER — MEPERIDINE HYDROCHLORIDE 25 MG/ML
25 INJECTION INTRAMUSCULAR; INTRAVENOUS; SUBCUTANEOUS EVERY 30 MIN PRN
Status: CANCELLED | OUTPATIENT
Start: 2023-10-23

## 2023-10-09 RX ORDER — HEPARIN SODIUM (PORCINE) LOCK FLUSH IV SOLN 100 UNIT/ML 100 UNIT/ML
5 SOLUTION INTRAVENOUS
Status: CANCELLED | OUTPATIENT
Start: 2023-10-23

## 2023-10-09 RX ORDER — ALBUTEROL SULFATE 90 UG/1
1-2 AEROSOL, METERED RESPIRATORY (INHALATION)
Status: CANCELLED
Start: 2023-10-23

## 2023-10-09 RX ORDER — DIPHENHYDRAMINE HYDROCHLORIDE 50 MG/ML
50 INJECTION INTRAMUSCULAR; INTRAVENOUS
Status: CANCELLED
Start: 2023-10-23

## 2023-10-09 RX ORDER — METHYLPREDNISOLONE SODIUM SUCCINATE 125 MG/2ML
125 INJECTION, POWDER, LYOPHILIZED, FOR SOLUTION INTRAMUSCULAR; INTRAVENOUS
Status: CANCELLED
Start: 2023-10-23

## 2023-10-09 RX ORDER — EPINEPHRINE 1 MG/ML
0.3 INJECTION, SOLUTION INTRAMUSCULAR; SUBCUTANEOUS EVERY 5 MIN PRN
Status: CANCELLED | OUTPATIENT
Start: 2023-10-23

## 2023-10-09 RX ORDER — HEPARIN SODIUM,PORCINE 10 UNIT/ML
5-20 VIAL (ML) INTRAVENOUS DAILY PRN
Status: CANCELLED | OUTPATIENT
Start: 2023-10-23

## 2023-10-09 RX ORDER — ALBUTEROL SULFATE 0.83 MG/ML
2.5 SOLUTION RESPIRATORY (INHALATION)
Status: CANCELLED | OUTPATIENT
Start: 2023-10-23

## 2023-10-09 RX ADMIN — SODIUM CHLORIDE 1000 MG: 9 INJECTION, SOLUTION INTRAVENOUS at 08:29

## 2023-10-09 NOTE — PROGRESS NOTES
Infusion Nursing Note:  Rose Ronquillo presents today for Methylprednisolone infusion  Patient seen by provider today: No   present during visit today: Not Applicable.    Note: Rose arrived ambulatory.  She reports a current sinus infection causing some pain behind eyes. Methylprednisolone infused over one hour and tolerated well. Rose is scheduled to return on 10/23/23.     Intravenous Access:  Peripheral IV placed.    Treatment Conditions:  Not Applicable.      Post Infusion Assessment:  Patient tolerated infusion without incident.  Site patent and intact, free from redness, edema or discomfort.  Access discontinued per protocol.       Discharge Plan:   Patient discharged in stable condition accompanied by: self.  Departure Mode: Ambulatory.      Rita Chinchilla RN

## 2023-10-11 ENCOUNTER — THERAPY VISIT (OUTPATIENT)
Dept: PHYSICAL THERAPY | Facility: CLINIC | Age: 33
End: 2023-10-11
Payer: COMMERCIAL

## 2023-10-11 DIAGNOSIS — M54.6 BILATERAL THORACIC BACK PAIN: Primary | ICD-10-CM

## 2023-10-11 PROCEDURE — 97140 MANUAL THERAPY 1/> REGIONS: CPT | Mod: GP | Performed by: PHYSICAL THERAPIST

## 2023-10-18 ENCOUNTER — THERAPY VISIT (OUTPATIENT)
Dept: PHYSICAL THERAPY | Facility: CLINIC | Age: 33
End: 2023-10-18
Payer: COMMERCIAL

## 2023-10-18 DIAGNOSIS — M54.6 BILATERAL THORACIC BACK PAIN: Primary | ICD-10-CM

## 2023-10-18 PROCEDURE — 97140 MANUAL THERAPY 1/> REGIONS: CPT | Mod: GP | Performed by: PHYSICAL THERAPIST

## 2023-10-23 ENCOUNTER — INFUSION THERAPY VISIT (OUTPATIENT)
Dept: INFUSION THERAPY | Facility: HOSPITAL | Age: 33
End: 2023-10-23
Attending: PSYCHIATRY & NEUROLOGY
Payer: COMMERCIAL

## 2023-10-23 VITALS
TEMPERATURE: 97.7 F | RESPIRATION RATE: 16 BRPM | HEART RATE: 77 BPM | OXYGEN SATURATION: 97 % | DIASTOLIC BLOOD PRESSURE: 72 MMHG | SYSTOLIC BLOOD PRESSURE: 104 MMHG

## 2023-10-23 DIAGNOSIS — R20.3 HYPERESTHESIA: Primary | ICD-10-CM

## 2023-10-23 PROCEDURE — 250N000011 HC RX IP 250 OP 636: Mod: JZ | Performed by: PSYCHIATRY & NEUROLOGY

## 2023-10-23 PROCEDURE — 258N000003 HC RX IP 258 OP 636: Performed by: PSYCHIATRY & NEUROLOGY

## 2023-10-23 PROCEDURE — 96365 THER/PROPH/DIAG IV INF INIT: CPT

## 2023-10-23 RX ORDER — ALBUTEROL SULFATE 90 UG/1
1-2 AEROSOL, METERED RESPIRATORY (INHALATION)
Status: CANCELLED
Start: 2023-11-06

## 2023-10-23 RX ORDER — MEPERIDINE HYDROCHLORIDE 25 MG/ML
25 INJECTION INTRAMUSCULAR; INTRAVENOUS; SUBCUTANEOUS EVERY 30 MIN PRN
Status: CANCELLED | OUTPATIENT
Start: 2023-11-06

## 2023-10-23 RX ORDER — HEPARIN SODIUM,PORCINE 10 UNIT/ML
5-20 VIAL (ML) INTRAVENOUS DAILY PRN
Status: CANCELLED | OUTPATIENT
Start: 2023-11-06

## 2023-10-23 RX ORDER — EPINEPHRINE 1 MG/ML
0.3 INJECTION, SOLUTION INTRAMUSCULAR; SUBCUTANEOUS EVERY 5 MIN PRN
Status: CANCELLED | OUTPATIENT
Start: 2023-11-06

## 2023-10-23 RX ORDER — HEPARIN SODIUM (PORCINE) LOCK FLUSH IV SOLN 100 UNIT/ML 100 UNIT/ML
5 SOLUTION INTRAVENOUS
Status: CANCELLED | OUTPATIENT
Start: 2023-11-06

## 2023-10-23 RX ORDER — METHYLPREDNISOLONE SODIUM SUCCINATE 125 MG/2ML
125 INJECTION, POWDER, LYOPHILIZED, FOR SOLUTION INTRAMUSCULAR; INTRAVENOUS
Status: CANCELLED
Start: 2023-11-06

## 2023-10-23 RX ORDER — METHYLPREDNISOLONE SODIUM SUCCINATE 125 MG/2ML
125 INJECTION, POWDER, LYOPHILIZED, FOR SOLUTION INTRAMUSCULAR; INTRAVENOUS
Status: DISCONTINUED | OUTPATIENT
Start: 2023-10-23 | End: 2023-10-23 | Stop reason: HOSPADM

## 2023-10-23 RX ORDER — EPINEPHRINE 1 MG/ML
0.3 INJECTION, SOLUTION INTRAMUSCULAR; SUBCUTANEOUS EVERY 5 MIN PRN
Status: DISCONTINUED | OUTPATIENT
Start: 2023-10-23 | End: 2023-10-23 | Stop reason: HOSPADM

## 2023-10-23 RX ORDER — MEPERIDINE HYDROCHLORIDE 25 MG/ML
25 INJECTION INTRAMUSCULAR; INTRAVENOUS; SUBCUTANEOUS EVERY 30 MIN PRN
Status: DISCONTINUED | OUTPATIENT
Start: 2023-10-23 | End: 2023-10-23 | Stop reason: HOSPADM

## 2023-10-23 RX ORDER — DIPHENHYDRAMINE HYDROCHLORIDE 50 MG/ML
50 INJECTION INTRAMUSCULAR; INTRAVENOUS
Status: CANCELLED
Start: 2023-11-06

## 2023-10-23 RX ORDER — ALBUTEROL SULFATE 90 UG/1
1-2 AEROSOL, METERED RESPIRATORY (INHALATION)
Status: DISCONTINUED | OUTPATIENT
Start: 2023-10-23 | End: 2023-10-23 | Stop reason: HOSPADM

## 2023-10-23 RX ORDER — DIPHENHYDRAMINE HYDROCHLORIDE 50 MG/ML
50 INJECTION INTRAMUSCULAR; INTRAVENOUS
Status: DISCONTINUED | OUTPATIENT
Start: 2023-10-23 | End: 2023-10-23 | Stop reason: HOSPADM

## 2023-10-23 RX ORDER — ALBUTEROL SULFATE 0.83 MG/ML
2.5 SOLUTION RESPIRATORY (INHALATION)
Status: DISCONTINUED | OUTPATIENT
Start: 2023-10-23 | End: 2023-10-23 | Stop reason: HOSPADM

## 2023-10-23 RX ORDER — ALBUTEROL SULFATE 0.83 MG/ML
2.5 SOLUTION RESPIRATORY (INHALATION)
Status: CANCELLED | OUTPATIENT
Start: 2023-11-06

## 2023-10-23 RX ADMIN — SODIUM CHLORIDE 250 ML: 9 INJECTION, SOLUTION INTRAVENOUS at 08:41

## 2023-10-23 RX ADMIN — SODIUM CHLORIDE 1000 MG: 9 INJECTION, SOLUTION INTRAVENOUS at 09:11

## 2023-10-23 ASSESSMENT — PAIN SCALES - GENERAL: PAINLEVEL: MILD PAIN (2)

## 2023-10-25 ENCOUNTER — THERAPY VISIT (OUTPATIENT)
Dept: PHYSICAL THERAPY | Facility: CLINIC | Age: 33
End: 2023-10-25
Payer: COMMERCIAL

## 2023-10-25 DIAGNOSIS — M54.6 BILATERAL THORACIC BACK PAIN: Primary | ICD-10-CM

## 2023-10-25 PROCEDURE — 97140 MANUAL THERAPY 1/> REGIONS: CPT | Mod: GP | Performed by: PHYSICAL THERAPIST

## 2023-10-25 PROCEDURE — 97112 NEUROMUSCULAR REEDUCATION: CPT | Mod: GP | Performed by: PHYSICAL THERAPIST

## 2023-10-25 NOTE — PROGRESS NOTES
10/25/23 0500   Appointment Info   Signing clinician's name / credentials Shannon Lane, PT, DPT   Total/Authorized Visits 30+12   Visits Used 31   Medical Diagnosis Scheuermann's kyphosis, Possibly Myelopathy   Quick Adds Pelvic Consent   Progress Note/Certification   Therapy Frequency 1x per week   Predicted Duration 12 weeks   Progress Note Due Date 12/06/23   Progress Note Completed Date 10/25/23   GOALS   PT Goals 2   PT Goal 1   Goal Identifier constipation   Goal Description pt will demo decreased constipation, 1 bowel movement daily of stool scale 3-4 to reduce pain in back   Rationale to maximize safety and independence with performance of ADLs and functional tasks   Goal Progress pt reports either has bowel movements >3 times per day or has bouts of constipation, not consistent.   Target Date 11/09/23   Subjective Report   Subjective Report pt reports she is about to start second round of antibiotics, still voice strained. pt reports bowel movements this week have been rough from antibiotics.   Objective Measures   Objective Measures Objective Measure 1;Objective Measure 2;Objective Measure 3   Objective Measure 2   Objective Measure PF strength   Details 4/5 however difficult to engage, pt indicates difficulty coordination. pt sluggish to contract and relax today.   Objective Measure 3   Objective Measure Gross Motor Strength   Details R Lower Extremity Strength: 5/5 hip flexion, 4/5 knee extension, 4/5 hamstring, dorsiflexion 5/5 and great toe extension 5/5, plantarflexion 5/5. L Gross Motor Strength: 3+/5 L hip flexion, 4-/5 L knee extension, 3+/5 hamstring L, dorsiflexion and great toe extension 5/5, plantarflexion 3/5 L Foot   Treatment Interventions (PT)   Interventions Manual Therapy;Neuromuscular Re-education   Therapeutic Procedure/Exercise   PTRx Ther Proc 1 Pelvic Floor Muscle Strengthening Elevator    PTRx Ther Proc 1 - Details per HEP you can do this laying down or sitting in a chair as  shown. engage the pelvic floor muscles then relax completely. you can do this 5 repetitions at a time. up to 4-5 times daily to make improvements in muscle coordination.   Neuromuscular Re-education   Neuromuscular re-ed of mvmt, balance, coord, kinesthetic sense, posture, proprioception minutes (84720) 10   Neuro Re-ed 1 contract/ relax education for pelvic floor internally in the 5 and 7:00 positions layer 2,3 and obturator internus R>L. pt able to demo kegel however required manual cueing on relaxation after engaging.   Skilled Intervention contract/ relax, cues on proper pelvic floor engagement   Patient Response/Progress pt reports could feel the muscles relax more easily.   Manual Therapy   Manual Therapy: Mobilization, MFR, MLD, friction massage minutes (55875) 30   Manual Therapy 1 external release of illiococcygeus and coccygeus R>L, ischial cavernosus, bulbocavernosus L>R. internally obturator internus L>R.  Diaphragam release with noticeable increased rib angle and reduced rib mobility on L>R. pt supine throughout treatment today. visceral mobility abdominal tissue, anterior abdominal inferior superior to umbilicis. Less tightness today  overall than last session.   Skilled Intervention increased ROM, reduced pelvic tone.   Patient Response/Progress pt reports overall feels better after PT today   Plan   Home program elevator exercise for pelvic floor. pt working with a lauro PT still for core strengthening.   Updates to plan of care pt sees Dr. Culp (spine surgeon) 11/29/2023   Plan for next session continue manual skills, education of pelvic floor contract/ relax including elevator today   Comments   Pelvic Health Informed Consent Statement Discussed with patient/guardian reason for referral regarding pelvic health needs and external/internal pelvic floor muscle examination.  Opportunity provided to ask questions and verbal consent for assessment and intervention was given.   Total Session Time    Timed Code Treatment Minutes 40   Total Treatment Time (sum of timed and untimed services) 40         PLAN  Continue therapy per current plan of care. Continue re training muscles as pt continues to work with spine and neurological care.    Beginning/End Dates of Progress Note Reporting Period:  8/1/2023 to 10/25/2023    Referring Provider:  Wong Culp

## 2023-11-02 ENCOUNTER — OFFICE VISIT (OUTPATIENT)
Dept: NEUROLOGY | Facility: CLINIC | Age: 33
End: 2023-11-02
Payer: COMMERCIAL

## 2023-11-02 VITALS — HEART RATE: 100 BPM | DIASTOLIC BLOOD PRESSURE: 77 MMHG | SYSTOLIC BLOOD PRESSURE: 103 MMHG

## 2023-11-02 DIAGNOSIS — Z79.899 ENCOUNTER FOR LONG-TERM (CURRENT) USE OF MEDICATIONS: ICD-10-CM

## 2023-11-02 DIAGNOSIS — R20.3 HYPERESTHESIA: Primary | ICD-10-CM

## 2023-11-02 DIAGNOSIS — M35.9 AUTOIMMUNE DISEASE (H): ICD-10-CM

## 2023-11-02 PROCEDURE — 99214 OFFICE O/P EST MOD 30 MIN: CPT | Performed by: PSYCHIATRY & NEUROLOGY

## 2023-11-02 RX ORDER — MEPERIDINE HYDROCHLORIDE 25 MG/ML
25 INJECTION INTRAMUSCULAR; INTRAVENOUS; SUBCUTANEOUS EVERY 30 MIN PRN
Status: CANCELLED | OUTPATIENT
Start: 2023-11-06

## 2023-11-02 RX ORDER — ALBUTEROL SULFATE 90 UG/1
1-2 AEROSOL, METERED RESPIRATORY (INHALATION)
Status: CANCELLED
Start: 2023-11-06

## 2023-11-02 RX ORDER — DIPHENHYDRAMINE HYDROCHLORIDE 50 MG/ML
50 INJECTION INTRAMUSCULAR; INTRAVENOUS
Status: CANCELLED
Start: 2023-11-06

## 2023-11-02 RX ORDER — EPINEPHRINE 1 MG/ML
0.3 INJECTION, SOLUTION, CONCENTRATE INTRAVENOUS EVERY 5 MIN PRN
Status: CANCELLED | OUTPATIENT
Start: 2023-11-06

## 2023-11-02 RX ORDER — HEPARIN SODIUM (PORCINE) LOCK FLUSH IV SOLN 100 UNIT/ML 100 UNIT/ML
5 SOLUTION INTRAVENOUS
Status: CANCELLED | OUTPATIENT
Start: 2023-11-06

## 2023-11-02 RX ORDER — ALBUTEROL SULFATE 0.83 MG/ML
2.5 SOLUTION RESPIRATORY (INHALATION)
Status: CANCELLED | OUTPATIENT
Start: 2023-11-06

## 2023-11-02 RX ORDER — HEPARIN SODIUM,PORCINE 10 UNIT/ML
5-20 VIAL (ML) INTRAVENOUS DAILY PRN
Status: CANCELLED | OUTPATIENT
Start: 2023-11-06

## 2023-11-02 RX ORDER — METHYLPREDNISOLONE SODIUM SUCCINATE 125 MG/2ML
125 INJECTION, POWDER, LYOPHILIZED, FOR SOLUTION INTRAMUSCULAR; INTRAVENOUS
Status: CANCELLED
Start: 2023-11-06

## 2023-11-02 NOTE — PROGRESS NOTES
ESTABLISHED PATIENT NEUROLOGY NOTE    DATE OF VISIT: 11/2/2023  MRN: [id number]  PATIENT NAME: Rose Ronquillo  YOB: 1990    Chief Complaint   Patient presents with    Follow Up     Want to discuss of lowering dosage of the steroids   Infusion has been helpful     SUBJECTIVE:                                                      HISTORY OF PRESENT ILLNESS:  Rsoe is here for follow up regarding weakness and paresthesias.    History as previously documented by me (7.25.23):  Extensive work-up has been unremarkable.  Please see my previous notes for additional details.  For the hyperesthesia and weakness I decided to start Shantel on weekly steroid infusions despite not having a clear autoimmune diagnosis.  She has undergone 4 weekly infusions of high-dose methylprednisolone.     She has reported losing the feelings in her legs on and off.  She has had problems with standing, noted to have Wolfforth maneuver and postural instability as well as hyperreflexia on prior exams.     Rose is here with her father.  She says that the steroid treatments have been a miracle.  Says she feels 75-80% better.  She is consistently having more feeling in her legs, is stronger and can do more from a balance standpoint.  She says she is able now to squat down and play with the dog and get up without difficulty.  She does still undergo dry needling through a friend and has noticed she has developed more and more sensation in the thighs where the treatments are completed.  Still has a little trouble with dexterity in the hands.  She does not necessarily notice wearing off of the improvement between doses of the steroids.  She has developed some bruising in her forearm where the IVs have been placed but otherwise no problems in terms of undergoing the actual infusions.  I have her on modafinil for fatigue.     They remind me that her father also underwent steroid and IVIG treatments in the past.     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  She is looking into getting a job now.  She asks about whether she can get into doing some exercise.  She plans to do Pilates to start.    Our plan was to continue weekly steroids for another 2 weeks and then spread the dosing out to every other week.  Rose says that she is having trouble the sleeping on the nights of her infusion.  She feels a little bit off the rest of the week but still much better than she was feeling in the past.     She still has some occasional problems with the Left leg. No paralysis as before. She has some occasional brief pains in the limbs. She says that sitting for too long (ex: at work) can make her back and legs hurt. She does get sore after hikes.  She has over the past week or so noticed some abdominal pain with eating but she has been doing more exercise including Pilates twice a week, and to her it feels like core muscle pain.    Rose is here with her mother and mom has some questions about alternative therapies.  She asks about ozone therapy, vitamin infusions, barometric therapy.    Overall Rose is happy with how she has been feeling.  She would like to try decreasing the dose of the steroids to see if she continues to have benefit with fewer side effects.    She did have an eye exam completed yesterday and she has some age-related changes but no other concerning findings.     CURRENT MEDICATIONS:   Calcium Carbonate Antacid (TUMS PO), Take  by mouth.  Cholecalciferol (VITAMIN D PO),   fish oil-omega-3 fatty acids (OMEGA-3 FISH OIL) 1000 MG capsule,   gabapentin (NEURONTIN) 100 MG capsule, Take 100 mg by mouth Takes 400mg in the morning  gabapentin (NEURONTIN) 300 MG capsule, Take 300 mg by mouth  ibuprofen (ADVIL,MOTRIN) 200 MG tablet, Take 200 mg by mouth every 4 hours as needed  Ipratropium-Albuterol (COMBIVENT RESPIMAT)  MCG/ACT inhaler, Inhale 1 puff into the lungs 4 times daily Not to exceed 6 doses per  day.  sertraline (ZOLOFT) 25 MG tablet, Take 1 tablet by mouth daily at 2 pm  VYVANSE 50 MG OR CAPS, 1 CAPSULE DAILY  ZOLOFT 50 MG OR TABS, I tablet daily  amLODIPine (NORVASC) 2.5 MG tablet, TAKE 1 TABLET(2.5 MG) BY MOUTH DAILY (Patient not taking: Reported on 9/11/2023)  modafinil (PROVIGIL) 100 MG tablet, Take 1 tablet (100 mg) by mouth daily (Patient not taking: Reported on 10/9/2023)  Multiple Vitamin (MULTI-VITAMIN PO),   nitroGLYcerin (NITRO-BID) 2 % OINT ointment, Place 1 inch (15 mg) onto the skin every 24 hours Apply to affected area ( toes or fingers)  at night and avoid touching eyes or face . (Patient not taking: Reported on 8/28/2023)    No current facility-administered medications on file prior to visit.      RECENT DIAGNOSTIC STUDIES:   Labs: No results found for any visits on 11/02/23.    REVIEW OF SYSTEMS:                                                      10-point review of systems is negative except as mentioned above in HPI.    EXAM:                                                      Physical Exam:   Vitals: /77   Pulse 100   BMI= There is no height or weight on file to calculate BMI.  GENERAL: NAD.  HEENT: NC/AT.  PULM: Non-labored breathing.   Focused Neurologic:  MENTAL STATUS: Alert, attentive. Speech is fluent. Normal comprehension. Normal concentration. Adequate fund of knowledge.   CRANIAL NERVES: Visual fields intact to confrontation. Pupils equally, round and reactive to light. Facial sensation and movement normal. EOM full. Hearing intact to conversation. Trapezius strength intact. Palate moves symmetrically. Tongue midline.  MOTOR: Strength is full in the deltoids, biceps, triceps and hands.  Strength in the legs is again full on today's exam.  Tone and bulk normal.   DTRs: Intact and symmetric in biceps, BR, patellae.  2+/2 in the upper extremities, slightly brisk at the knees. No crossed adductor response.  This is an improvement.   SENSATION: Normal light touch  throughout.  COORDINATION: Normal finger nose finger bilaterally.  STATION AND GAIT: Casual gait is normal.   Right hand-dominant.    ASSESSMENT and PLAN:                                                      Assessment:    ICD-10-CM    1. Hyperesthesia  R20.3       2. Autoimmune disease (H24)  M35.9       3. Encounter for long-term (current) use of medications  Z79.899           Ms. Ronquillo is a pleasant 33-year-old woman here for follow-up regarding acute onset weakness/paresthesias of unclear etiology.  She has improved quite a bit with pulse steroid treatments.  We have pulled back on the dose frequency and we will now try lowering the infusion dose to 500mg each treatment.  I asked Rose to keep me posted on her response.  She can continue to exercise, though I did ask her to listen to her body and not overdo it.  We will follow-up again in a few months.  Rose expressed understanding and agreement with the plan.    Plan:  -- I am going to update your infusion orders.  We will decrease to 500mg and continue the every other week dosing.  -- Follow-up again in 3 months.  Please feel free to call if any new concerns arise in the meantime.  -- Okay to increase exercise as much as you can tolerate.  -- I do recommend you get the flu and COVID shots.    Total Time: 30 minutes were spent with the patient and in chart review/documentation (as described above in Assessment and Plan)/coordinating the care on date of service.    Yolande Colvin MD  Neurology    Dragon software used in the dictation of this note.

## 2023-11-02 NOTE — NURSING NOTE
"Rose Ronquillo is a 33 year old female who presents for:  Chief Complaint   Patient presents with    Follow Up     Want to discuss of lowering dosage of the steroids   Infusion has been helpful        Initial Vitals:  /77   Pulse 100  Estimated body mass index is 20.07 kg/m  as calculated from the following:    Height as of 4/19/23: 1.727 m (5' 8\").    Weight as of 5/8/23: 59.9 kg (132 lb).. There is no height or weight on file to calculate BSA. BP completed using cuff size: wrist cuff    Jeronimo PAYTON Field  "

## 2023-11-02 NOTE — PATIENT INSTRUCTIONS
Plan:  -- I am going to update your infusion orders.  We will decrease to 500mg and continue the every other week dosing.  -- Follow-up again in 3 months.  Please feel free to call if any new concerns arise in the meantime.  -- Okay to increase exercise as much as you can tolerate.  -- I do recommend you get the flu and COVID shots.

## 2023-11-02 NOTE — LETTER
11/2/2023         RE: Rose Ronquillo  [address] [facility] [address]        Dear Colleague,    Thank you for referring your patient, Rose Ronquillo, to the [facility] [place]. Please see a copy of my visit note below.    ESTABLISHED PATIENT NEUROLOGY NOTE    DATE OF VISIT: 11/2/2023  MRN: [id number]  PATIENT NAME: Rose Ronquillo  YOB: 1990    Chief Complaint   Patient presents with     Follow Up     Want to discuss of lowering dosage of the steroids   Infusion has been helpful     SUBJECTIVE:                                                      HISTORY OF PRESENT ILLNESS:  Rose is here for follow up regarding weakness and paresthesias.    History as previously documented by me (7.25.23):  Extensive work-up has been unremarkable.  Please see my previous notes for additional details.  For the hyperesthesia and weakness I decided to start Shantel on weekly steroid infusions despite not having a clear autoimmune diagnosis.  She has undergone 4 weekly infusions of high-dose methylprednisolone.     She has reported losing the feelings in her legs on and off.  She has had problems with standing, noted to have Karen maneuver and postural instability as well as hyperreflexia on prior exams.     Rose is here with her father.  She says that the steroid treatments have been a miracle.  Says she feels 75-80% better.  She is consistently having more feeling in her legs, is stronger and can do more from a balance standpoint.  She says she is able now to squat down and play with the dog and get up without difficulty.  She does still undergo dry needling through a friend and has noticed she has developed more and more sensation in the thighs where the treatments are completed.  Still has a little trouble with dexterity in the hands.  She does not necessarily notice wearing off of the improvement between doses of the steroids.  She has developed some bruising in her forearm  where the IVs have been placed but otherwise no problems in terms of undergoing the actual infusions.  I have her on modafinil for fatigue.     They remind me that her father also underwent steroid and IVIG treatments in the past.     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  She is looking into getting a job now.  She asks about whether she can get into doing some exercise.  She plans to do Pilates to start.    Our plan was to continue weekly steroids for another 2 weeks and then spread the dosing out to every other week.  Rose says that she is having trouble the sleeping on the nights of her infusion.  She feels a little bit off the rest of the week but still much better than she was feeling in the past.     She still has some occasional problems with the Left leg. No paralysis as before. She has some occasional brief pains in the limbs. She says that sitting for too long (ex: at work) can make her back and legs hurt. She does get sore after hikes.  She has over the past week or so noticed some abdominal pain with eating but she has been doing more exercise including Pilates twice a week, and to her it feels like core muscle pain.    Rose is here with her mother and mom has some questions about alternative therapies.  She asks about ozone therapy, vitamin infusions, barometric therapy.    Overall Rose is happy with how she has been feeling.  She would like to try decreasing the dose of the steroids to see if she continues to have benefit with fewer side effects.    She did have an eye exam completed yesterday and she has some age-related changes but no other concerning findings.     CURRENT MEDICATIONS:   Calcium Carbonate Antacid (TUMS PO), Take  by mouth.  Cholecalciferol (VITAMIN D PO),   fish oil-omega-3 fatty acids (OMEGA-3 FISH OIL) 1000 MG capsule,   gabapentin (NEURONTIN) 100 MG capsule, Take 100 mg by mouth Takes 400mg in the morning  gabapentin (NEURONTIN) 300 MG  capsule, Take 300 mg by mouth  ibuprofen (ADVIL,MOTRIN) 200 MG tablet, Take 200 mg by mouth every 4 hours as needed  Ipratropium-Albuterol (COMBIVENT RESPIMAT)  MCG/ACT inhaler, Inhale 1 puff into the lungs 4 times daily Not to exceed 6 doses per day.  sertraline (ZOLOFT) 25 MG tablet, Take 1 tablet by mouth daily at 2 pm  VYVANSE 50 MG OR CAPS, 1 CAPSULE DAILY  ZOLOFT 50 MG OR TABS, I tablet daily  amLODIPine (NORVASC) 2.5 MG tablet, TAKE 1 TABLET(2.5 MG) BY MOUTH DAILY (Patient not taking: Reported on 9/11/2023)  modafinil (PROVIGIL) 100 MG tablet, Take 1 tablet (100 mg) by mouth daily (Patient not taking: Reported on 10/9/2023)  Multiple Vitamin (MULTI-VITAMIN PO),   nitroGLYcerin (NITRO-BID) 2 % OINT ointment, Place 1 inch (15 mg) onto the skin every 24 hours Apply to affected area ( toes or fingers)  at night and avoid touching eyes or face . (Patient not taking: Reported on 8/28/2023)    No current facility-administered medications on file prior to visit.      RECENT DIAGNOSTIC STUDIES:   Labs: No results found for any visits on 11/02/23.    REVIEW OF SYSTEMS:                                                      10-point review of systems is negative except as mentioned above in HPI.    EXAM:                                                      Physical Exam:   Vitals: /77   Pulse 100   BMI= There is no height or weight on file to calculate BMI.  GENERAL: NAD.  HEENT: NC/AT.  PULM: Non-labored breathing.   Focused Neurologic:  MENTAL STATUS: Alert, attentive. Speech is fluent. Normal comprehension. Normal concentration. Adequate fund of knowledge.   CRANIAL NERVES: Visual fields intact to confrontation. Pupils equally, round and reactive to light. Facial sensation and movement normal. EOM full. Hearing intact to conversation. Trapezius strength intact. Palate moves symmetrically. Tongue midline.  MOTOR: Strength is full in the deltoids, biceps, triceps and hands.  Strength in the legs is again  full on today's exam.  Tone and bulk normal.   DTRs: Intact and symmetric in biceps, BR, patellae.  2+/2 in the upper extremities, slightly brisk at the knees. No crossed adductor response.  This is an improvement.   SENSATION: Normal light touch throughout.  COORDINATION: Normal finger nose finger bilaterally.  STATION AND GAIT: Casual gait is normal.   Right hand-dominant.    ASSESSMENT and PLAN:                                                      Assessment:    ICD-10-CM    1. Hyperesthesia  R20.3       2. Autoimmune disease (H24)  M35.9       3. Encounter for long-term (current) use of medications  Z79.899           Ms. Ronquillo is a pleasant 33-year-old woman here for follow-up regarding acute onset weakness/paresthesias of unclear etiology.  She has improved quite a bit with pulse steroid treatments.  We have pulled back on the dose frequency and we will now try lowering the infusion dose to 500mg each treatment.  I asked Rose to keep me posted on her response.  She can continue to exercise, though I did ask her to listen to her body and not overdo it.  We will follow-up again in a few months.  Rose expressed understanding and agreement with the plan.    Plan:  -- I am going to update your infusion orders.  We will decrease to 500mg and continue the every other week dosing.  -- Follow-up again in 3 months.  Please feel free to call if any new concerns arise in the meantime.  -- Okay to increase exercise as much as you can tolerate.  -- I do recommend you get the flu and COVID shots.    Total Time: 30 minutes were spent with the patient and in chart review/documentation (as described above in Assessment and Plan)/coordinating the care on date of service.    Yolande Colvin MD  Neurology    Dragon software used in the dictation of this note.                    Again, thank you for allowing me to participate in the care of your patient.        Sincerely,        Yolande Colvin MD

## 2023-11-06 ENCOUNTER — INFUSION THERAPY VISIT (OUTPATIENT)
Dept: INFUSION THERAPY | Facility: HOSPITAL | Age: 33
End: 2023-11-06
Attending: PSYCHIATRY & NEUROLOGY
Payer: COMMERCIAL

## 2023-11-06 VITALS
DIASTOLIC BLOOD PRESSURE: 64 MMHG | SYSTOLIC BLOOD PRESSURE: 97 MMHG | OXYGEN SATURATION: 97 % | RESPIRATION RATE: 18 BRPM | TEMPERATURE: 98.7 F | HEART RATE: 83 BPM

## 2023-11-06 DIAGNOSIS — R20.3 HYPERESTHESIA: Primary | ICD-10-CM

## 2023-11-06 PROCEDURE — 96365 THER/PROPH/DIAG IV INF INIT: CPT

## 2023-11-06 PROCEDURE — 250N000011 HC RX IP 250 OP 636: Performed by: PSYCHIATRY & NEUROLOGY

## 2023-11-06 PROCEDURE — 258N000003 HC RX IP 258 OP 636: Performed by: PSYCHIATRY & NEUROLOGY

## 2023-11-06 RX ORDER — ALBUTEROL SULFATE 90 UG/1
1-2 AEROSOL, METERED RESPIRATORY (INHALATION)
Status: DISCONTINUED | OUTPATIENT
Start: 2023-11-06 | End: 2023-11-06 | Stop reason: HOSPADM

## 2023-11-06 RX ORDER — DIPHENHYDRAMINE HYDROCHLORIDE 50 MG/ML
50 INJECTION INTRAMUSCULAR; INTRAVENOUS
Status: DISCONTINUED | OUTPATIENT
Start: 2023-11-06 | End: 2023-11-06 | Stop reason: HOSPADM

## 2023-11-06 RX ORDER — HEPARIN SODIUM,PORCINE 10 UNIT/ML
5-20 VIAL (ML) INTRAVENOUS DAILY PRN
Status: CANCELLED | OUTPATIENT
Start: 2023-11-20

## 2023-11-06 RX ORDER — MEPERIDINE HYDROCHLORIDE 25 MG/ML
25 INJECTION INTRAMUSCULAR; INTRAVENOUS; SUBCUTANEOUS EVERY 30 MIN PRN
Status: CANCELLED | OUTPATIENT
Start: 2023-11-20

## 2023-11-06 RX ORDER — MEPERIDINE HYDROCHLORIDE 25 MG/ML
25 INJECTION INTRAMUSCULAR; INTRAVENOUS; SUBCUTANEOUS EVERY 30 MIN PRN
Status: DISCONTINUED | OUTPATIENT
Start: 2023-11-06 | End: 2023-11-06 | Stop reason: HOSPADM

## 2023-11-06 RX ORDER — EPINEPHRINE 1 MG/ML
0.3 INJECTION, SOLUTION INTRAMUSCULAR; SUBCUTANEOUS EVERY 5 MIN PRN
Status: DISCONTINUED | OUTPATIENT
Start: 2023-11-06 | End: 2023-11-06 | Stop reason: HOSPADM

## 2023-11-06 RX ORDER — ALBUTEROL SULFATE 0.83 MG/ML
2.5 SOLUTION RESPIRATORY (INHALATION)
Status: DISCONTINUED | OUTPATIENT
Start: 2023-11-06 | End: 2023-11-06 | Stop reason: HOSPADM

## 2023-11-06 RX ORDER — EPINEPHRINE 1 MG/ML
0.3 INJECTION, SOLUTION INTRAMUSCULAR; SUBCUTANEOUS EVERY 5 MIN PRN
Status: CANCELLED | OUTPATIENT
Start: 2023-11-20

## 2023-11-06 RX ORDER — HEPARIN SODIUM (PORCINE) LOCK FLUSH IV SOLN 100 UNIT/ML 100 UNIT/ML
5 SOLUTION INTRAVENOUS
Status: CANCELLED | OUTPATIENT
Start: 2023-11-20

## 2023-11-06 RX ORDER — METHYLPREDNISOLONE SODIUM SUCCINATE 125 MG/2ML
125 INJECTION, POWDER, LYOPHILIZED, FOR SOLUTION INTRAMUSCULAR; INTRAVENOUS
Status: CANCELLED
Start: 2023-11-20

## 2023-11-06 RX ORDER — DIPHENHYDRAMINE HYDROCHLORIDE 50 MG/ML
50 INJECTION INTRAMUSCULAR; INTRAVENOUS
Status: CANCELLED
Start: 2023-11-20

## 2023-11-06 RX ORDER — ALBUTEROL SULFATE 0.83 MG/ML
2.5 SOLUTION RESPIRATORY (INHALATION)
Status: CANCELLED | OUTPATIENT
Start: 2023-11-20

## 2023-11-06 RX ORDER — ALBUTEROL SULFATE 90 UG/1
1-2 AEROSOL, METERED RESPIRATORY (INHALATION)
Status: CANCELLED
Start: 2023-11-20

## 2023-11-06 RX ORDER — METHYLPREDNISOLONE SODIUM SUCCINATE 125 MG/2ML
125 INJECTION, POWDER, LYOPHILIZED, FOR SOLUTION INTRAMUSCULAR; INTRAVENOUS
Status: DISCONTINUED | OUTPATIENT
Start: 2023-11-06 | End: 2023-11-06 | Stop reason: HOSPADM

## 2023-11-06 RX ADMIN — SODIUM CHLORIDE 500 MG: 9 INJECTION, SOLUTION INTRAVENOUS at 08:53

## 2023-11-06 RX ADMIN — SODIUM CHLORIDE 250 ML: 9 INJECTION, SOLUTION INTRAVENOUS at 08:32

## 2023-11-06 ASSESSMENT — PAIN SCALES - GENERAL: PAINLEVEL: NO PAIN (0)

## 2023-11-06 NOTE — PROGRESS NOTES
Infusion Nursing Note:  Rose Ronquillo presents today for Methylprednisolone.    Patient seen by provider today: No   present during visit today: Not Applicable.    Note: Rose arrived ambulatory into the infusion center for Methylprednisolone infusion in a stable condition, VSS. Plan of care reviewed, she verbalized understanding of her plan of care and return to clinic. She acknowledged her Methylprednisolone is now reduced  to 500 mg, half of the previous dose.    Intravenous Access:  Peripheral IV placed.  Treatment Conditions:  Not Applicable.  Post Infusion Assessment:  Patient tolerated infusion without incident.  Site patent and intact, free from redness, edema or discomfort.  No evidence of extravasations.  Access discontinued per protocol.   Discharge Plan:   Discharge instructions reviewed with: Patient.  Patient and/or family verbalized understanding of discharge instructions and all questions answered.  Patient discharged in stable condition accompanied by: self.  Departure Mode: Ambulatory.    Gabriella Dang RN

## 2023-11-14 ENCOUNTER — THERAPY VISIT (OUTPATIENT)
Dept: PHYSICAL THERAPY | Facility: CLINIC | Age: 33
End: 2023-11-14
Payer: COMMERCIAL

## 2023-11-14 DIAGNOSIS — M54.6 ACUTE BILATERAL THORACIC BACK PAIN: Primary | ICD-10-CM

## 2023-11-14 PROCEDURE — 97530 THERAPEUTIC ACTIVITIES: CPT | Mod: GP

## 2023-11-14 PROCEDURE — 97140 MANUAL THERAPY 1/> REGIONS: CPT | Mod: GP

## 2023-11-20 ENCOUNTER — INFUSION THERAPY VISIT (OUTPATIENT)
Dept: INFUSION THERAPY | Facility: HOSPITAL | Age: 33
End: 2023-11-20
Payer: COMMERCIAL

## 2023-11-20 VITALS
TEMPERATURE: 98.3 F | HEART RATE: 82 BPM | OXYGEN SATURATION: 98 % | RESPIRATION RATE: 18 BRPM | DIASTOLIC BLOOD PRESSURE: 62 MMHG | SYSTOLIC BLOOD PRESSURE: 96 MMHG

## 2023-11-20 DIAGNOSIS — R20.3 HYPERESTHESIA: Primary | ICD-10-CM

## 2023-11-20 PROCEDURE — 250N000011 HC RX IP 250 OP 636: Mod: JZ | Performed by: PSYCHIATRY & NEUROLOGY

## 2023-11-20 PROCEDURE — 258N000003 HC RX IP 258 OP 636: Performed by: PSYCHIATRY & NEUROLOGY

## 2023-11-20 PROCEDURE — 96366 THER/PROPH/DIAG IV INF ADDON: CPT

## 2023-11-20 PROCEDURE — 96365 THER/PROPH/DIAG IV INF INIT: CPT

## 2023-11-20 RX ORDER — HEPARIN SODIUM,PORCINE 10 UNIT/ML
5-20 VIAL (ML) INTRAVENOUS DAILY PRN
Status: CANCELLED | OUTPATIENT
Start: 2023-12-04

## 2023-11-20 RX ORDER — HEPARIN SODIUM (PORCINE) LOCK FLUSH IV SOLN 100 UNIT/ML 100 UNIT/ML
5 SOLUTION INTRAVENOUS
Status: CANCELLED | OUTPATIENT
Start: 2023-12-04

## 2023-11-20 RX ADMIN — SODIUM CHLORIDE 500 MG: 9 INJECTION, SOLUTION INTRAVENOUS at 09:26

## 2023-11-20 ASSESSMENT — PAIN SCALES - GENERAL: PAINLEVEL: NO PAIN (0)

## 2023-11-21 ENCOUNTER — THERAPY VISIT (OUTPATIENT)
Dept: PHYSICAL THERAPY | Facility: CLINIC | Age: 33
End: 2023-11-21
Payer: COMMERCIAL

## 2023-11-21 DIAGNOSIS — M54.6 ACUTE BILATERAL THORACIC BACK PAIN: Primary | ICD-10-CM

## 2023-11-21 PROCEDURE — 97140 MANUAL THERAPY 1/> REGIONS: CPT | Mod: GP

## 2023-11-27 DIAGNOSIS — M42.00 SCHEUERMANN'S KYPHOSIS: Primary | ICD-10-CM

## 2023-11-29 ENCOUNTER — OFFICE VISIT (OUTPATIENT)
Dept: ORTHOPEDICS | Facility: CLINIC | Age: 33
End: 2023-11-29
Payer: COMMERCIAL

## 2023-11-29 ENCOUNTER — ANCILLARY PROCEDURE (OUTPATIENT)
Dept: GENERAL RADIOLOGY | Facility: CLINIC | Age: 33
End: 2023-11-29
Attending: ORTHOPAEDIC SURGERY
Payer: COMMERCIAL

## 2023-11-29 DIAGNOSIS — M41.55 OTHER SECONDARY SCOLIOSIS, THORACOLUMBAR REGION: ICD-10-CM

## 2023-11-29 DIAGNOSIS — M42.00 SCHEUERMANN'S KYPHOSIS: ICD-10-CM

## 2023-11-29 DIAGNOSIS — M42.00 SCHEUERMANN'S KYPHOSIS: Primary | ICD-10-CM

## 2023-11-29 PROCEDURE — 72082 X-RAY EXAM ENTIRE SPI 2/3 VW: CPT | Performed by: STUDENT IN AN ORGANIZED HEALTH CARE EDUCATION/TRAINING PROGRAM

## 2023-11-29 PROCEDURE — 99214 OFFICE O/P EST MOD 30 MIN: CPT | Performed by: ORTHOPAEDIC SURGERY

## 2023-11-29 PROCEDURE — 77073 BONE LENGTH STUDIES: CPT | Performed by: STUDENT IN AN ORGANIZED HEALTH CARE EDUCATION/TRAINING PROGRAM

## 2023-11-29 NOTE — NURSING NOTE
Reason For Visit:   Chief Complaint   Patient presents with    RECHECK      F/U PT for Scheuermann's kyphosis       Primary MD: Melani Norman  Ref. MD: EST    ?  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 Yes.  Work status?  Part-time.     Date of injury: No  Type of injury: childhood scoli.     Date of surgery: No  Type of surgery: No.     Smoker: No  Request smoking cessation information: No    There were no vitals taken for this visit.    Pain Assessment  Patient Currently in Pain: Yes  0-10 Pain Scale: 3  Primary Pain Location: Back    Oswestry ([facility]) Questionnaire        11/29/2023    11:41 AM   OSWESTRY DISABILITY INDEX   Count 9   Sum 15   Oswestry Score (%) 33.33 %        Visual Analog Pain Scale  Back Pain Scale 0-10: 2.5  Right leg pain: 0  Left leg pain: 0  Neck Pain Scale 0-10: 0  Right arm pain: 0  Left arm pain: 0    Promis 10 Assessment        11/29/2023    11:42 AM   PROMIS 10   In general, would you say your health is: Very good   In general, would you say your quality of life is: Very good   In general, how would you rate your physical health? Very good   In general, how would you rate your mental health, including your mood and your ability to think? Very good   In general, how would you rate your satisfaction with your social activities and relationships? Very good   In general, please rate how well you carry out your usual social activities and roles Very good   To what extent are you able to carry out your everyday physical activities such as walking, climbing stairs, carrying groceries, or moving a chair? Mostly   In the past 7 days, how often have you been bothered by emotional problems such as feeling anxious, depressed, or irritable? Rarely   In the past 7 days, how would you rate your fatigue on average? Mild   In the past 7 days, how would you rate your pain on average, where 0 means no pain, and 10 means worst imaginable pain? 3   In general, would you say your  health is: 4   In general, would you say your quality of life is: 4   In general, how would you rate your physical health? 4   In general, how would you rate your mental health, including your mood and your ability to think? 4   In general, how would you rate your satisfaction with your social activities and relationships? 4   In general, please rate how well you carry out your usual social activities and roles. (This includes activities at home, at work and in your community, and responsibilities as a parent, child, spouse, employee, friend, etc.) 4   To what extent are you able to carry out your everyday physical activities such as walking, climbing stairs, carrying groceries, or moving a chair? 4   In the past 7 days, how often have you been bothered by emotional problems such as feeling anxious, depressed, or irritable? 2   In the past 7 days, how would you rate your fatigue on average? 2   In the past 7 days, how would you rate your pain on average, where 0 means no pain, and 10 means worst imaginable pain? 3   Global Mental Health Score 16   Global Physical Health Score 16   PROMIS TOTAL - SUBSCORES 32                Jackelin Diallo LPN

## 2023-11-29 NOTE — PROGRESS NOTES
Shantel is back for routine follow-up today.  She has been doing reasonably well.  Neurology started her on corticosteroid infusions every 2 weeks.  Initially on a large dose and now it is being tapered.  This has improved her pain status quite a bit.  She is also quite active in strengthening programs including several times a week on Pilates.  She does have to wear a facemask as she gets upper respiratory infections very easily now.  Current visual analog pain scale is a 3.  Oswestry score is a 33%.    Objective: Physical exam reveals a well-developed thin female in no acute distress.  Evaluation of her stance shows that she has a hyper sagittal contour.  Coronal balance is excellent.  Gait and station appear normal.    Imaging: AP and lateral EOS imaging is performed today.  I have independently reviewed and interpreted this imaging study.  There has been no significant interval change.          Assessment: Spinal alignment consistent with Scheuerman's kyphosis.  Symptomatology unclear etiology presumed autoimmune problem responsive to steroid administration.    Plan: Given that she is on steroids I do think it is critical that she work with her primary care physician and she should probably have a baseline DEXA.  Maintaining her optimal bone density will be really important going forward.  In terms of her spine she is doing the right things.  Maintaining activity is going to be critical going forward.  I be happy to see her back in 9 to 12 months and then after that she will need to transition to one of my partners.  She and her dad asked appropriate questions and had them answered to their apparent satisfaction.    Total contact time for this visit including image ordering, image interpretation, face-to-face evaluation, documentation was greater than 30 minutes.

## 2023-11-29 NOTE — LETTER
11/29/2023         RE: Rose Ronquillo  [address] [address]        Dear Colleague,    Thank you for referring your patient, Rose Ronquillo, to the [facility] [place]. Please see a copy of my visit note below.    Shantel is back for routine follow-up today.  She has been doing reasonably well.  Neurology started her on corticosteroid infusions every 2 weeks.  Initially on a large dose and now it is being tapered.  This has improved her pain status quite a bit.  She is also quite active in strengthening programs including several times a week on Pilates.  She does have to wear a facemask as she gets upper respiratory infections very easily now.  Current visual analog pain scale is a 3.  Oswestry score is a 33%.    Objective: Physical exam reveals a well-developed thin female in no acute distress.  Evaluation of her stance shows that she has a hyper sagittal contour.  Coronal balance is excellent.  Gait and station appear normal.    Imaging: AP and lateral EOS imaging is performed today.  I have independently reviewed and interpreted this imaging study.  There has been no significant interval change.          Assessment: Spinal alignment consistent with Scheuerman's kyphosis.  Symptomatology unclear etiology presumed autoimmune problem responsive to steroid administration.    Plan: Given that she is on steroids I do think it is critical that she work with her primary care physician and she should probably have a baseline DEXA.  Maintaining her optimal bone density will be really important going forward.  In terms of her spine she is doing the right things.  Maintaining activity is going to be critical going forward.  I be happy to see her back in 9 to 12 months and then after that she will need to transition to one of my partners.  She and her dad asked appropriate questions and had them answered to their apparent satisfaction.    Total contact time for this visit  including image ordering, image interpretation, face-to-face evaluation, documentation was greater than 30 minutes.      Wong Culp MD

## 2023-12-04 ENCOUNTER — INFUSION THERAPY VISIT (OUTPATIENT)
Dept: INFUSION THERAPY | Facility: HOSPITAL | Age: 33
End: 2023-12-04
Payer: COMMERCIAL

## 2023-12-04 VITALS
OXYGEN SATURATION: 96 % | RESPIRATION RATE: 20 BRPM | HEART RATE: 91 BPM | SYSTOLIC BLOOD PRESSURE: 118 MMHG | TEMPERATURE: 98 F | DIASTOLIC BLOOD PRESSURE: 69 MMHG

## 2023-12-04 DIAGNOSIS — R20.3 HYPERESTHESIA: Primary | ICD-10-CM

## 2023-12-04 PROCEDURE — 258N000003 HC RX IP 258 OP 636: Performed by: PSYCHIATRY & NEUROLOGY

## 2023-12-04 PROCEDURE — 250N000011 HC RX IP 250 OP 636: Performed by: PSYCHIATRY & NEUROLOGY

## 2023-12-04 PROCEDURE — 96365 THER/PROPH/DIAG IV INF INIT: CPT

## 2023-12-04 RX ORDER — HEPARIN SODIUM,PORCINE 10 UNIT/ML
5-20 VIAL (ML) INTRAVENOUS DAILY PRN
Status: CANCELLED | OUTPATIENT
Start: 2023-12-18

## 2023-12-04 RX ORDER — HEPARIN SODIUM (PORCINE) LOCK FLUSH IV SOLN 100 UNIT/ML 100 UNIT/ML
5 SOLUTION INTRAVENOUS
Status: CANCELLED | OUTPATIENT
Start: 2023-12-18

## 2023-12-04 RX ADMIN — SODIUM CHLORIDE 500 MG: 9 INJECTION, SOLUTION INTRAVENOUS at 12:06

## 2023-12-04 ASSESSMENT — PAIN SCALES - GENERAL: PAINLEVEL: NO PAIN (0)

## 2023-12-05 ENCOUNTER — THERAPY VISIT (OUTPATIENT)
Dept: PHYSICAL THERAPY | Facility: CLINIC | Age: 33
End: 2023-12-05
Payer: COMMERCIAL

## 2023-12-05 DIAGNOSIS — M54.6 ACUTE BILATERAL THORACIC BACK PAIN: Primary | ICD-10-CM

## 2023-12-05 PROCEDURE — 97140 MANUAL THERAPY 1/> REGIONS: CPT | Mod: GP

## 2023-12-12 ENCOUNTER — THERAPY VISIT (OUTPATIENT)
Dept: PHYSICAL THERAPY | Facility: CLINIC | Age: 33
End: 2023-12-12
Payer: COMMERCIAL

## 2023-12-12 DIAGNOSIS — M54.6 ACUTE BILATERAL THORACIC BACK PAIN: Primary | ICD-10-CM

## 2023-12-12 PROCEDURE — 97140 MANUAL THERAPY 1/> REGIONS: CPT | Mod: GP

## 2023-12-18 ENCOUNTER — INFUSION THERAPY VISIT (OUTPATIENT)
Dept: INFUSION THERAPY | Facility: HOSPITAL | Age: 33
End: 2023-12-18
Payer: COMMERCIAL

## 2023-12-18 VITALS
HEART RATE: 78 BPM | DIASTOLIC BLOOD PRESSURE: 60 MMHG | RESPIRATION RATE: 18 BRPM | TEMPERATURE: 97.8 F | OXYGEN SATURATION: 98 % | SYSTOLIC BLOOD PRESSURE: 118 MMHG

## 2023-12-18 DIAGNOSIS — R20.3 HYPERESTHESIA: Primary | ICD-10-CM

## 2023-12-18 PROCEDURE — 258N000003 HC RX IP 258 OP 636: Performed by: PSYCHIATRY & NEUROLOGY

## 2023-12-18 PROCEDURE — 250N000011 HC RX IP 250 OP 636: Performed by: PSYCHIATRY & NEUROLOGY

## 2023-12-18 PROCEDURE — 96365 THER/PROPH/DIAG IV INF INIT: CPT

## 2023-12-18 RX ORDER — HEPARIN SODIUM,PORCINE 10 UNIT/ML
5-20 VIAL (ML) INTRAVENOUS DAILY PRN
Status: CANCELLED | OUTPATIENT
Start: 2024-01-01

## 2023-12-18 RX ORDER — HEPARIN SODIUM (PORCINE) LOCK FLUSH IV SOLN 100 UNIT/ML 100 UNIT/ML
5 SOLUTION INTRAVENOUS
Status: CANCELLED | OUTPATIENT
Start: 2024-01-01

## 2023-12-18 RX ADMIN — SODIUM CHLORIDE 500 MG: 9 INJECTION, SOLUTION INTRAVENOUS at 13:23

## 2023-12-18 NOTE — PROGRESS NOTES
Infusion Nursing Note:  Rose MORLEY Yg presents today for Methylprednisolone infusion.    Patient seen by provider today: No   present during visit today: Not Applicable.    Note: Patient received medication as ordered.      Intravenous Access:  Peripheral IV placed.    Treatment Conditions:  Not Applicable.      Post Infusion Assessment:  Patient tolerated infusion without incident.       Discharge Plan:   Patient discharged in stable condition accompanied by: preethi Gonzalez RN

## 2023-12-19 ENCOUNTER — THERAPY VISIT (OUTPATIENT)
Dept: PHYSICAL THERAPY | Facility: CLINIC | Age: 33
End: 2023-12-19
Payer: COMMERCIAL

## 2023-12-19 DIAGNOSIS — M54.6 ACUTE BILATERAL THORACIC BACK PAIN: Primary | ICD-10-CM

## 2023-12-19 PROCEDURE — 97140 MANUAL THERAPY 1/> REGIONS: CPT | Mod: GP

## 2024-01-02 ENCOUNTER — INFUSION THERAPY VISIT (OUTPATIENT)
Dept: INFUSION THERAPY | Facility: HOSPITAL | Age: 34
End: 2024-01-02
Payer: COMMERCIAL

## 2024-01-02 VITALS
OXYGEN SATURATION: 98 % | RESPIRATION RATE: 18 BRPM | TEMPERATURE: 97.6 F | SYSTOLIC BLOOD PRESSURE: 104 MMHG | DIASTOLIC BLOOD PRESSURE: 59 MMHG | HEART RATE: 90 BPM

## 2024-01-02 DIAGNOSIS — R20.3 HYPERESTHESIA: Primary | ICD-10-CM

## 2024-01-02 PROCEDURE — 96365 THER/PROPH/DIAG IV INF INIT: CPT

## 2024-01-02 PROCEDURE — 250N000011 HC RX IP 250 OP 636: Performed by: PSYCHIATRY & NEUROLOGY

## 2024-01-02 PROCEDURE — 258N000003 HC RX IP 258 OP 636: Performed by: PSYCHIATRY & NEUROLOGY

## 2024-01-02 RX ORDER — HEPARIN SODIUM (PORCINE) LOCK FLUSH IV SOLN 100 UNIT/ML 100 UNIT/ML
5 SOLUTION INTRAVENOUS
Status: CANCELLED | OUTPATIENT
Start: 2024-01-15

## 2024-01-02 RX ORDER — HEPARIN SODIUM,PORCINE 10 UNIT/ML
5-20 VIAL (ML) INTRAVENOUS DAILY PRN
Status: CANCELLED | OUTPATIENT
Start: 2024-01-15

## 2024-01-02 RX ADMIN — SODIUM CHLORIDE 500 MG: 9 INJECTION, SOLUTION INTRAVENOUS at 08:52

## 2024-01-02 ASSESSMENT — PAIN SCALES - GENERAL: PAINLEVEL: NO PAIN (0)

## 2024-01-02 NOTE — PROGRESS NOTES
Infusion Nursing Note:  Rose Ronquillo presents today for Methylprednisolone.    Patient seen by provider today: No   present during visit today: Not Applicable.    Note: Rose arrived ambulatory into the infusion center for Methylprednisolone infusion in a stable condition, she denies any pain or discomfort at this time, VSS. Plan of care reviewed, she verbalized understanding of her plan of care and return to clinic.    Intravenous Access:  Peripheral IV placed.  Treatment Conditions:  Not Applicable.  Post Infusion Assessment:  Patient tolerated infusion without incident.  Site patent and intact, free from redness, edema or discomfort.  No evidence of extravasations.  Access discontinued per protocol.   Discharge Plan:   Discharge instructions reviewed with: Patient.  Patient and/or family verbalized understanding of discharge instructions and all questions answered.  Patient discharged in stable condition accompanied by: self.  Departure Mode: Ambulatory.    Gabriella Dang RN

## 2024-01-09 ENCOUNTER — THERAPY VISIT (OUTPATIENT)
Dept: PHYSICAL THERAPY | Facility: CLINIC | Age: 34
End: 2024-01-09
Payer: COMMERCIAL

## 2024-01-09 DIAGNOSIS — M54.6 ACUTE BILATERAL THORACIC BACK PAIN: Primary | ICD-10-CM

## 2024-01-09 PROCEDURE — 97140 MANUAL THERAPY 1/> REGIONS: CPT | Mod: GP

## 2024-01-09 NOTE — PROGRESS NOTES
PLAN  Continue therapy per current plan of care.    Beginning/End Dates of Progress Note Reporting Period:  10/25/23 to 01/09/2024    Referring Provider:  Wong Culp     01/09/24 0500   Appointment Info   Signing clinician's name / credentials Koki Taylor, PT, DPT   Total/Authorized Visits 42   Visits Used 37   Medical Diagnosis Scheuermann's kyphosis, Possibly Myelopathy   PT Tx Diagnosis pelvic floor hypertonicity   Quick Adds Pelvic Consent   Progress Note/Certification   Therapy Frequency 1x per week   Predicted Duration 12 weeks   Progress Note Due Date 12/06/23   Progress Note Completed Date 10/25/23   GOALS   PT Goals 2   PT Goal 1   Goal Identifier constipation   Goal Description pt will demo decreased constipation, 1 bowel movement daily of stool scale 3-4 to reduce pain in back   Rationale to maximize safety and independence with performance of ADLs and functional tasks   Goal Progress continued constipation, especially after recent fall, pt reports partly d/t restrictions addressed in PT, partly d/t medically complex causes she is addressing with doctors. Continues to benefit from skilled PT for manual abdominal work and PF manual release.   Target Date 01/30/24   Subjective Report   Subjective Report Is feeling tighter this week. Had a fall on the ice getting into car on Saturday which she reports has increased tone throughout body and constipation.   Objective Measures   Objective Measures Objective Measure 1;Objective Measure 2;Objective Measure 3   Objective Measure 1   Objective Measure PF tone   Details hypertonic PF R>L today. TTP external 1st layer. Higher pain with L STP palpation   Objective Measure 2   Objective Measure Diaphragm mobility   Details continued decreased ability to descend with inhalation, R>L restriction with inferior mobilization   Treatment Interventions (PT)   Interventions Manual Therapy;Neuromuscular Re-education;Therapeutic Activity   Manual Therapy   Manual  Therapy: Mobilization, MFR, MLD, friction massage minutes ([id number]) 40   Manual Therapy Manual Therapy 2   Manual Therapy 1 external release of bulbocavernosus R>L, superficial transverse perineal, L>R. Diaphragam release R>L pt supine throughout treatment today. visceral mobility abdominal tissue, decreased mobility with superior>inferior movement. Posterior lateral abdominal mobilization - R>L restriction   Manual Therapy 1 - Details decreased diaphragmatic restriction, increased R flank restriction to mobilization, pt reporting slight pain with mobilizations   Manual Therapy 2 External PF   Manual Therapy 2 - Details Layer 1 PF muscles released externally. Referral pain from L STP to L abdomen   Skilled Intervention identification of restrictions, education on continued diaphragmatic breathing   Patient Response/Progress decreased tone in PF, improved diaphragmatic breathing   Plan   Home program diaphragmatic breathing   Plan for next session continue manual skills, education of pelvic floor contract/ relax   Comments   Comments will continue cross fit and pilates to address strength and motor control. Pt noting increased tolerance to activity, Still becomes symptomatic with activity but less irritable.   Pelvic Health Informed Consent Statement Discussed with patient/guardian reason for referral regarding pelvic health needs and external/internal pelvic floor muscle examination.  Opportunity provided to ask questions and verbal consent for assessment and intervention was given.   Total Session Time   Timed Code Treatment Minutes 40   Total Treatment Time (sum of timed and untimed services) 40

## 2024-01-15 ENCOUNTER — INFUSION THERAPY VISIT (OUTPATIENT)
Dept: INFUSION THERAPY | Facility: HOSPITAL | Age: 34
End: 2024-01-15
Payer: COMMERCIAL

## 2024-01-15 ENCOUNTER — TELEPHONE (OUTPATIENT)
Dept: ORTHOPEDICS | Facility: CLINIC | Age: 34
End: 2024-01-15

## 2024-01-15 VITALS
HEART RATE: 74 BPM | RESPIRATION RATE: 16 BRPM | SYSTOLIC BLOOD PRESSURE: 110 MMHG | DIASTOLIC BLOOD PRESSURE: 59 MMHG | OXYGEN SATURATION: 97 % | TEMPERATURE: 98.1 F

## 2024-01-15 DIAGNOSIS — R20.3 HYPERESTHESIA: Primary | ICD-10-CM

## 2024-01-15 PROCEDURE — 96365 THER/PROPH/DIAG IV INF INIT: CPT

## 2024-01-15 PROCEDURE — 250N000011 HC RX IP 250 OP 636: Performed by: PSYCHIATRY & NEUROLOGY

## 2024-01-15 PROCEDURE — 258N000003 HC RX IP 258 OP 636: Performed by: PSYCHIATRY & NEUROLOGY

## 2024-01-15 RX ORDER — HEPARIN SODIUM (PORCINE) LOCK FLUSH IV SOLN 100 UNIT/ML 100 UNIT/ML
5 SOLUTION INTRAVENOUS
Status: CANCELLED | OUTPATIENT
Start: 2024-01-29

## 2024-01-15 RX ORDER — HEPARIN SODIUM,PORCINE 10 UNIT/ML
5-20 VIAL (ML) INTRAVENOUS DAILY PRN
Status: CANCELLED | OUTPATIENT
Start: 2024-01-29

## 2024-01-15 RX ADMIN — SODIUM CHLORIDE 500 MG: 9 INJECTION, SOLUTION INTRAVENOUS at 11:50

## 2024-01-15 ASSESSMENT — PAIN SCALES - GENERAL: PAINLEVEL: NO PAIN (0)

## 2024-01-16 ENCOUNTER — THERAPY VISIT (OUTPATIENT)
Dept: PHYSICAL THERAPY | Facility: CLINIC | Age: 34
End: 2024-01-16
Payer: COMMERCIAL

## 2024-01-16 DIAGNOSIS — M54.6 ACUTE BILATERAL THORACIC BACK PAIN: Primary | ICD-10-CM

## 2024-01-16 PROCEDURE — 97140 MANUAL THERAPY 1/> REGIONS: CPT | Mod: GP

## 2024-01-23 ENCOUNTER — THERAPY VISIT (OUTPATIENT)
Dept: PHYSICAL THERAPY | Facility: CLINIC | Age: 34
End: 2024-01-23
Payer: COMMERCIAL

## 2024-01-23 DIAGNOSIS — M54.6 ACUTE BILATERAL THORACIC BACK PAIN: Primary | ICD-10-CM

## 2024-01-23 PROCEDURE — 97140 MANUAL THERAPY 1/> REGIONS: CPT | Mod: GP

## 2024-01-29 ENCOUNTER — INFUSION THERAPY VISIT (OUTPATIENT)
Dept: INFUSION THERAPY | Facility: HOSPITAL | Age: 34
End: 2024-01-29
Payer: COMMERCIAL

## 2024-01-29 VITALS
RESPIRATION RATE: 16 BRPM | DIASTOLIC BLOOD PRESSURE: 64 MMHG | HEART RATE: 77 BPM | SYSTOLIC BLOOD PRESSURE: 114 MMHG | OXYGEN SATURATION: 100 % | TEMPERATURE: 97.9 F

## 2024-01-29 DIAGNOSIS — R20.3 HYPERESTHESIA: Primary | ICD-10-CM

## 2024-01-29 PROCEDURE — 258N000003 HC RX IP 258 OP 636: Performed by: PSYCHIATRY & NEUROLOGY

## 2024-01-29 PROCEDURE — 250N000011 HC RX IP 250 OP 636: Performed by: PSYCHIATRY & NEUROLOGY

## 2024-01-29 PROCEDURE — 96365 THER/PROPH/DIAG IV INF INIT: CPT

## 2024-01-29 RX ORDER — HEPARIN SODIUM,PORCINE 10 UNIT/ML
5-20 VIAL (ML) INTRAVENOUS DAILY PRN
Status: CANCELLED | OUTPATIENT
Start: 2024-01-29

## 2024-01-29 RX ORDER — HEPARIN SODIUM (PORCINE) LOCK FLUSH IV SOLN 100 UNIT/ML 100 UNIT/ML
5 SOLUTION INTRAVENOUS
Status: CANCELLED | OUTPATIENT
Start: 2024-01-29

## 2024-01-29 RX ADMIN — SODIUM CHLORIDE 500 MG: 9 INJECTION, SOLUTION INTRAVENOUS at 11:53

## 2024-01-29 NOTE — PROGRESS NOTES
Infusion Nursing Note:  Rose Ronquillo presents today for Methylprednisolone.    Patient seen by provider today: No   present during visit today: Not Applicable.    Note: Rose arrived ambulatory into the infusion center for Methylprednisolone infusion in a stable condition, she denies any pain or discomfort at this time, VSS. Plan of care reviewed, she verbalized understanding of her plan of care and return to clinic.    Intravenous Access:  Peripheral IV placed.  Treatment Conditions:  Not Applicable.  Post Infusion Assessment:  Patient tolerated infusion without incident.  Site patent and intact, free from redness, edema or discomfort.  No evidence of extravasations.  Access discontinued per protocol.   Discharge Plan:   Discharge instructions reviewed with: Patient.  Patient and/or family verbalized understanding of discharge instructions and all questions answered.  Patient discharged in stable condition accompanied by: self.  Departure Mode: Ambulatory.    Nannette Dumas RN

## 2024-01-30 ENCOUNTER — THERAPY VISIT (OUTPATIENT)
Dept: PHYSICAL THERAPY | Facility: CLINIC | Age: 34
End: 2024-01-30
Payer: COMMERCIAL

## 2024-01-30 DIAGNOSIS — M54.6 ACUTE BILATERAL THORACIC BACK PAIN: Primary | ICD-10-CM

## 2024-01-30 PROCEDURE — 97140 MANUAL THERAPY 1/> REGIONS: CPT | Mod: GP

## 2024-02-05 ENCOUNTER — OFFICE VISIT (OUTPATIENT)
Dept: NEUROLOGY | Facility: CLINIC | Age: 34
End: 2024-02-05
Payer: COMMERCIAL

## 2024-02-05 VITALS — DIASTOLIC BLOOD PRESSURE: 73 MMHG | SYSTOLIC BLOOD PRESSURE: 112 MMHG | HEART RATE: 74 BPM

## 2024-02-05 DIAGNOSIS — M35.9 AUTOIMMUNE DISEASE (H): Primary | ICD-10-CM

## 2024-02-05 DIAGNOSIS — Z79.899 ENCOUNTER FOR LONG-TERM (CURRENT) USE OF MEDICATIONS: ICD-10-CM

## 2024-02-05 DIAGNOSIS — R20.3 HYPERESTHESIA: ICD-10-CM

## 2024-02-05 PROCEDURE — 99213 OFFICE O/P EST LOW 20 MIN: CPT | Performed by: PSYCHIATRY & NEUROLOGY

## 2024-02-05 RX ORDER — HEPARIN SODIUM (PORCINE) LOCK FLUSH IV SOLN 100 UNIT/ML 100 UNIT/ML
5 SOLUTION INTRAVENOUS
Status: CANCELLED | OUTPATIENT
Start: 2024-02-12

## 2024-02-05 RX ORDER — ALBUTEROL SULFATE 90 UG/1
1-2 AEROSOL, METERED RESPIRATORY (INHALATION)
Status: CANCELLED
Start: 2024-02-12

## 2024-02-05 RX ORDER — HEPARIN SODIUM,PORCINE 10 UNIT/ML
5-20 VIAL (ML) INTRAVENOUS DAILY PRN
Status: CANCELLED | OUTPATIENT
Start: 2024-02-12

## 2024-02-05 RX ORDER — METHYLPREDNISOLONE SODIUM SUCCINATE 125 MG/2ML
125 INJECTION, POWDER, LYOPHILIZED, FOR SOLUTION INTRAMUSCULAR; INTRAVENOUS
Status: CANCELLED
Start: 2024-02-12

## 2024-02-05 RX ORDER — EPINEPHRINE 1 MG/ML
0.3 INJECTION, SOLUTION, CONCENTRATE INTRAVENOUS EVERY 5 MIN PRN
Status: CANCELLED | OUTPATIENT
Start: 2024-02-12

## 2024-02-05 RX ORDER — ALBUTEROL SULFATE 0.83 MG/ML
2.5 SOLUTION RESPIRATORY (INHALATION)
Status: CANCELLED | OUTPATIENT
Start: 2024-02-12

## 2024-02-05 RX ORDER — DIPHENHYDRAMINE HYDROCHLORIDE 50 MG/ML
50 INJECTION INTRAMUSCULAR; INTRAVENOUS
Status: CANCELLED
Start: 2024-02-12

## 2024-02-05 RX ORDER — MEPERIDINE HYDROCHLORIDE 25 MG/ML
25 INJECTION INTRAMUSCULAR; INTRAVENOUS; SUBCUTANEOUS EVERY 30 MIN PRN
Status: CANCELLED | OUTPATIENT
Start: 2024-02-12

## 2024-02-05 NOTE — LETTER
"    2/5/2024         RE: Rose Ronquillo  [address] [address]        Dear Colleague,    Thank you for referring your patient, Rose Ronquillo, to the [facility] [place]. Please see a copy of my visit note below.    ESTABLISHED PATIENT NEUROLOGY NOTE    DATE OF VISIT: 2/5/2024  MRN: [id number]  PATIENT NAME: Rose Ronquillo  YOB: 1990    Chief Complaint   Patient presents with     Follow Up     Discuss infusion, hoping the go down the dosage.      SUBJECTIVE:                                                      HISTORY OF PRESENT ILLNESS:  Rose is here for follow up regarding weakness and paresthesias of unclear etiology.  Workup for autoimmune diagnosis has been largely unremarkable but from a symptomatic standpoint she has improved with steroid infusions.  We pulled back on the frequency to every other week dosing before her last visit few months ago and then at that time decreased the dose of the infusions.  Please see previous notes for additional historical information.    Rose says she has noticed a little more \"stress sweating.\" She had a migraine after her last infusion which was a new issue/response.    She has some occasional difficulties using the hands in terms of dexterity. She has some trouble with the Left leg at times as well. Nothing new or profoundly different. It still takes her several days to recover from the infusions.      CURRENT MEDICATIONS:   Cholecalciferol (VITAMIN D PO),   gabapentin (NEURONTIN) 100 MG capsule, Take 100 mg by mouth Takes 400mg in the morning  gabapentin (NEURONTIN) 300 MG capsule, Take 300 mg by mouth  ibuprofen (ADVIL,MOTRIN) 200 MG tablet, Take 200 mg by mouth every 4 hours as needed  Ipratropium-Albuterol (COMBIVENT RESPIMAT)  MCG/ACT inhaler, Inhale 1 puff into the lungs 4 times daily Not to exceed 6 doses per day.  sertraline (ZOLOFT) 25 MG tablet, Take 1 tablet by mouth daily at 2 pm  UNABLE TO " FIND, 500 mg every 14 days MEDICATION NAME: Prednisone 500 mg infusion every other week.  VYVANSE 50 MG OR CAPS, 1 CAPSULE DAILY  ZOLOFT 50 MG OR TABS, I tablet daily  amLODIPine (NORVASC) 2.5 MG tablet, TAKE 1 TABLET(2.5 MG) BY MOUTH DAILY (Patient not taking: Reported on 9/11/2023)  Calcium Carbonate Antacid (TUMS PO), Take  by mouth. (Patient not taking: Reported on 2/5/2024)  fish oil-omega-3 fatty acids (OMEGA-3 FISH OIL) 1000 MG capsule,   modafinil (PROVIGIL) 100 MG tablet, Take 1 tablet (100 mg) by mouth daily (Patient not taking: Reported on 10/9/2023)  Multiple Vitamin (MULTI-VITAMIN PO),   nitroGLYcerin (NITRO-BID) 2 % OINT ointment, Place 1 inch (15 mg) onto the skin every 24 hours Apply to affected area ( toes or fingers)  at night and avoid touching eyes or face . (Patient not taking: Reported on 8/28/2023)    No current facility-administered medications on file prior to visit.      RECENT DIAGNOSTIC STUDIES:   Labs: No results found for any visits on 02/05/24.      REVIEW OF SYSTEMS:                                                      10-point review of systems is negative except as mentioned above in HPI.    EXAM:                                                      Physical Exam:   Vitals: /73   Pulse 74   BMI= There is no height or weight on file to calculate BMI.  GENERAL: NAD.  HEENT: NC/AT.  CV: RRR. S1, S2.   NECK: No bruits.  PULM: Non-labored breathing.   Focused Neurologic:  MENTAL STATUS: Alert, attentive. Speech is fluent. Normal comprehension. Normal concentration. Adequate fund of knowledge.   CRANIAL NERVES: Visual fields intact to confrontation. Pupils equally, round and reactive to light. Facial sensation and movement normal. EOM full. Hearing intact to conversation. Trapezius strength intact. Palate moves symmetrically. Tongue midline.  MOTOR: Strength is full in the deltoids, biceps, triceps and hands.  Strength in the legs is again full on today's exam.  Tone and bulk  normal.   DTRs: Intact and symmetric in biceps, BR, patellae.  2+/2 in the upper extremities, slightly brisk at the knees. No crossed adductor response.  This is an improvement.   SENSATION: Normal light touch throughout.  COORDINATION: Normal finger nose finger bilaterally.  STATION AND GAIT: Casual gait is normal.   Right hand-dominant.    ASSESSMENT and PLAN:                                                      Assessment:    ICD-10-CM    1. Autoimmune disease (H24)  M35.9     presumed      2. Hyperesthesia  R20.3       3. Encounter for long-term (current) use of medications  Z79.899           Ms. Ronquillo is a pleasant 33-year-old woman here for follow-up regarding acute onset weakness/paresthesias of unclear etiology.  She continues to be improved on pulse steroids.  She is noticing perhaps more sensitivity to the infusions so we will decrease the strength of the dose of each infusion to 250 mg.  We will continue every other week dosing for the time being.  I would like to see Rose back again in a few months.  She expressed understanding and agreement with the plan.    Plan:  -- I will adjust the infusion orders, decrease in the dose to 250mg. We will continue other week dosing for now.  Keep me posted on your response.  Keep up the good work with staying active.    -- Follow-up in neurology clinic in 4-5 months.  We may want to pull back on the dose frequency in the meantime, pending your response.    Total Time: 25 minutes were spent with the patient and in chart review/documentation (as described above in Assessment and Plan)/coordinating the care on date of service.    Yolande Colvin MD  Neurology    Dragon software used in the dictation of this note.                    Again, thank you for allowing me to participate in the care of your patient.        Sincerely,        Yolande Colvin MD

## 2024-02-05 NOTE — NURSING NOTE
"Rose Ronquillo is a 33 year old female who presents for:  Chief Complaint   Patient presents with    Follow Up     Discuss infusion, hoping the go down the dosage.         Initial Vitals:  /73   Pulse 74  Estimated body mass index is 20.07 kg/m  as calculated from the following:    Height as of 4/19/23: 1.727 m (5' 8\").    Weight as of 5/8/23: 59.9 kg (132 lb).. There is no height or weight on file to calculate BSA. BP completed using cuff size: wrist cuff    Jeronimo PAYTON Field  "

## 2024-02-05 NOTE — PROGRESS NOTES
"ESTABLISHED PATIENT NEUROLOGY NOTE    DATE OF VISIT: 2/5/2024  MRN: [id number]  PATIENT NAME: Rose Ronquillo  YOB: 1990    Chief Complaint   Patient presents with    Follow Up     Discuss infusion, hoping the go down the dosage.      SUBJECTIVE:                                                      HISTORY OF PRESENT ILLNESS:  Rose is here for follow up regarding weakness and paresthesias of unclear etiology.  Workup for autoimmune diagnosis has been largely unremarkable but from a symptomatic standpoint she has improved with steroid infusions.  We pulled back on the frequency to every other week dosing before her last visit few months ago and then at that time decreased the dose of the infusions.  Please see previous notes for additional historical information.    Rose says she has noticed a little more \"stress sweating.\" She had a migraine after her last infusion which was a new issue/response.    She has some occasional difficulties using the hands in terms of dexterity. She has some trouble with the Left leg at times as well. Nothing new or profoundly different. It still takes her several days to recover from the infusions.      CURRENT MEDICATIONS:   Cholecalciferol (VITAMIN D PO),   gabapentin (NEURONTIN) 100 MG capsule, Take 100 mg by mouth Takes 400mg in the morning  gabapentin (NEURONTIN) 300 MG capsule, Take 300 mg by mouth  ibuprofen (ADVIL,MOTRIN) 200 MG tablet, Take 200 mg by mouth every 4 hours as needed  Ipratropium-Albuterol (COMBIVENT RESPIMAT)  MCG/ACT inhaler, Inhale 1 puff into the lungs 4 times daily Not to exceed 6 doses per day.  sertraline (ZOLOFT) 25 MG tablet, Take 1 tablet by mouth daily at 2 pm  UNABLE TO FIND, 500 mg every 14 days MEDICATION NAME: Prednisone 500 mg infusion every other week.  VYVANSE 50 MG OR CAPS, 1 CAPSULE DAILY  ZOLOFT 50 MG OR TABS, I tablet daily  amLODIPine (NORVASC) 2.5 MG tablet, TAKE 1 TABLET(2.5 MG) BY MOUTH DAILY (Patient not taking: " Reported on 9/11/2023)  Calcium Carbonate Antacid (TUMS PO), Take  by mouth. (Patient not taking: Reported on 2/5/2024)  fish oil-omega-3 fatty acids (OMEGA-3 FISH OIL) 1000 MG capsule,   modafinil (PROVIGIL) 100 MG tablet, Take 1 tablet (100 mg) by mouth daily (Patient not taking: Reported on 10/9/2023)  Multiple Vitamin (MULTI-VITAMIN PO),   nitroGLYcerin (NITRO-BID) 2 % OINT ointment, Place 1 inch (15 mg) onto the skin every 24 hours Apply to affected area ( toes or fingers)  at night and avoid touching eyes or face . (Patient not taking: Reported on 8/28/2023)    No current facility-administered medications on file prior to visit.      RECENT DIAGNOSTIC STUDIES:   Labs: No results found for any visits on 02/05/24.      REVIEW OF SYSTEMS:                                                      10-point review of systems is negative except as mentioned above in HPI.    EXAM:                                                      Physical Exam:   Vitals: /73   Pulse 74   BMI= There is no height or weight on file to calculate BMI.  GENERAL: NAD.  HEENT: NC/AT.  CV: RRR. S1, S2.   NECK: No bruits.  PULM: Non-labored breathing.   Focused Neurologic:  MENTAL STATUS: Alert, attentive. Speech is fluent. Normal comprehension. Normal concentration. Adequate fund of knowledge.   CRANIAL NERVES: Visual fields intact to confrontation. Pupils equally, round and reactive to light. Facial sensation and movement normal. EOM full. Hearing intact to conversation. Trapezius strength intact. Palate moves symmetrically. Tongue midline.  MOTOR: Strength is full in the deltoids, biceps, triceps and hands.  Strength in the legs is again full on today's exam.  Tone and bulk normal.   DTRs: Intact and symmetric in biceps, BR, patellae.  2+/2 in the upper extremities, slightly brisk at the knees. No crossed adductor response.  This is an improvement.   SENSATION: Normal light touch throughout.  COORDINATION: Normal finger nose finger  bilaterally.  STATION AND GAIT: Casual gait is normal.   Right hand-dominant.    ASSESSMENT and PLAN:                                                      Assessment:    ICD-10-CM    1. Autoimmune disease (H24)  M35.9     presumed      2. Hyperesthesia  R20.3       3. Encounter for long-term (current) use of medications  Z79.899           Ms. Ronquillo is a pleasant 33-year-old woman here for follow-up regarding acute onset weakness/paresthesias of unclear etiology.  She continues to be improved on pulse steroids.  She is noticing perhaps more sensitivity to the infusions so we will decrease the strength of the dose of each infusion to 250 mg.  We will continue every other week dosing for the time being.  I would like to see Rose back again in a few months.  She expressed understanding and agreement with the plan.    Plan:  -- I will adjust the infusion orders, decrease in the dose to 250mg. We will continue other week dosing for now.  Keep me posted on your response.  Keep up the good work with staying active.    -- Follow-up in neurology clinic in 4-5 months.  We may want to pull back on the dose frequency in the meantime, pending your response.    Total Time: 25 minutes were spent with the patient and in chart review/documentation (as described above in Assessment and Plan)/coordinating the care on date of service.    Yolande Colvin MD  Neurology    Dragon software used in the dictation of this note.

## 2024-02-05 NOTE — PATIENT INSTRUCTIONS
Plan:  -- I will adjust the infusion orders, decrease in the dose to 250mg. We will continue other week dosing for now.  Keep me posted on your response.  Keep up the good work with staying active.    -- Follow-up in neurology clinic in 4-5 months.  We may want to pull back on the dose frequency in the meantime, pending your response.

## 2024-02-06 ENCOUNTER — THERAPY VISIT (OUTPATIENT)
Dept: PHYSICAL THERAPY | Facility: CLINIC | Age: 34
End: 2024-02-06
Payer: COMMERCIAL

## 2024-02-06 DIAGNOSIS — M54.6 ACUTE BILATERAL THORACIC BACK PAIN: Primary | ICD-10-CM

## 2024-02-06 PROCEDURE — 97140 MANUAL THERAPY 1/> REGIONS: CPT | Mod: GP

## 2024-02-12 ENCOUNTER — INFUSION THERAPY VISIT (OUTPATIENT)
Dept: INFUSION THERAPY | Facility: HOSPITAL | Age: 34
End: 2024-02-12
Payer: COMMERCIAL

## 2024-02-12 VITALS
OXYGEN SATURATION: 98 % | TEMPERATURE: 97.4 F | HEART RATE: 87 BPM | DIASTOLIC BLOOD PRESSURE: 57 MMHG | RESPIRATION RATE: 20 BRPM | SYSTOLIC BLOOD PRESSURE: 101 MMHG

## 2024-02-12 DIAGNOSIS — R20.3 HYPERESTHESIA: Primary | ICD-10-CM

## 2024-02-12 PROCEDURE — 96365 THER/PROPH/DIAG IV INF INIT: CPT

## 2024-02-12 PROCEDURE — 96366 THER/PROPH/DIAG IV INF ADDON: CPT

## 2024-02-12 PROCEDURE — 258N000003 HC RX IP 258 OP 636: Performed by: PSYCHIATRY & NEUROLOGY

## 2024-02-12 PROCEDURE — 250N000011 HC RX IP 250 OP 636: Performed by: PSYCHIATRY & NEUROLOGY

## 2024-02-12 RX ORDER — METHYLPREDNISOLONE SODIUM SUCCINATE 125 MG/2ML
125 INJECTION, POWDER, LYOPHILIZED, FOR SOLUTION INTRAMUSCULAR; INTRAVENOUS
Status: DISCONTINUED | OUTPATIENT
Start: 2024-02-12 | End: 2024-02-12 | Stop reason: HOSPADM

## 2024-02-12 RX ORDER — EPINEPHRINE 1 MG/ML
0.3 INJECTION, SOLUTION INTRAMUSCULAR; SUBCUTANEOUS EVERY 5 MIN PRN
Status: DISCONTINUED | OUTPATIENT
Start: 2024-02-12 | End: 2024-02-12 | Stop reason: HOSPADM

## 2024-02-12 RX ORDER — ALBUTEROL SULFATE 0.83 MG/ML
2.5 SOLUTION RESPIRATORY (INHALATION)
Status: DISCONTINUED | OUTPATIENT
Start: 2024-02-12 | End: 2024-02-12 | Stop reason: HOSPADM

## 2024-02-12 RX ORDER — HEPARIN SODIUM,PORCINE 10 UNIT/ML
5-20 VIAL (ML) INTRAVENOUS DAILY PRN
Status: CANCELLED | OUTPATIENT
Start: 2024-02-19

## 2024-02-12 RX ORDER — MEPERIDINE HYDROCHLORIDE 25 MG/ML
25 INJECTION INTRAMUSCULAR; INTRAVENOUS; SUBCUTANEOUS EVERY 30 MIN PRN
Status: CANCELLED | OUTPATIENT
Start: 2024-02-19

## 2024-02-12 RX ORDER — EPINEPHRINE 1 MG/ML
0.3 INJECTION, SOLUTION INTRAMUSCULAR; SUBCUTANEOUS EVERY 5 MIN PRN
Status: CANCELLED | OUTPATIENT
Start: 2024-02-19

## 2024-02-12 RX ORDER — ALBUTEROL SULFATE 90 UG/1
1-2 AEROSOL, METERED RESPIRATORY (INHALATION)
Status: CANCELLED
Start: 2024-02-19

## 2024-02-12 RX ORDER — ALBUTEROL SULFATE 0.83 MG/ML
2.5 SOLUTION RESPIRATORY (INHALATION)
Status: CANCELLED | OUTPATIENT
Start: 2024-02-19

## 2024-02-12 RX ORDER — DIPHENHYDRAMINE HYDROCHLORIDE 50 MG/ML
50 INJECTION INTRAMUSCULAR; INTRAVENOUS
Status: DISCONTINUED | OUTPATIENT
Start: 2024-02-12 | End: 2024-02-12 | Stop reason: HOSPADM

## 2024-02-12 RX ORDER — ALBUTEROL SULFATE 90 UG/1
1-2 AEROSOL, METERED RESPIRATORY (INHALATION)
Status: DISCONTINUED | OUTPATIENT
Start: 2024-02-12 | End: 2024-02-12 | Stop reason: HOSPADM

## 2024-02-12 RX ORDER — DIPHENHYDRAMINE HYDROCHLORIDE 50 MG/ML
50 INJECTION INTRAMUSCULAR; INTRAVENOUS
Status: CANCELLED
Start: 2024-02-19

## 2024-02-12 RX ORDER — HEPARIN SODIUM (PORCINE) LOCK FLUSH IV SOLN 100 UNIT/ML 100 UNIT/ML
5 SOLUTION INTRAVENOUS
Status: CANCELLED | OUTPATIENT
Start: 2024-02-19

## 2024-02-12 RX ORDER — METHYLPREDNISOLONE SODIUM SUCCINATE 125 MG/2ML
125 INJECTION, POWDER, LYOPHILIZED, FOR SOLUTION INTRAMUSCULAR; INTRAVENOUS
Status: CANCELLED
Start: 2024-02-19

## 2024-02-12 RX ORDER — MEPERIDINE HYDROCHLORIDE 25 MG/ML
25 INJECTION INTRAMUSCULAR; INTRAVENOUS; SUBCUTANEOUS EVERY 30 MIN PRN
Status: DISCONTINUED | OUTPATIENT
Start: 2024-02-12 | End: 2024-02-12 | Stop reason: HOSPADM

## 2024-02-12 RX ADMIN — SODIUM CHLORIDE 250 ML: 9 INJECTION, SOLUTION INTRAVENOUS at 12:12

## 2024-02-12 RX ADMIN — SODIUM CHLORIDE 250 MG: 9 INJECTION, SOLUTION INTRAVENOUS at 12:28

## 2024-02-12 ASSESSMENT — PAIN SCALES - GENERAL: PAINLEVEL: NO PAIN (0)

## 2024-02-12 NOTE — PROGRESS NOTES
Infusion Nursing Note:  Rose Ronquillo presents today for Methylprednisolone.    Patient seen by provider today: No   present during visit today: Not Applicable.    Note: Rose arrived ambulatory into the infusion center for Methylprednisolone infusion in a stable condition, she denies any pain or discomfort at this time, VSS. Plan of care reviewed, she verbalized understanding of her plan of care and return to clinic. Her dose is reduced to 250 mg    Intravenous Access:  Peripheral IV placed.  Treatment Conditions:  Not Applicable.  Post Infusion Assessment:  Patient tolerated infusion without incident.  Site patent and intact, free from redness, edema or discomfort.  No evidence of extravasations.  Access discontinued per protocol.   Discharge Plan:   Discharge instructions reviewed with: Patient.  Patient and/or family verbalized understanding of discharge instructions and all questions answered.  Patient discharged in stable condition accompanied by: self.  Departure Mode: Ambulatory.    Gabriella Dang RN

## 2024-02-13 ENCOUNTER — THERAPY VISIT (OUTPATIENT)
Dept: PHYSICAL THERAPY | Facility: CLINIC | Age: 34
End: 2024-02-13
Payer: COMMERCIAL

## 2024-02-13 DIAGNOSIS — M54.6 ACUTE BILATERAL THORACIC BACK PAIN: Primary | ICD-10-CM

## 2024-02-13 PROCEDURE — 97140 MANUAL THERAPY 1/> REGIONS: CPT | Mod: GP

## 2024-02-13 NOTE — PROGRESS NOTES
PLAN  Continue therapy per current plan of care.    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   PT Tx Diagnosis pelvic floor hypertonicity   Quick Adds Pelvic Consent   Progress Note/Certification   Therapy Frequency 1x per week   Predicted Duration 12 weeks   Progress Note Due Date 04/02/24   Progress Note Completed Date 01/09/24   GOALS   PT Goals 2   PT Goal 1   Goal Identifier constipation   Goal Description pt will demo decreased constipation, 1 bowel movement daily of stool scale 3-4 to reduce pain in back   Rationale to maximize safety and independence with performance of ADLs and functional tasks   Goal Progress aggravation of dairy allergy yesterday, has impaired bowel function as of today   Target Date 04/09/24   Subjective Report   Subjective Report Ate food that was contaminated with dairy- has allergy and reports last night had a migraine, abdominal bloating and pains.   Objective Measures   Objective Measures Objective Measure 1;Objective Measure 2;Objective Measure 3   Objective Measure 1   Objective Measure visceral mobility   Details decreased L>R abdominal and large intestine mobility, reproduced nausea with continued mobilization.   Objective Measure 2   Objective Measure pelvic floor tone   Details layer 2,3 hypertonicity L>R   Treatment Interventions (PT)   Interventions Manual Therapy;Neuromuscular Re-education;Therapeutic Activity   Manual Therapy   Manual Therapy: Mobilization, MFR, MLD, friction massage minutes ([id number]) 40   Manual Therapy Manual Therapy 2   Manual Therapy 1 pt supine throughout treatment today. visceral mobility abdominal tissue, decreased mobility with lateral, L>R movement,  Posterior lateral abdominal mobilization - descending colon restriction,   Manual Therapy 1 - Details pt with abodminal bloating and decreased visceral mobility, with mobilization, some nausea which resolved quickly.   Manual Therapy 2 Internal PF   Manual Therapy 2 - Details Layer 1,2,3  PF muscles released internally, layer 2 most hypertonic L.   Skilled Intervention identification of restrictions   Patient Response/Progress mild nausea- resolved quickly, decreased tightness in abdomen and PF.   Plan   Home program continue with pilates and cross fit.   Plan for next session manual   Comments   Comments will continue cross fit and pilates to address strength and motor control. Pt noting increased tolerance to activity, Still becomes symptomatic with activity but less irritable.   Pelvic Health Informed Consent Statement Discussed with patient/guardian reason for referral regarding pelvic health needs and external/internal pelvic floor muscle examination.  Opportunity provided to ask questions and verbal consent for assessment and intervention was given.   Total Session Time   Timed Code Treatment Minutes 40   Total Treatment Time (sum of timed and untimed services) 40

## 2024-02-20 ENCOUNTER — THERAPY VISIT (OUTPATIENT)
Dept: PHYSICAL THERAPY | Facility: CLINIC | Age: 34
End: 2024-02-20
Payer: COMMERCIAL

## 2024-02-20 DIAGNOSIS — M54.6 ACUTE BILATERAL THORACIC BACK PAIN: Primary | ICD-10-CM

## 2024-02-20 PROCEDURE — 97140 MANUAL THERAPY 1/> REGIONS: CPT | Mod: GP

## 2024-02-22 DIAGNOSIS — R20.3 HYPERESTHESIA: Primary | ICD-10-CM

## 2024-02-22 RX ORDER — HEPARIN SODIUM (PORCINE) LOCK FLUSH IV SOLN 100 UNIT/ML 100 UNIT/ML
5 SOLUTION INTRAVENOUS
Status: CANCELLED | OUTPATIENT
Start: 2024-02-26

## 2024-02-22 RX ORDER — ALBUTEROL SULFATE 90 UG/1
1-2 AEROSOL, METERED RESPIRATORY (INHALATION)
Status: CANCELLED
Start: 2024-02-26

## 2024-02-22 RX ORDER — HEPARIN SODIUM,PORCINE 10 UNIT/ML
5-20 VIAL (ML) INTRAVENOUS DAILY PRN
Status: CANCELLED | OUTPATIENT
Start: 2024-02-26

## 2024-02-22 RX ORDER — EPINEPHRINE 1 MG/ML
0.3 INJECTION, SOLUTION, CONCENTRATE INTRAVENOUS EVERY 5 MIN PRN
Status: CANCELLED | OUTPATIENT
Start: 2024-02-26

## 2024-02-22 RX ORDER — MEPERIDINE HYDROCHLORIDE 25 MG/ML
25 INJECTION INTRAMUSCULAR; INTRAVENOUS; SUBCUTANEOUS EVERY 30 MIN PRN
Status: CANCELLED | OUTPATIENT
Start: 2024-02-26

## 2024-02-22 RX ORDER — DIPHENHYDRAMINE HYDROCHLORIDE 50 MG/ML
50 INJECTION INTRAMUSCULAR; INTRAVENOUS
Status: CANCELLED
Start: 2024-02-26

## 2024-02-22 RX ORDER — METHYLPREDNISOLONE SODIUM SUCCINATE 125 MG/2ML
125 INJECTION, POWDER, LYOPHILIZED, FOR SOLUTION INTRAMUSCULAR; INTRAVENOUS
Status: CANCELLED
Start: 2024-02-26

## 2024-02-22 RX ORDER — ALBUTEROL SULFATE 0.83 MG/ML
2.5 SOLUTION RESPIRATORY (INHALATION)
Status: CANCELLED | OUTPATIENT
Start: 2024-02-26

## 2024-02-26 ENCOUNTER — INFUSION THERAPY VISIT (OUTPATIENT)
Dept: INFUSION THERAPY | Facility: HOSPITAL | Age: 34
End: 2024-02-26
Attending: PSYCHIATRY & NEUROLOGY
Payer: COMMERCIAL

## 2024-02-26 VITALS
HEART RATE: 88 BPM | OXYGEN SATURATION: 98 % | RESPIRATION RATE: 18 BRPM | TEMPERATURE: 98.2 F | SYSTOLIC BLOOD PRESSURE: 93 MMHG | DIASTOLIC BLOOD PRESSURE: 64 MMHG

## 2024-02-26 DIAGNOSIS — R20.3 HYPERESTHESIA: Primary | ICD-10-CM

## 2024-02-26 PROCEDURE — 250N000011 HC RX IP 250 OP 636: Performed by: PSYCHIATRY & NEUROLOGY

## 2024-02-26 PROCEDURE — 258N000003 HC RX IP 258 OP 636: Performed by: PSYCHIATRY & NEUROLOGY

## 2024-02-26 PROCEDURE — 96365 THER/PROPH/DIAG IV INF INIT: CPT

## 2024-02-26 RX ORDER — ALBUTEROL SULFATE 0.83 MG/ML
2.5 SOLUTION RESPIRATORY (INHALATION)
Status: CANCELLED | OUTPATIENT
Start: 2024-03-11

## 2024-02-26 RX ORDER — HEPARIN SODIUM,PORCINE 10 UNIT/ML
5-20 VIAL (ML) INTRAVENOUS DAILY PRN
Status: CANCELLED | OUTPATIENT
Start: 2024-03-11

## 2024-02-26 RX ORDER — MEPERIDINE HYDROCHLORIDE 25 MG/ML
25 INJECTION INTRAMUSCULAR; INTRAVENOUS; SUBCUTANEOUS EVERY 30 MIN PRN
Status: DISCONTINUED | OUTPATIENT
Start: 2024-02-26 | End: 2024-02-26 | Stop reason: HOSPADM

## 2024-02-26 RX ORDER — ALBUTEROL SULFATE 90 UG/1
1-2 AEROSOL, METERED RESPIRATORY (INHALATION)
Status: CANCELLED
Start: 2024-03-11

## 2024-02-26 RX ORDER — ALBUTEROL SULFATE 90 UG/1
1-2 AEROSOL, METERED RESPIRATORY (INHALATION)
Status: DISCONTINUED | OUTPATIENT
Start: 2024-02-26 | End: 2024-02-26 | Stop reason: HOSPADM

## 2024-02-26 RX ORDER — METHYLPREDNISOLONE SODIUM SUCCINATE 125 MG/2ML
125 INJECTION, POWDER, LYOPHILIZED, FOR SOLUTION INTRAMUSCULAR; INTRAVENOUS
Status: CANCELLED
Start: 2024-03-11

## 2024-02-26 RX ORDER — DIPHENHYDRAMINE HYDROCHLORIDE 50 MG/ML
50 INJECTION INTRAMUSCULAR; INTRAVENOUS
Status: CANCELLED
Start: 2024-03-11

## 2024-02-26 RX ORDER — METHYLPREDNISOLONE SODIUM SUCCINATE 125 MG/2ML
125 INJECTION, POWDER, LYOPHILIZED, FOR SOLUTION INTRAMUSCULAR; INTRAVENOUS
Status: DISCONTINUED | OUTPATIENT
Start: 2024-02-26 | End: 2024-02-26 | Stop reason: HOSPADM

## 2024-02-26 RX ORDER — DIPHENHYDRAMINE HYDROCHLORIDE 50 MG/ML
50 INJECTION INTRAMUSCULAR; INTRAVENOUS
Status: DISCONTINUED | OUTPATIENT
Start: 2024-02-26 | End: 2024-02-26 | Stop reason: HOSPADM

## 2024-02-26 RX ORDER — EPINEPHRINE 1 MG/ML
0.3 INJECTION, SOLUTION INTRAMUSCULAR; SUBCUTANEOUS EVERY 5 MIN PRN
Status: DISCONTINUED | OUTPATIENT
Start: 2024-02-26 | End: 2024-02-26 | Stop reason: HOSPADM

## 2024-02-26 RX ORDER — MEPERIDINE HYDROCHLORIDE 25 MG/ML
25 INJECTION INTRAMUSCULAR; INTRAVENOUS; SUBCUTANEOUS EVERY 30 MIN PRN
Status: CANCELLED | OUTPATIENT
Start: 2024-03-11

## 2024-02-26 RX ORDER — ALBUTEROL SULFATE 0.83 MG/ML
2.5 SOLUTION RESPIRATORY (INHALATION)
Status: DISCONTINUED | OUTPATIENT
Start: 2024-02-26 | End: 2024-02-26 | Stop reason: HOSPADM

## 2024-02-26 RX ORDER — EPINEPHRINE 1 MG/ML
0.3 INJECTION, SOLUTION INTRAMUSCULAR; SUBCUTANEOUS EVERY 5 MIN PRN
Status: CANCELLED | OUTPATIENT
Start: 2024-03-11

## 2024-02-26 RX ORDER — HEPARIN SODIUM (PORCINE) LOCK FLUSH IV SOLN 100 UNIT/ML 100 UNIT/ML
5 SOLUTION INTRAVENOUS
Status: CANCELLED | OUTPATIENT
Start: 2024-03-11

## 2024-02-26 RX ADMIN — SODIUM CHLORIDE 250 ML: 9 INJECTION, SOLUTION INTRAVENOUS at 12:06

## 2024-02-26 RX ADMIN — SODIUM CHLORIDE 250 MG: 9 INJECTION, SOLUTION INTRAVENOUS at 12:24

## 2024-03-05 ENCOUNTER — THERAPY VISIT (OUTPATIENT)
Dept: PHYSICAL THERAPY | Facility: CLINIC | Age: 34
End: 2024-03-05

## 2024-03-05 DIAGNOSIS — M54.6 ACUTE BILATERAL THORACIC BACK PAIN: Primary | ICD-10-CM

## 2024-03-05 PROCEDURE — 97140 MANUAL THERAPY 1/> REGIONS: CPT | Mod: GP

## 2024-03-11 ENCOUNTER — INFUSION THERAPY VISIT (OUTPATIENT)
Dept: INFUSION THERAPY | Facility: HOSPITAL | Age: 34
End: 2024-03-11
Attending: PSYCHIATRY & NEUROLOGY

## 2024-03-11 VITALS
DIASTOLIC BLOOD PRESSURE: 61 MMHG | TEMPERATURE: 97.6 F | HEART RATE: 81 BPM | SYSTOLIC BLOOD PRESSURE: 90 MMHG | OXYGEN SATURATION: 97 % | RESPIRATION RATE: 16 BRPM

## 2024-03-11 DIAGNOSIS — R20.3 HYPERESTHESIA: Primary | ICD-10-CM

## 2024-03-11 PROCEDURE — 96365 THER/PROPH/DIAG IV INF INIT: CPT

## 2024-03-11 PROCEDURE — 250N000011 HC RX IP 250 OP 636: Performed by: PSYCHIATRY & NEUROLOGY

## 2024-03-11 PROCEDURE — 258N000003 HC RX IP 258 OP 636: Performed by: PSYCHIATRY & NEUROLOGY

## 2024-03-11 RX ORDER — HEPARIN SODIUM (PORCINE) LOCK FLUSH IV SOLN 100 UNIT/ML 100 UNIT/ML
5 SOLUTION INTRAVENOUS
Status: DISCONTINUED | OUTPATIENT
Start: 2024-03-11 | End: 2024-03-11 | Stop reason: HOSPADM

## 2024-03-11 RX ORDER — HEPARIN SODIUM,PORCINE 10 UNIT/ML
5-20 VIAL (ML) INTRAVENOUS DAILY PRN
Status: CANCELLED | OUTPATIENT
Start: 2024-03-25

## 2024-03-11 RX ORDER — HEPARIN SODIUM (PORCINE) LOCK FLUSH IV SOLN 100 UNIT/ML 100 UNIT/ML
5 SOLUTION INTRAVENOUS
Status: CANCELLED | OUTPATIENT
Start: 2024-03-25

## 2024-03-11 RX ORDER — HEPARIN SODIUM,PORCINE 10 UNIT/ML
5-20 VIAL (ML) INTRAVENOUS DAILY PRN
Status: DISCONTINUED | OUTPATIENT
Start: 2024-03-11 | End: 2024-03-11 | Stop reason: HOSPADM

## 2024-03-11 RX ADMIN — SODIUM CHLORIDE: 9 INJECTION, SOLUTION INTRAVENOUS at 12:32

## 2024-03-11 NOTE — PROGRESS NOTES
Infusion Nursing Note:  Rose Ronquillo presents today for Methylprednisolone.    Patient seen by provider today: No   present during visit today: Not Applicable.    Note: Rose arrived ambulatory into the infusion center for Methylprednisolone infusion in a stable condition, she denies any pain or discomfort at this time, VSS. Rose does say that her grasping of things is not as good now that she is on the 250 mg of Methylprednisolone. Plan of care reviewed, she verbalized understanding of her plan of care and return to clinic.    Intravenous Access:  Peripheral IV placed.    Treatment Conditions:  Not Applicable.    Post Infusion Assessment:  Patient tolerated infusion without incident.  Site patent and intact, free from redness, edema or discomfort.  No evidence of extravasations.  Access discontinued per protocol.     Discharge Plan:   Discharge instructions reviewed with: Patient.  Patient and/or family verbalized understanding of discharge instructions and all questions answered.  Patient discharged in stable condition accompanied by: self.  Departure Mode: Ambulatory.    Nannette Dumas RN

## 2024-03-19 ENCOUNTER — THERAPY VISIT (OUTPATIENT)
Dept: PHYSICAL THERAPY | Facility: CLINIC | Age: 34
End: 2024-03-19

## 2024-03-19 DIAGNOSIS — M54.6 ACUTE BILATERAL THORACIC BACK PAIN: Primary | ICD-10-CM

## 2024-03-19 PROCEDURE — 97140 MANUAL THERAPY 1/> REGIONS: CPT | Mod: GP

## 2024-03-25 ENCOUNTER — INFUSION THERAPY VISIT (OUTPATIENT)
Dept: INFUSION THERAPY | Facility: HOSPITAL | Age: 34
End: 2024-03-25
Attending: PSYCHIATRY & NEUROLOGY

## 2024-03-25 VITALS
RESPIRATION RATE: 16 BRPM | SYSTOLIC BLOOD PRESSURE: 100 MMHG | OXYGEN SATURATION: 98 % | HEART RATE: 87 BPM | TEMPERATURE: 98.1 F | DIASTOLIC BLOOD PRESSURE: 63 MMHG

## 2024-03-25 DIAGNOSIS — R20.3 HYPERESTHESIA: Primary | ICD-10-CM

## 2024-03-25 PROCEDURE — 258N000003 HC RX IP 258 OP 636: Performed by: PSYCHIATRY & NEUROLOGY

## 2024-03-25 PROCEDURE — 250N000011 HC RX IP 250 OP 636: Performed by: PSYCHIATRY & NEUROLOGY

## 2024-03-25 PROCEDURE — 96365 THER/PROPH/DIAG IV INF INIT: CPT

## 2024-03-25 RX ADMIN — SODIUM CHLORIDE: 9 INJECTION, SOLUTION INTRAVENOUS at 11:49

## 2024-03-25 NOTE — PROGRESS NOTES
Infusion Nursing Note:  Rose Ronquillo presents today for Methylprednisolone.    Patient seen by provider today: No   present during visit today: Not Applicable.    Note: Per Pharmacy, OK to run Methylprednisolone over 30min.       Intravenous Access:  Peripheral IV placed.    Treatment Conditions:  Not Applicable.      Post Infusion Assessment:  Patient tolerated infusion without incident.  Site patent and intact, free from redness, edema or discomfort.  Access discontinued per protocol.       Discharge Plan:   Patient discharged in stable condition accompanied by: self.  Departure Mode: Ambulatory.      Cristina Charles RN

## 2024-03-26 ENCOUNTER — THERAPY VISIT (OUTPATIENT)
Dept: PHYSICAL THERAPY | Facility: CLINIC | Age: 34
End: 2024-03-26

## 2024-03-26 DIAGNOSIS — M54.6 ACUTE BILATERAL THORACIC BACK PAIN: Primary | ICD-10-CM

## 2024-03-26 PROCEDURE — 97140 MANUAL THERAPY 1/> REGIONS: CPT | Mod: GP

## 2024-04-02 ENCOUNTER — THERAPY VISIT (OUTPATIENT)
Dept: PHYSICAL THERAPY | Facility: CLINIC | Age: 34
End: 2024-04-02

## 2024-04-02 DIAGNOSIS — M54.6 CHRONIC BILATERAL THORACIC BACK PAIN: Primary | ICD-10-CM

## 2024-04-02 DIAGNOSIS — R10.2 PELVIC PAIN IN FEMALE: ICD-10-CM

## 2024-04-02 DIAGNOSIS — G89.29 CHRONIC BILATERAL THORACIC BACK PAIN: Primary | ICD-10-CM

## 2024-04-02 DIAGNOSIS — M62.838 MUSCLE SPASM: ICD-10-CM

## 2024-04-02 PROCEDURE — 97140 MANUAL THERAPY 1/> REGIONS: CPT | Mod: GP

## 2024-04-02 NOTE — PROGRESS NOTES
PLAN  Continue therapy per current plan of care.    Beginning/End Dates of Progress Note Reporting Period:  02/13/24 to 04/02/2024    Referring Provider:  Wong Culp     04/02/24 0500   Appointment Info   Signing clinician's name / credentials Samantha Rucker, PT, DPT, WCS   Total/Authorized Visits 52   Visits Used 47   Medical Diagnosis Scheuermann's kyphosis, Possibly Myelopathy   PT Tx Diagnosis pelvic floor hypertonicity   Quick Adds Pelvic Consent   Progress Note/Certification   Therapy Frequency 1x per week   Predicted Duration 6 additional wks   Progress Note Due Date 05/14/24   Progress Note Completed Date 02/13/24   GOALS   PT Goals 2   PT Goal 1   Goal Identifier constipation   Goal Description pt will demo decreased constipation, 1 bowel movement daily of stool scale 3-4 to reduce pain in back   Rationale to maximize safety and independence with performance of ADLs and functional tasks   Goal Progress Currently has 3-6 BMs a day, stool consistency varies from [place] stool type 1-6. More time needed to meet goals   Target Date 04/09/24   PT Goal 2   Goal Identifier PFM Coordination   Goal Description Pt will improve PFM coordination to demonstrate full relaxation following contraction, assessed via internal vaginal palpation or biofeedback   Rationale   (For proper muscle coordination to reduce pain and promote functional voiding)   Goal Progress Unable to relax following PFM contraction today   Target Date 05/14/24   Subjective Report   Subjective Report Reports feeling tight in rectus abd and paraspinals today. Notes posterior pelvic pain around anus/coccyx. Denies pain w/ urination or BMs. Has 3-6 BMs a day and stool consistency is variable   Objective Measures   Objective Measures Objective Measure 1;Objective Measure 2;Objective Measure 3   Objective Measure 1   Objective Measure Internal vaginal exam   Details Tightness and TTP in L>R LA and OI. 4/5 strength, 10 second hold, unable to relax  "following contraction   Objective Measure 2   Objective Measure Abdominal palpation   Details Tightness and myofascial restrictions in upper abdominals and rectus abdominis   Objective Measure 3   Objective Measure Paraspinals/QL palpation   Details Tightness and TTP in B lumbar paraspinals and QL   Treatment Interventions (PT)   Interventions Manual Therapy;Therapeutic Activity   Therapeutic Activity   Ther Act 1 Education   Ther Act 1 - Details During manual work reeducated in abdominal massage and discussed use of squatty potty to promote puborectalis relaxation during voids. Discussed trying pelvic wand in standing d/t discomfort using in supine   Patient Response/Progress All questions answered, pt verbalized understanding   Manual Therapy   Manual Therapy: Mobilization, MFR, MLD, friction massage minutes ([id number]) 38   Manual Therapy Manual Therapy 2;Manual Therapy 3   Manual Therapy 1 Supine abdominal x14 min total   Manual Therapy 1 - Details TPR to upper rectus abdominal, MFR to upper abdominals and over large intestine using stacking technique and tractioning. Skin rolling to abdominals   Manual Therapy 2 Internal PF x10 min total   Manual Therapy 2 - Details STM/TPR to L>R LA and OI. Contract-relax 5\"x5 to B OI internally   Manual Therapy 3 Prone lumbar/QL x14 min total   Manual Therapy 3 - Details STM/TPR to B lumbar paraspinals and QL   Skilled Intervention To reduce muscle tightness and myofascial restrictions contributing to pain and limited mobility   Patient Response/Progress No adverse effects. Decreased pain and increased ROM post. Increased bowel sounds following abdominal/lumbar work   Plan   Home program continue with pilates and cross fit.   Updates to plan of care Discussed using squatty potty and modifying position using pelvic wand   Plan for next session Continue w/ manual   Comments   Comments will continue cross fit and pilates to address strength and motor control. Pt noting increased " tolerance to activity, will begin alternating appts in [place]   Pelvic Health Informed Consent Statement Discussed with patient/guardian reason for referral regarding pelvic health needs and external/internal pelvic floor muscle examination.  Opportunity provided to ask questions and verbal consent for assessment and intervention was given.   Total Session Time   Timed Code Treatment Minutes 38   Total Treatment Time (sum of timed and untimed services) 38

## 2024-04-08 ENCOUNTER — INFUSION THERAPY VISIT (OUTPATIENT)
Dept: INFUSION THERAPY | Facility: HOSPITAL | Age: 34
End: 2024-04-08
Attending: PSYCHIATRY & NEUROLOGY

## 2024-04-08 VITALS
SYSTOLIC BLOOD PRESSURE: 104 MMHG | TEMPERATURE: 97.8 F | HEART RATE: 68 BPM | DIASTOLIC BLOOD PRESSURE: 61 MMHG | RESPIRATION RATE: 16 BRPM | OXYGEN SATURATION: 98 %

## 2024-04-08 DIAGNOSIS — R20.3 HYPERESTHESIA: Primary | ICD-10-CM

## 2024-04-08 PROCEDURE — 96365 THER/PROPH/DIAG IV INF INIT: CPT

## 2024-04-08 PROCEDURE — 250N000011 HC RX IP 250 OP 636: Performed by: PSYCHIATRY & NEUROLOGY

## 2024-04-08 PROCEDURE — 258N000003 HC RX IP 258 OP 636: Performed by: PSYCHIATRY & NEUROLOGY

## 2024-04-08 RX ADMIN — SODIUM CHLORIDE: 9 INJECTION, SOLUTION INTRAVENOUS at 12:27

## 2024-04-08 ASSESSMENT — PAIN SCALES - GENERAL: PAINLEVEL: NO PAIN (0)

## 2024-04-08 NOTE — PROGRESS NOTES
Infusion Nursing Note:  Rose Ronquillo presents today for Methylprednisolone.    Patient seen by provider today: No   present during visit today: Not Applicable.    Note: Rose arrived ambulatory into the infusion center for Methylprednisolone infusion in a stable condition, she denies any pain or discomfort at this time, VSS. Plan of care and medication reviewed, she verbalized understanding of her plan of care and return to clinic.   Intravenous Access:  Peripheral IV placed.  Treatment Conditions:  Not Applicable.  Post Infusion Assessment:  Patient tolerated infusion without incident.  Site patent and intact, free from redness, edema or discomfort.  No evidence of extravasations.  Access discontinued per protocol.   Discharge Plan:   Discharge instructions reviewed with: Patient.  Patient and/or family verbalized understanding of discharge instructions and all questions answered.  Patient discharged in stable condition accompanied by: self.  Departure Mode: Ambulatory.    Gabriella Dang RN

## 2024-04-16 ENCOUNTER — THERAPY VISIT (OUTPATIENT)
Dept: PHYSICAL THERAPY | Facility: CLINIC | Age: 34
End: 2024-04-16

## 2024-04-16 DIAGNOSIS — G89.29 CHRONIC BILATERAL THORACIC BACK PAIN: Primary | ICD-10-CM

## 2024-04-16 DIAGNOSIS — R10.2 PELVIC PAIN IN FEMALE: ICD-10-CM

## 2024-04-16 DIAGNOSIS — M54.6 CHRONIC BILATERAL THORACIC BACK PAIN: Primary | ICD-10-CM

## 2024-04-16 DIAGNOSIS — M62.838 MUSCLE SPASM: ICD-10-CM

## 2024-04-16 PROCEDURE — 97530 THERAPEUTIC ACTIVITIES: CPT | Mod: GP

## 2024-04-22 ENCOUNTER — INFUSION THERAPY VISIT (OUTPATIENT)
Dept: INFUSION THERAPY | Facility: HOSPITAL | Age: 34
End: 2024-04-22
Attending: PSYCHIATRY & NEUROLOGY

## 2024-04-22 VITALS
RESPIRATION RATE: 18 BRPM | TEMPERATURE: 97.8 F | OXYGEN SATURATION: 98 % | HEART RATE: 91 BPM | SYSTOLIC BLOOD PRESSURE: 116 MMHG | DIASTOLIC BLOOD PRESSURE: 58 MMHG

## 2024-04-22 DIAGNOSIS — R20.3 HYPERESTHESIA: Primary | ICD-10-CM

## 2024-04-22 PROCEDURE — 96365 THER/PROPH/DIAG IV INF INIT: CPT

## 2024-04-22 PROCEDURE — 250N000011 HC RX IP 250 OP 636: Performed by: PSYCHIATRY & NEUROLOGY

## 2024-04-22 PROCEDURE — 258N000003 HC RX IP 258 OP 636: Performed by: PSYCHIATRY & NEUROLOGY

## 2024-04-22 RX ADMIN — SODIUM CHLORIDE: 9 INJECTION, SOLUTION INTRAVENOUS at 11:43

## 2024-04-22 NOTE — PROGRESS NOTES
Infusion Nursing Note:  Rose Ronquillo presents today for Methylprednisolone.    Patient seen by provider today: No   present during visit today: Not Applicable.    Note: Rose arrived ambulatory into the infusion center for Methylprednisolone infusion in a stable condition, she denies any pain or discomfort at this time, VSS. Rose does say that her grasping of things is not as good now that she is on the 250 mg of Methylprednisolone. Plan of care reviewed, she verbalized understanding of her plan of care and return to clinic. Rose is going to [facility] and will not return for an appointment until 05/20/24.    Intravenous Access:  Peripheral IV placed.    Treatment Conditions:  Not Applicable.    Post Infusion Assessment:  Patient tolerated infusion without incident.  Site patent and intact, free from redness, edema or discomfort.  No evidence of extravasations.  Access discontinued per protocol.     Discharge Plan:   Discharge instructions reviewed with: Patient.  Patient and/or family verbalized understanding of discharge instructions and all questions answered.  Patient discharged in stable condition accompanied by: self.  Departure Mode: Ambulatory.    Nannette Dumas RN

## 2024-05-20 ENCOUNTER — INFUSION THERAPY VISIT (OUTPATIENT)
Dept: INFUSION THERAPY | Facility: HOSPITAL | Age: 34
End: 2024-05-20
Attending: PSYCHIATRY & NEUROLOGY
Payer: COMMERCIAL

## 2024-05-20 VITALS
RESPIRATION RATE: 16 BRPM | TEMPERATURE: 98.2 F | SYSTOLIC BLOOD PRESSURE: 107 MMHG | OXYGEN SATURATION: 99 % | DIASTOLIC BLOOD PRESSURE: 55 MMHG | HEART RATE: 68 BPM

## 2024-05-20 DIAGNOSIS — R20.3 HYPERESTHESIA: Primary | ICD-10-CM

## 2024-05-20 PROCEDURE — 250N000011 HC RX IP 250 OP 636: Performed by: PSYCHIATRY & NEUROLOGY

## 2024-05-20 PROCEDURE — 258N000003 HC RX IP 258 OP 636: Performed by: PSYCHIATRY & NEUROLOGY

## 2024-05-20 PROCEDURE — 96365 THER/PROPH/DIAG IV INF INIT: CPT

## 2024-05-20 RX ADMIN — SODIUM CHLORIDE: 9 INJECTION, SOLUTION INTRAVENOUS at 12:18

## 2024-05-20 ASSESSMENT — PAIN SCALES - GENERAL: PAINLEVEL: NO PAIN (0)

## 2024-05-20 NOTE — PROGRESS NOTES
Infusion Nursing Note:  Rose Ronquillo presents today for Methylprednisolone.    Patient seen by provider today: No   present during visit today: Not Applicable.    Note: Rose arrived ambulatory into the infusion center for Methylprednisolone infusion in a stable condition, she denies any pain or discomfort at this time, VSS. She just came back from [place] and have cold. Plan of care and medication reviewed, she verbalized understanding of her plan of care and return to clinic.   Intravenous Access:  Peripheral IV placed.  Treatment Conditions:  Not Applicable.  Post Infusion Assessment:  Patient tolerated infusion without incident.  Site patent and intact, free from redness, edema or discomfort.  No evidence of extravasations.  Access discontinued per protocol.   Discharge Plan:   Discharge instructions reviewed with: Patient.  Patient and/or family verbalized understanding of discharge instructions and all questions answered.  Patient discharged in stable condition accompanied by: self.  Departure Mode: Ambulatory.    Gabriella Dang RN   Pt given urinal to urinate. Pt aware of plan to be admitted for observation and verbalized understanding.

## 2024-05-29 ENCOUNTER — TELEPHONE (OUTPATIENT)
Dept: ORTHOPEDICS | Facility: CLINIC | Age: 34
End: 2024-05-29
Payer: COMMERCIAL

## 2024-05-29 NOTE — TELEPHONE ENCOUNTER
Patient confirmed scheduled appointment:  Date: 8/1  Time: 7:30  Visit type: Return surgical spine  Provider: Dr. Culp

## 2024-06-03 ENCOUNTER — INFUSION THERAPY VISIT (OUTPATIENT)
Dept: INFUSION THERAPY | Facility: HOSPITAL | Age: 34
End: 2024-06-03
Attending: PSYCHIATRY & NEUROLOGY
Payer: COMMERCIAL

## 2024-06-03 VITALS
OXYGEN SATURATION: 97 % | HEART RATE: 74 BPM | TEMPERATURE: 97.8 F | SYSTOLIC BLOOD PRESSURE: 105 MMHG | DIASTOLIC BLOOD PRESSURE: 55 MMHG | RESPIRATION RATE: 16 BRPM

## 2024-06-03 DIAGNOSIS — R20.3 HYPERESTHESIA: Primary | ICD-10-CM

## 2024-06-03 PROCEDURE — 250N000011 HC RX IP 250 OP 636: Performed by: PSYCHIATRY & NEUROLOGY

## 2024-06-03 PROCEDURE — 96365 THER/PROPH/DIAG IV INF INIT: CPT

## 2024-06-03 PROCEDURE — 258N000003 HC RX IP 258 OP 636: Performed by: PSYCHIATRY & NEUROLOGY

## 2024-06-03 RX ORDER — HEPARIN SODIUM,PORCINE 10 UNIT/ML
5-20 VIAL (ML) INTRAVENOUS DAILY PRN
Status: CANCELLED | OUTPATIENT
Start: 2024-06-03

## 2024-06-03 RX ORDER — ALBUTEROL SULFATE 0.83 MG/ML
2.5 SOLUTION RESPIRATORY (INHALATION)
Status: CANCELLED | OUTPATIENT
Start: 2024-06-17

## 2024-06-03 RX ORDER — HEPARIN SODIUM (PORCINE) LOCK FLUSH IV SOLN 100 UNIT/ML 100 UNIT/ML
5 SOLUTION INTRAVENOUS
Status: CANCELLED | OUTPATIENT
Start: 2024-06-03

## 2024-06-03 RX ORDER — MEPERIDINE HYDROCHLORIDE 25 MG/ML
25 INJECTION INTRAMUSCULAR; INTRAVENOUS; SUBCUTANEOUS EVERY 30 MIN PRN
Status: DISCONTINUED | OUTPATIENT
Start: 2024-06-03 | End: 2024-06-03

## 2024-06-03 RX ORDER — DIPHENHYDRAMINE HYDROCHLORIDE 50 MG/ML
50 INJECTION INTRAMUSCULAR; INTRAVENOUS
Status: DISCONTINUED | OUTPATIENT
Start: 2024-06-03 | End: 2024-06-03

## 2024-06-03 RX ORDER — ALBUTEROL SULFATE 0.83 MG/ML
2.5 SOLUTION RESPIRATORY (INHALATION)
Status: DISCONTINUED | OUTPATIENT
Start: 2024-06-03 | End: 2024-06-03

## 2024-06-03 RX ORDER — MEPERIDINE HYDROCHLORIDE 25 MG/ML
25 INJECTION INTRAMUSCULAR; INTRAVENOUS; SUBCUTANEOUS EVERY 30 MIN PRN
Status: CANCELLED | OUTPATIENT
Start: 2024-06-17

## 2024-06-03 RX ORDER — METHYLPREDNISOLONE SODIUM SUCCINATE 125 MG/2ML
125 INJECTION, POWDER, LYOPHILIZED, FOR SOLUTION INTRAMUSCULAR; INTRAVENOUS
Status: DISCONTINUED | OUTPATIENT
Start: 2024-06-03 | End: 2024-06-03

## 2024-06-03 RX ORDER — METHYLPREDNISOLONE SODIUM SUCCINATE 125 MG/2ML
125 INJECTION, POWDER, LYOPHILIZED, FOR SOLUTION INTRAMUSCULAR; INTRAVENOUS
Status: CANCELLED
Start: 2024-06-17

## 2024-06-03 RX ORDER — ALBUTEROL SULFATE 90 UG/1
1-2 AEROSOL, METERED RESPIRATORY (INHALATION)
Status: DISCONTINUED | OUTPATIENT
Start: 2024-06-03 | End: 2024-06-03

## 2024-06-03 RX ORDER — ALBUTEROL SULFATE 90 UG/1
1-2 AEROSOL, METERED RESPIRATORY (INHALATION)
Status: CANCELLED
Start: 2024-06-17

## 2024-06-03 RX ORDER — DIPHENHYDRAMINE HYDROCHLORIDE 50 MG/ML
50 INJECTION INTRAMUSCULAR; INTRAVENOUS
Status: CANCELLED
Start: 2024-06-17

## 2024-06-03 RX ORDER — HEPARIN SODIUM (PORCINE) LOCK FLUSH IV SOLN 100 UNIT/ML 100 UNIT/ML
5 SOLUTION INTRAVENOUS
OUTPATIENT
Start: 2024-06-17

## 2024-06-03 RX ORDER — EPINEPHRINE 1 MG/ML
0.3 INJECTION, SOLUTION INTRAMUSCULAR; SUBCUTANEOUS EVERY 5 MIN PRN
Status: CANCELLED | OUTPATIENT
Start: 2024-06-17

## 2024-06-03 RX ORDER — HEPARIN SODIUM,PORCINE 10 UNIT/ML
5-20 VIAL (ML) INTRAVENOUS DAILY PRN
OUTPATIENT
Start: 2024-06-17

## 2024-06-03 RX ORDER — EPINEPHRINE 1 MG/ML
0.3 INJECTION, SOLUTION INTRAMUSCULAR; SUBCUTANEOUS EVERY 5 MIN PRN
Status: DISCONTINUED | OUTPATIENT
Start: 2024-06-03 | End: 2024-06-03

## 2024-06-03 RX ADMIN — SODIUM CHLORIDE 250 MG: 9 INJECTION, SOLUTION INTRAVENOUS at 12:36

## 2024-06-03 ASSESSMENT — PAIN SCALES - GENERAL: PAINLEVEL: MODERATE PAIN (4)

## 2024-06-03 NOTE — PROGRESS NOTES
Infusion Nursing Note:  Rose Ronquillo presents today for Methylprednisolone.    Patient seen by provider today: No   present during visit today: Not Applicable.    Note: Rose arrived ambulatory into the infusion center for Methylprednisolone infusion in a stable condition, she denies pain or discomfort at this time but states she have increased stiffness on her fingers , VSS. One time order was given by her provider because plan ended and she will be seeing her provider in two days.  She feels there may be some changes in the dose. Plan of care and medication reviewed, she verbalized understanding of her plan of care and return to clinic.   Intravenous Access:  Peripheral IV placed.  Treatment Conditions:  Not Applicable.  Post Infusion Assessment:  Patient tolerated infusion without incident.  Site patent and intact, free from redness, edema or discomfort.  No evidence of extravasations.  Access discontinued per protocol.   Discharge Plan:   Discharge instructions reviewed with: Patient.  Patient and/or family verbalized understanding of discharge instructions and all questions answered.  Patient discharged in stable condition accompanied by: self.  Departure Mode: Ambulatory.    Gabriella Dang RN

## 2024-06-05 ENCOUNTER — OFFICE VISIT (OUTPATIENT)
Dept: NEUROLOGY | Facility: CLINIC | Age: 34
End: 2024-06-05
Payer: COMMERCIAL

## 2024-06-05 VITALS
BODY MASS INDEX: 19.34 KG/M2 | DIASTOLIC BLOOD PRESSURE: 60 MMHG | WEIGHT: 127.2 LBS | HEART RATE: 79 BPM | SYSTOLIC BLOOD PRESSURE: 109 MMHG

## 2024-06-05 DIAGNOSIS — R27.9 INCOORDINATION: ICD-10-CM

## 2024-06-05 DIAGNOSIS — R27.0 ATAXIA: ICD-10-CM

## 2024-06-05 DIAGNOSIS — M35.9 AUTOIMMUNE DISEASE (H): Primary | ICD-10-CM

## 2024-06-05 DIAGNOSIS — Z79.899 ENCOUNTER FOR LONG-TERM (CURRENT) USE OF MEDICATIONS: ICD-10-CM

## 2024-06-05 PROCEDURE — 99214 OFFICE O/P EST MOD 30 MIN: CPT | Performed by: PSYCHIATRY & NEUROLOGY

## 2024-06-05 NOTE — NURSING NOTE
"Rose Ronquillo is a 34 year old female who presents for:  Chief Complaint   Patient presents with    Follow Up     Fine motor skill issues. Wondering if the low dose and her job is causing her body to be tired.   Form for Medical Clearance for IV etc therapy.  L palm constant itchy        Initial Vitals:  /60   Pulse 79   Wt 57.7 kg (127 lb 3.2 oz)   BMI 19.34 kg/m   Estimated body mass index is 19.34 kg/m  as calculated from the following:    Height as of 4/19/23: 1.727 m (5' 8\").    Weight as of this encounter: 57.7 kg (127 lb 3.2 oz).. Body surface area is 1.66 meters squared. BP completed using cuff size: wrist cuff    Jeronimo Field  "

## 2024-06-05 NOTE — LETTER
"6/5/2024      Rose Ronquillo  [address] [facility] [id number]      Dear Colleague,    Thank you for referring your patient, Rose Ronquillo, to the [facility] [place]. Please see a copy of my visit note below.    ESTABLISHED PATIENT NEUROLOGY NOTE    DATE OF VISIT: 6/5/2024  MRN: [id number]  PATIENT NAME: Rose Ronquillo  YOB: 1990    Chief Complaint   Patient presents with     Follow Up     Fine motor skill issues. Wondering if the low dose and her job is causing her body to be tired.   Form for Medical Clearance for IV etc therapy.  L palm constant itchy     SUBJECTIVE:                                                      HISTORY OF PRESENT ILLNESS:  Rose is here for follow up regarding weakness and paresthesias of unclear etiology.      History as previously documented by me (2.5.24):  Workup for autoimmune diagnosis has been largely unremarkable but from a symptomatic standpoint she has improved with steroid infusions.  We pulled back on the frequency to every other week dosing before her last visit few months ago and then at that time decreased the dose of the infusions.  Please see previous notes for additional historical information.     Rose says she has noticed a little more \"stress sweating.\" She had a migraine after her last infusion which was a new issue/response.     She has some occasional difficulties using the hands in terms of dexterity. She has some trouble with the Left leg at times as well. Nothing new or profoundly different. It still takes her several days to recover from the infusions.       Our plan was to decrease her methylprednisolone to 250mg, continuing every two weeks.     Rose has started work as an  and is noticing it is harder to use her hands, manipulate things for her job.  For example she has trouble opening jars and with coordinated movements.  Her left leg still occasionally feels strange but she has not had any " problems with it freezing up/not working as she did before.  The changes with her hand dexterity seem to correlate with her decrease in the methylprednisolone.  She wonders if it is a combination of the 2 things.  She does note that she is sleeping better on the lower dose of steroids.    CURRENT MEDICATIONS:   Current Outpatient Medications   Medication Sig Dispense Refill     Calcium Carbonate Antacid (TUMS PO)        Cholecalciferol (VITAMIN D PO)        gabapentin (NEURONTIN) 100 MG capsule Take 100 mg by mouth Takes 400mg in the morning       gabapentin (NEURONTIN) 300 MG capsule Take 300 mg by mouth       ibuprofen (ADVIL,MOTRIN) 200 MG tablet Take 200 mg by mouth every 4 hours as needed       Ipratropium-Albuterol (COMBIVENT RESPIMAT)  MCG/ACT inhaler Inhale 1 puff into the lungs 4 times daily Not to exceed 6 doses per day. 1 Inhaler 0     sertraline (ZOLOFT) 25 MG tablet Take 1 tablet by mouth daily at 2 pm       UNABLE TO FIND 500 mg every 14 days MEDICATION NAME: Prednisone 250 mg infusion every other week.       VYVANSE 50 MG OR CAPS 1 CAPSULE DAILY       ZOLOFT 50 MG OR TABS I tablet daily 30 0     amLODIPine (NORVASC) 2.5 MG tablet TAKE 1 TABLET(2.5 MG) BY MOUTH DAILY (Patient not taking: Reported on 9/11/2023) 90 tablet 3     fish oil-omega-3 fatty acids (OMEGA-3 FISH OIL) 1000 MG capsule  (Patient not taking: Reported on 2/5/2024)       modafinil (PROVIGIL) 100 MG tablet Take 1 tablet (100 mg) by mouth daily (Patient not taking: Reported on 10/9/2023) 30 tablet 5     Multiple Vitamin (MULTI-VITAMIN PO)  (Patient not taking: Reported on 8/28/2023)       nitroGLYcerin (NITRO-BID) 2 % OINT ointment Place 1 inch (15 mg) onto the skin every 24 hours Apply to affected area ( toes or fingers)  at night and avoid touching eyes or face . (Patient not taking: Reported on 8/28/2023) 30 g 5     No current facility-administered medications for this visit.       RECENT DIAGNOSTIC STUDIES:   Labs: No results  found for any visits on 06/05/24.    REVIEW OF SYSTEMS:                                                      10-point review of systems is negative except as mentioned above in HPI.    EXAM:                                                      Physical Exam:   Vitals: /60   Pulse 79   Wt 57.7 kg (127 lb 3.2 oz)   BMI 19.34 kg/m    BMI= Body mass index is 19.34 kg/m .  GENERAL: NAD.  HEENT: NC/AT.  CV: RRR. S1, S2.   NECK: No bruits.  PULM: Non-labored breathing.   Focused Neurologic:  MENTAL STATUS: Alert, attentive. Speech is fluent. Normal comprehension. Normal concentration. Adequate fund of knowledge.   CRANIAL NERVES: Facial movement normal. EOM full. Hearing intact to conversation. Trapezius strength intact.   MOTOR: Strength is full in the deltoids, biceps, triceps and hands.  Strength in the legs is again full on today's exam.  Tone and bulk normal.   DTRs: Intact and symmetric in biceps, BR, patellae.  2+/2 in the upper extremities, slightly brisk at the knees. No crossed adductor response.  This is an improvement.   SENSATION: Normal light touch throughout.  COORDINATION: Finger tapping is a little bit slow dysmetric bilaterally.  STATION AND GAIT: Casual gait is normal.   Right hand-dominant.    ASSESSMENT and PLAN:                                                      Assessment:    ICD-10-CM    1. Autoimmune disease (H24)  M35.9       2. Incoordination  R27.9 Occupational Therapy  Referral      3. Encounter for long-term (current) use of medications  Z79.899       4. Ataxia  R27.0 Occupational Therapy  Referral     CANCELED: Occupational Therapy  Referral          Ms. Ronquillo is a pleasant 34-year-old woman here for follow-up regarding acute onset weakness and paresthesias of unclear etiology.  It has been suspected that she has an autoimmune process going on because she has responded to steroids.  I think we need to do a little bit of adjusting of the dose given the new  problems with coordination in her hands.  Otherwise she is doing quite well.  We did also discuss adjunctive therapy through OT.  She will think about this given her busy schedule.  We will plan to follow-up again in 6 months.  I asked Rose to keep me posted on her progress in the meantime and she agrees.    Plan:  We will try increasing the steroid infusion dose to 400mg. Continuing with every other week infusions.  Referral for occupational therapy / Hand therapy.   Return to Neurology in 6 months.     Total Time: 32 minutes were spent with the patient and in chart review/documentation (as described above in Assessment and Plan)/coordinating the care on date of service.    Yolande Colvin MD  Neurology    Dragon software used in the dictation of this note.                    Again, thank you for allowing me to participate in the care of your patient.        Sincerely,        Yolande Colvin MD

## 2024-06-05 NOTE — PROGRESS NOTES
"ESTABLISHED PATIENT NEUROLOGY NOTE    DATE OF VISIT: 6/5/2024  MRN: [id number]  PATIENT NAME: Rose Ronquillo  YOB: 1990    Chief Complaint   Patient presents with    Follow Up     Fine motor skill issues. Wondering if the low dose and her job is causing her body to be tired.   Form for Medical Clearance for IV etc therapy.  L palm constant itchy     SUBJECTIVE:                                                      HISTORY OF PRESENT ILLNESS:  Rose is here for follow up regarding weakness and paresthesias of unclear etiology.      History as previously documented by me (2.5.24):  Workup for autoimmune diagnosis has been largely unremarkable but from a symptomatic standpoint she has improved with steroid infusions.  We pulled back on the frequency to every other week dosing before her last visit few months ago and then at that time decreased the dose of the infusions.  Please see previous notes for additional historical information.     Rose says she has noticed a little more \"stress sweating.\" She had a migraine after her last infusion which was a new issue/response.     She has some occasional difficulties using the hands in terms of dexterity. She has some trouble with the Left leg at times as well. Nothing new or profoundly different. It still takes her several days to recover from the infusions.       Our plan was to decrease her methylprednisolone to 250mg, continuing every two weeks.     Rose has started work as an  and is noticing it is harder to use her hands, manipulate things for her job.  For example she has trouble opening jars and with coordinated movements.  Her left leg still occasionally feels strange but she has not had any problems with it freezing up/not working as she did before.  The changes with her hand dexterity seem to correlate with her decrease in the methylprednisolone.  She wonders if it is a combination of the 2 things.  She does note that she is sleeping " better on the lower dose of steroids.    CURRENT MEDICATIONS:   Current Outpatient Medications   Medication Sig Dispense Refill    Calcium Carbonate Antacid (TUMS PO)       Cholecalciferol (VITAMIN D PO)       gabapentin (NEURONTIN) 100 MG capsule Take 100 mg by mouth Takes 400mg in the morning      gabapentin (NEURONTIN) 300 MG capsule Take 300 mg by mouth      ibuprofen (ADVIL,MOTRIN) 200 MG tablet Take 200 mg by mouth every 4 hours as needed      Ipratropium-Albuterol (COMBIVENT RESPIMAT)  MCG/ACT inhaler Inhale 1 puff into the lungs 4 times daily Not to exceed 6 doses per day. 1 Inhaler 0    sertraline (ZOLOFT) 25 MG tablet Take 1 tablet by mouth daily at 2 pm      UNABLE TO FIND 500 mg every 14 days MEDICATION NAME: Prednisone 250 mg infusion every other week.      VYVANSE 50 MG OR CAPS 1 CAPSULE DAILY      ZOLOFT 50 MG OR TABS I tablet daily 30 0    amLODIPine (NORVASC) 2.5 MG tablet TAKE 1 TABLET(2.5 MG) BY MOUTH DAILY (Patient not taking: Reported on 9/11/2023) 90 tablet 3    fish oil-omega-3 fatty acids (OMEGA-3 FISH OIL) 1000 MG capsule  (Patient not taking: Reported on 2/5/2024)      modafinil (PROVIGIL) 100 MG tablet Take 1 tablet (100 mg) by mouth daily (Patient not taking: Reported on 10/9/2023) 30 tablet 5    Multiple Vitamin (MULTI-VITAMIN PO)  (Patient not taking: Reported on 8/28/2023)      nitroGLYcerin (NITRO-BID) 2 % OINT ointment Place 1 inch (15 mg) onto the skin every 24 hours Apply to affected area ( toes or fingers)  at night and avoid touching eyes or face . (Patient not taking: Reported on 8/28/2023) 30 g 5     No current facility-administered medications for this visit.       RECENT DIAGNOSTIC STUDIES:   Labs: No results found for any visits on 06/05/24.    REVIEW OF SYSTEMS:                                                      10-point review of systems is negative except as mentioned above in HPI.    EXAM:                                                      Physical Exam:    Vitals: /60   Pulse 79   Wt 57.7 kg (127 lb 3.2 oz)   BMI 19.34 kg/m    BMI= Body mass index is 19.34 kg/m .  GENERAL: NAD.  HEENT: NC/AT.  CV: RRR. S1, S2.   NECK: No bruits.  PULM: Non-labored breathing.   Focused Neurologic:  MENTAL STATUS: Alert, attentive. Speech is fluent. Normal comprehension. Normal concentration. Adequate fund of knowledge.   CRANIAL NERVES: Facial movement normal. EOM full. Hearing intact to conversation. Trapezius strength intact.   MOTOR: Strength is full in the deltoids, biceps, triceps and hands.  Strength in the legs is again full on today's exam.  Tone and bulk normal.   DTRs: Intact and symmetric in biceps, BR, patellae.  2+/2 in the upper extremities, slightly brisk at the knees. No crossed adductor response.  This is an improvement.   SENSATION: Normal light touch throughout.  COORDINATION: Finger tapping is a little bit slow dysmetric bilaterally.  STATION AND GAIT: Casual gait is normal.   Right hand-dominant.    ASSESSMENT and PLAN:                                                      Assessment:    ICD-10-CM    1. Autoimmune disease (H24)  M35.9       2. Incoordination  R27.9 Occupational Therapy  Referral      3. Encounter for long-term (current) use of medications  Z79.899       4. Ataxia  R27.0 Occupational Therapy  Referral     CANCELED: Occupational Therapy  Referral          Ms. Ronquillo is a pleasant 34-year-old woman here for follow-up regarding acute onset weakness and paresthesias of unclear etiology.  It has been suspected that she has an autoimmune process going on because she has responded to steroids.  I think we need to do a little bit of adjusting of the dose given the new problems with coordination in her hands.  Otherwise she is doing quite well.  We did also discuss adjunctive therapy through OT.  She will think about this given her busy schedule.  We will plan to follow-up again in 6 months.  I asked Rose to keep me  posted on her progress in the meantime and she agrees.    Plan:  We will try increasing the steroid infusion dose to 400mg. Continuing with every other week infusions.  Referral for occupational therapy / Hand therapy.   Return to Neurology in 6 months.     Total Time: 32 minutes were spent with the patient and in chart review/documentation (as described above in Assessment and Plan)/coordinating the care on date of service.    Yolande Colvin MD  Neurology    Methodon software used in the dictation of this note.

## 2024-06-05 NOTE — PATIENT INSTRUCTIONS
Plan:  We will try increasing the steroid infusion dose to 400mg. Continuing with every other week infusions.  Referral for occupational therapy / Hand therapy.   Return to Neurology in 6 months.

## 2024-06-16 ENCOUNTER — HEALTH MAINTENANCE LETTER (OUTPATIENT)
Age: 34
End: 2024-06-16

## 2024-06-17 ENCOUNTER — INFUSION THERAPY VISIT (OUTPATIENT)
Dept: INFUSION THERAPY | Facility: HOSPITAL | Age: 34
End: 2024-06-17
Attending: PSYCHIATRY & NEUROLOGY
Payer: COMMERCIAL

## 2024-06-17 VITALS
DIASTOLIC BLOOD PRESSURE: 52 MMHG | SYSTOLIC BLOOD PRESSURE: 94 MMHG | OXYGEN SATURATION: 97 % | HEART RATE: 70 BPM | TEMPERATURE: 97.8 F | RESPIRATION RATE: 16 BRPM

## 2024-06-17 DIAGNOSIS — R20.3 HYPERESTHESIA: Primary | ICD-10-CM

## 2024-06-17 PROCEDURE — 96365 THER/PROPH/DIAG IV INF INIT: CPT

## 2024-06-17 PROCEDURE — 250N000011 HC RX IP 250 OP 636: Performed by: PSYCHIATRY & NEUROLOGY

## 2024-06-17 PROCEDURE — 258N000003 HC RX IP 258 OP 636: Mod: JZ | Performed by: PSYCHIATRY & NEUROLOGY

## 2024-06-17 RX ORDER — ALBUTEROL SULFATE 0.83 MG/ML
2.5 SOLUTION RESPIRATORY (INHALATION)
Status: DISCONTINUED | OUTPATIENT
Start: 2024-06-17 | End: 2024-06-17 | Stop reason: HOSPADM

## 2024-06-17 RX ORDER — EPINEPHRINE 1 MG/ML
0.3 INJECTION, SOLUTION INTRAMUSCULAR; SUBCUTANEOUS EVERY 5 MIN PRN
Status: CANCELLED | OUTPATIENT
Start: 2024-07-01

## 2024-06-17 RX ORDER — METHYLPREDNISOLONE SODIUM SUCCINATE 125 MG/2ML
125 INJECTION, POWDER, LYOPHILIZED, FOR SOLUTION INTRAMUSCULAR; INTRAVENOUS
Status: DISCONTINUED | OUTPATIENT
Start: 2024-06-17 | End: 2024-06-17 | Stop reason: HOSPADM

## 2024-06-17 RX ORDER — HEPARIN SODIUM (PORCINE) LOCK FLUSH IV SOLN 100 UNIT/ML 100 UNIT/ML
5 SOLUTION INTRAVENOUS
Status: CANCELLED | OUTPATIENT
Start: 2024-07-01

## 2024-06-17 RX ORDER — MEPERIDINE HYDROCHLORIDE 25 MG/ML
25 INJECTION INTRAMUSCULAR; INTRAVENOUS; SUBCUTANEOUS EVERY 30 MIN PRN
Status: CANCELLED | OUTPATIENT
Start: 2024-07-01

## 2024-06-17 RX ORDER — METHYLPREDNISOLONE SODIUM SUCCINATE 125 MG/2ML
125 INJECTION, POWDER, LYOPHILIZED, FOR SOLUTION INTRAMUSCULAR; INTRAVENOUS
Status: CANCELLED
Start: 2024-07-01

## 2024-06-17 RX ORDER — ALBUTEROL SULFATE 90 UG/1
1-2 AEROSOL, METERED RESPIRATORY (INHALATION)
Status: DISCONTINUED | OUTPATIENT
Start: 2024-06-17 | End: 2024-06-17 | Stop reason: HOSPADM

## 2024-06-17 RX ORDER — MEPERIDINE HYDROCHLORIDE 25 MG/ML
25 INJECTION INTRAMUSCULAR; INTRAVENOUS; SUBCUTANEOUS EVERY 30 MIN PRN
Status: DISCONTINUED | OUTPATIENT
Start: 2024-06-17 | End: 2024-06-17 | Stop reason: HOSPADM

## 2024-06-17 RX ORDER — DIPHENHYDRAMINE HYDROCHLORIDE 50 MG/ML
50 INJECTION INTRAMUSCULAR; INTRAVENOUS
Status: DISCONTINUED | OUTPATIENT
Start: 2024-06-17 | End: 2024-06-17 | Stop reason: HOSPADM

## 2024-06-17 RX ORDER — HEPARIN SODIUM,PORCINE 10 UNIT/ML
5-20 VIAL (ML) INTRAVENOUS DAILY PRN
Status: CANCELLED | OUTPATIENT
Start: 2024-07-01

## 2024-06-17 RX ORDER — ALBUTEROL SULFATE 0.83 MG/ML
2.5 SOLUTION RESPIRATORY (INHALATION)
Status: CANCELLED | OUTPATIENT
Start: 2024-07-01

## 2024-06-17 RX ORDER — DIPHENHYDRAMINE HYDROCHLORIDE 50 MG/ML
50 INJECTION INTRAMUSCULAR; INTRAVENOUS
Status: CANCELLED
Start: 2024-07-01

## 2024-06-17 RX ORDER — ALBUTEROL SULFATE 90 UG/1
1-2 AEROSOL, METERED RESPIRATORY (INHALATION)
Status: CANCELLED
Start: 2024-07-01

## 2024-06-17 RX ORDER — EPINEPHRINE 1 MG/ML
0.3 INJECTION, SOLUTION INTRAMUSCULAR; SUBCUTANEOUS EVERY 5 MIN PRN
Status: DISCONTINUED | OUTPATIENT
Start: 2024-06-17 | End: 2024-06-17 | Stop reason: HOSPADM

## 2024-06-17 RX ADMIN — SODIUM CHLORIDE 250 ML: 9 INJECTION, SOLUTION INTRAVENOUS at 12:15

## 2024-06-17 RX ADMIN — SODIUM CHLORIDE 400 MG: 9 INJECTION, SOLUTION INTRAVENOUS at 12:15

## 2024-06-17 ASSESSMENT — PAIN SCALES - GENERAL: PAINLEVEL: MODERATE PAIN (4)

## 2024-06-17 NOTE — PROGRESS NOTES
Infusion Nursing Note:  Rose Ronquillo presents today for Infusion Methylprednisolone 400 mg.    Patient seen by provider today: No   present during visit today: Not Applicable.    Note: Rose arrived into the infusion center ambulatory in a stable condition for high dose Methylprednisolone, VSS BP on the low side. Plan of care reviewed with her . She verbalized understanding of her plan of care and return to clinic. She stated her provider increased her dose back to 400 mg for now. She complained of sudden flare up and stiffness last night but she is feeling much better this morning.    Intravenous Access:  Peripheral IV placed.    Treatment Conditions:  Not Applicable.    Post Infusion Assessment:  Patient tolerated infusion without incident.  Site patent and intact, free from redness, edema or discomfort.  No evidence of extravasations.  Access discontinued per protocol.     Discharge Plan:   Discharge instructions reviewed with: Patient.  Patient and/or family verbalized understanding of discharge instructions and all questions answered.  Patient discharged in stable condition accompanied by: self.  Departure Mode: Ambulatory.    Gabriella Dang RN

## 2024-07-01 ENCOUNTER — INFUSION THERAPY VISIT (OUTPATIENT)
Dept: INFUSION THERAPY | Facility: HOSPITAL | Age: 34
End: 2024-07-01
Attending: PSYCHIATRY & NEUROLOGY
Payer: COMMERCIAL

## 2024-07-01 VITALS
SYSTOLIC BLOOD PRESSURE: 102 MMHG | DIASTOLIC BLOOD PRESSURE: 55 MMHG | TEMPERATURE: 97.5 F | HEART RATE: 73 BPM | RESPIRATION RATE: 16 BRPM | OXYGEN SATURATION: 99 %

## 2024-07-01 DIAGNOSIS — R20.3 HYPERESTHESIA: Primary | ICD-10-CM

## 2024-07-01 PROCEDURE — 258N000003 HC RX IP 258 OP 636: Performed by: PSYCHIATRY & NEUROLOGY

## 2024-07-01 PROCEDURE — 96365 THER/PROPH/DIAG IV INF INIT: CPT

## 2024-07-01 PROCEDURE — 250N000011 HC RX IP 250 OP 636: Performed by: PSYCHIATRY & NEUROLOGY

## 2024-07-01 RX ORDER — ALBUTEROL SULFATE 0.83 MG/ML
2.5 SOLUTION RESPIRATORY (INHALATION)
Status: DISCONTINUED | OUTPATIENT
Start: 2024-07-01 | End: 2024-07-01 | Stop reason: HOSPADM

## 2024-07-01 RX ORDER — MEPERIDINE HYDROCHLORIDE 25 MG/ML
25 INJECTION INTRAMUSCULAR; INTRAVENOUS; SUBCUTANEOUS EVERY 30 MIN PRN
Status: CANCELLED | OUTPATIENT
Start: 2024-07-15

## 2024-07-01 RX ORDER — DIPHENHYDRAMINE HYDROCHLORIDE 50 MG/ML
50 INJECTION INTRAMUSCULAR; INTRAVENOUS
Status: DISCONTINUED | OUTPATIENT
Start: 2024-07-01 | End: 2024-07-01 | Stop reason: HOSPADM

## 2024-07-01 RX ORDER — METHYLPREDNISOLONE SODIUM SUCCINATE 125 MG/2ML
125 INJECTION, POWDER, LYOPHILIZED, FOR SOLUTION INTRAMUSCULAR; INTRAVENOUS
Status: DISCONTINUED | OUTPATIENT
Start: 2024-07-01 | End: 2024-07-01 | Stop reason: HOSPADM

## 2024-07-01 RX ORDER — MEPERIDINE HYDROCHLORIDE 25 MG/ML
25 INJECTION INTRAMUSCULAR; INTRAVENOUS; SUBCUTANEOUS EVERY 30 MIN PRN
Status: DISCONTINUED | OUTPATIENT
Start: 2024-07-01 | End: 2024-07-01 | Stop reason: HOSPADM

## 2024-07-01 RX ORDER — EPINEPHRINE 1 MG/ML
0.3 INJECTION, SOLUTION INTRAMUSCULAR; SUBCUTANEOUS EVERY 5 MIN PRN
Status: CANCELLED | OUTPATIENT
Start: 2024-07-15

## 2024-07-01 RX ORDER — DIPHENHYDRAMINE HYDROCHLORIDE 50 MG/ML
50 INJECTION INTRAMUSCULAR; INTRAVENOUS
Status: CANCELLED
Start: 2024-07-15

## 2024-07-01 RX ORDER — EPINEPHRINE 1 MG/ML
0.3 INJECTION, SOLUTION INTRAMUSCULAR; SUBCUTANEOUS EVERY 5 MIN PRN
Status: DISCONTINUED | OUTPATIENT
Start: 2024-07-01 | End: 2024-07-01 | Stop reason: HOSPADM

## 2024-07-01 RX ORDER — HEPARIN SODIUM (PORCINE) LOCK FLUSH IV SOLN 100 UNIT/ML 100 UNIT/ML
5 SOLUTION INTRAVENOUS
Status: CANCELLED | OUTPATIENT
Start: 2024-07-15

## 2024-07-01 RX ORDER — METHYLPREDNISOLONE SODIUM SUCCINATE 125 MG/2ML
125 INJECTION, POWDER, LYOPHILIZED, FOR SOLUTION INTRAMUSCULAR; INTRAVENOUS
Status: CANCELLED
Start: 2024-07-15

## 2024-07-01 RX ORDER — HEPARIN SODIUM,PORCINE 10 UNIT/ML
5-20 VIAL (ML) INTRAVENOUS DAILY PRN
Status: CANCELLED | OUTPATIENT
Start: 2024-07-15

## 2024-07-01 RX ORDER — ALBUTEROL SULFATE 90 UG/1
1-2 AEROSOL, METERED RESPIRATORY (INHALATION)
Status: DISCONTINUED | OUTPATIENT
Start: 2024-07-01 | End: 2024-07-01 | Stop reason: HOSPADM

## 2024-07-01 RX ORDER — ALBUTEROL SULFATE 0.83 MG/ML
2.5 SOLUTION RESPIRATORY (INHALATION)
Status: CANCELLED | OUTPATIENT
Start: 2024-07-15

## 2024-07-01 RX ORDER — ALBUTEROL SULFATE 90 UG/1
1-2 AEROSOL, METERED RESPIRATORY (INHALATION)
Status: CANCELLED
Start: 2024-07-15

## 2024-07-01 RX ADMIN — SODIUM CHLORIDE 400 MG: 9 INJECTION, SOLUTION INTRAVENOUS at 12:18

## 2024-07-01 RX ADMIN — SODIUM CHLORIDE 250 ML: 9 INJECTION, SOLUTION INTRAVENOUS at 12:17

## 2024-07-01 ASSESSMENT — PAIN SCALES - GENERAL: PAINLEVEL: MILD PAIN (2)

## 2024-07-01 NOTE — PROGRESS NOTES
Infusion Nursing Note:  Rose Ronquillo presents today for Infusion Methylprednisolone 400 mg.    Patient seen by provider today: No   present during visit today: Not Applicable.    Note: Rose arrived into the infusion center ambulatory in a stable condition for high dose Methylprednisolone, VSS. Plan of care reviewed with her. She verbalized understanding of her plan of care and return to clinic.   Intravenous Access:  Peripheral IV placed.    Treatment Conditions:  Not Applicable.    Post Infusion Assessment:  Patient tolerated infusion without incident.  Site patent and intact, free from redness, edema or discomfort.  No evidence of extravasations.  Access discontinued per protocol.     Discharge Plan:   Discharge instructions reviewed with: Patient.  Patient and/or family verbalized understanding of discharge instructions and all questions answered.  Patient discharged in stable condition accompanied by: self.  Departure Mode: Ambulatory.    Gabriella Dang RN

## 2024-07-15 ENCOUNTER — INFUSION THERAPY VISIT (OUTPATIENT)
Dept: INFUSION THERAPY | Facility: HOSPITAL | Age: 34
End: 2024-07-15
Payer: COMMERCIAL

## 2024-07-15 VITALS
HEART RATE: 92 BPM | OXYGEN SATURATION: 96 % | RESPIRATION RATE: 16 BRPM | SYSTOLIC BLOOD PRESSURE: 96 MMHG | TEMPERATURE: 97.6 F | DIASTOLIC BLOOD PRESSURE: 53 MMHG

## 2024-07-15 DIAGNOSIS — R20.3 HYPERESTHESIA: Primary | ICD-10-CM

## 2024-07-15 PROCEDURE — 96365 THER/PROPH/DIAG IV INF INIT: CPT

## 2024-07-15 PROCEDURE — 258N000003 HC RX IP 258 OP 636: Performed by: PSYCHIATRY & NEUROLOGY

## 2024-07-15 PROCEDURE — 250N000011 HC RX IP 250 OP 636: Performed by: PSYCHIATRY & NEUROLOGY

## 2024-07-15 RX ORDER — MEPERIDINE HYDROCHLORIDE 25 MG/ML
25 INJECTION INTRAMUSCULAR; INTRAVENOUS; SUBCUTANEOUS EVERY 30 MIN PRN
Status: CANCELLED | OUTPATIENT
Start: 2024-07-29

## 2024-07-15 RX ORDER — ALBUTEROL SULFATE 0.83 MG/ML
2.5 SOLUTION RESPIRATORY (INHALATION)
Status: CANCELLED | OUTPATIENT
Start: 2024-07-29

## 2024-07-15 RX ORDER — ALBUTEROL SULFATE 0.83 MG/ML
2.5 SOLUTION RESPIRATORY (INHALATION)
Status: DISCONTINUED | OUTPATIENT
Start: 2024-07-15 | End: 2024-07-15 | Stop reason: HOSPADM

## 2024-07-15 RX ORDER — HEPARIN SODIUM,PORCINE 10 UNIT/ML
5-20 VIAL (ML) INTRAVENOUS DAILY PRN
Status: CANCELLED | OUTPATIENT
Start: 2024-07-29

## 2024-07-15 RX ORDER — HEPARIN SODIUM (PORCINE) LOCK FLUSH IV SOLN 100 UNIT/ML 100 UNIT/ML
5 SOLUTION INTRAVENOUS
Status: CANCELLED | OUTPATIENT
Start: 2024-07-29

## 2024-07-15 RX ORDER — ALBUTEROL SULFATE 90 UG/1
1-2 AEROSOL, METERED RESPIRATORY (INHALATION)
Status: DISCONTINUED | OUTPATIENT
Start: 2024-07-15 | End: 2024-07-15 | Stop reason: HOSPADM

## 2024-07-15 RX ORDER — METHYLPREDNISOLONE SODIUM SUCCINATE 125 MG/2ML
125 INJECTION, POWDER, LYOPHILIZED, FOR SOLUTION INTRAMUSCULAR; INTRAVENOUS
Status: CANCELLED
Start: 2024-07-29

## 2024-07-15 RX ORDER — DIPHENHYDRAMINE HYDROCHLORIDE 50 MG/ML
50 INJECTION INTRAMUSCULAR; INTRAVENOUS
Status: CANCELLED
Start: 2024-07-29

## 2024-07-15 RX ORDER — EPINEPHRINE 1 MG/ML
0.3 INJECTION, SOLUTION INTRAMUSCULAR; SUBCUTANEOUS EVERY 5 MIN PRN
Status: CANCELLED | OUTPATIENT
Start: 2024-07-29

## 2024-07-15 RX ORDER — DIPHENHYDRAMINE HYDROCHLORIDE 50 MG/ML
50 INJECTION INTRAMUSCULAR; INTRAVENOUS
Status: DISCONTINUED | OUTPATIENT
Start: 2024-07-15 | End: 2024-07-15 | Stop reason: HOSPADM

## 2024-07-15 RX ORDER — METHYLPREDNISOLONE SODIUM SUCCINATE 125 MG/2ML
125 INJECTION, POWDER, LYOPHILIZED, FOR SOLUTION INTRAMUSCULAR; INTRAVENOUS
Status: DISCONTINUED | OUTPATIENT
Start: 2024-07-15 | End: 2024-07-15 | Stop reason: HOSPADM

## 2024-07-15 RX ORDER — ALBUTEROL SULFATE 90 UG/1
1-2 AEROSOL, METERED RESPIRATORY (INHALATION)
Status: CANCELLED
Start: 2024-07-29

## 2024-07-15 RX ORDER — EPINEPHRINE 1 MG/ML
0.3 INJECTION, SOLUTION INTRAMUSCULAR; SUBCUTANEOUS EVERY 5 MIN PRN
Status: DISCONTINUED | OUTPATIENT
Start: 2024-07-15 | End: 2024-07-15 | Stop reason: HOSPADM

## 2024-07-15 RX ORDER — MEPERIDINE HYDROCHLORIDE 25 MG/ML
25 INJECTION INTRAMUSCULAR; INTRAVENOUS; SUBCUTANEOUS EVERY 30 MIN PRN
Status: DISCONTINUED | OUTPATIENT
Start: 2024-07-15 | End: 2024-07-15 | Stop reason: HOSPADM

## 2024-07-15 RX ADMIN — SODIUM CHLORIDE 250 ML: 9 INJECTION, SOLUTION INTRAVENOUS at 11:41

## 2024-07-15 RX ADMIN — SODIUM CHLORIDE 400 MG: 9 INJECTION, SOLUTION INTRAVENOUS at 12:02

## 2024-07-15 NOTE — PROGRESS NOTES
Infusion Nursing Note:  Rose Ronquillo presents today for Methylprednisolone.    Patient seen by provider today: No   present during visit today: Not Applicable.    Note: Rose comes to infusion ambulatory and in stable condition. Confirms that she is here for Methylprednisolone. States yesterday she had weakness and she believes it is due to the humidity. Today feeling better. A peripheral IV was placed in the left forearm x 1 attempt. Solumedrol administered over 15 minutes and then the line was flushed with NS. Once completed, the IV was removed and the site was covered with 2x2 gauze and secured in place with coban. Left infusion ambulatory and in stable condition. Will return in two weeks.     BP 96/53 (BP Location: Right arm, Patient Position: Sitting, Cuff Size: Adult Regular)   Pulse 92   Temp 97.6  F (36.4  C) (Oral)   Resp 16   SpO2 96%     Intravenous Access:  Peripheral IV placed.    Treatment Conditions:  Not Applicable.    Post Infusion Assessment:  Patient tolerated infusion without incident.  Site patent and intact, free from redness, edema or discomfort.  Access discontinued per protocol.     Discharge Plan:   Discharge instructions reviewed with: Patient.  Patient and/or family verbalized understanding of discharge instructions and all questions answered.  AVS to patient via OMGT.  Patient will return on 7/29 for next appointment.   Patient discharged in stable condition accompanied by: self.  Departure Mode: Ambulatory.    Yasmine Francisco RN

## 2024-07-29 ENCOUNTER — INFUSION THERAPY VISIT (OUTPATIENT)
Dept: INFUSION THERAPY | Facility: HOSPITAL | Age: 34
End: 2024-07-29
Attending: PSYCHIATRY & NEUROLOGY
Payer: COMMERCIAL

## 2024-07-29 VITALS
TEMPERATURE: 97.5 F | DIASTOLIC BLOOD PRESSURE: 58 MMHG | OXYGEN SATURATION: 98 % | SYSTOLIC BLOOD PRESSURE: 95 MMHG | HEART RATE: 65 BPM | RESPIRATION RATE: 16 BRPM

## 2024-07-29 DIAGNOSIS — R20.3 HYPERESTHESIA: Primary | ICD-10-CM

## 2024-07-29 PROCEDURE — 96365 THER/PROPH/DIAG IV INF INIT: CPT

## 2024-07-29 PROCEDURE — 258N000003 HC RX IP 258 OP 636: Performed by: PSYCHIATRY & NEUROLOGY

## 2024-07-29 PROCEDURE — 250N000011 HC RX IP 250 OP 636: Performed by: PSYCHIATRY & NEUROLOGY

## 2024-07-29 PROCEDURE — 96366 THER/PROPH/DIAG IV INF ADDON: CPT

## 2024-07-29 RX ORDER — MEPERIDINE HYDROCHLORIDE 25 MG/ML
25 INJECTION INTRAMUSCULAR; INTRAVENOUS; SUBCUTANEOUS EVERY 30 MIN PRN
Status: CANCELLED | OUTPATIENT
Start: 2024-08-12

## 2024-07-29 RX ORDER — METHYLPREDNISOLONE SODIUM SUCCINATE 125 MG/2ML
125 INJECTION, POWDER, LYOPHILIZED, FOR SOLUTION INTRAMUSCULAR; INTRAVENOUS
Status: DISCONTINUED | OUTPATIENT
Start: 2024-07-29 | End: 2024-07-29 | Stop reason: HOSPADM

## 2024-07-29 RX ORDER — EPINEPHRINE 1 MG/ML
0.3 INJECTION, SOLUTION INTRAMUSCULAR; SUBCUTANEOUS EVERY 5 MIN PRN
Status: DISCONTINUED | OUTPATIENT
Start: 2024-07-29 | End: 2024-07-29 | Stop reason: HOSPADM

## 2024-07-29 RX ORDER — ALBUTEROL SULFATE 90 UG/1
1-2 AEROSOL, METERED RESPIRATORY (INHALATION)
Status: CANCELLED
Start: 2024-08-12

## 2024-07-29 RX ORDER — HEPARIN SODIUM,PORCINE 10 UNIT/ML
5-20 VIAL (ML) INTRAVENOUS DAILY PRN
Status: CANCELLED | OUTPATIENT
Start: 2024-08-12

## 2024-07-29 RX ORDER — MEPERIDINE HYDROCHLORIDE 25 MG/ML
25 INJECTION INTRAMUSCULAR; INTRAVENOUS; SUBCUTANEOUS EVERY 30 MIN PRN
Status: DISCONTINUED | OUTPATIENT
Start: 2024-07-29 | End: 2024-07-29 | Stop reason: HOSPADM

## 2024-07-29 RX ORDER — ALBUTEROL SULFATE 0.83 MG/ML
2.5 SOLUTION RESPIRATORY (INHALATION)
Status: DISCONTINUED | OUTPATIENT
Start: 2024-07-29 | End: 2024-07-29 | Stop reason: HOSPADM

## 2024-07-29 RX ORDER — DIPHENHYDRAMINE HYDROCHLORIDE 50 MG/ML
50 INJECTION INTRAMUSCULAR; INTRAVENOUS
Status: CANCELLED
Start: 2024-08-12

## 2024-07-29 RX ORDER — HEPARIN SODIUM (PORCINE) LOCK FLUSH IV SOLN 100 UNIT/ML 100 UNIT/ML
5 SOLUTION INTRAVENOUS
Status: CANCELLED | OUTPATIENT
Start: 2024-08-12

## 2024-07-29 RX ORDER — DIPHENHYDRAMINE HYDROCHLORIDE 50 MG/ML
50 INJECTION INTRAMUSCULAR; INTRAVENOUS
Status: DISCONTINUED | OUTPATIENT
Start: 2024-07-29 | End: 2024-07-29 | Stop reason: HOSPADM

## 2024-07-29 RX ORDER — METHYLPREDNISOLONE SODIUM SUCCINATE 125 MG/2ML
125 INJECTION, POWDER, LYOPHILIZED, FOR SOLUTION INTRAMUSCULAR; INTRAVENOUS
Status: CANCELLED
Start: 2024-08-12

## 2024-07-29 RX ORDER — ALBUTEROL SULFATE 90 UG/1
1-2 AEROSOL, METERED RESPIRATORY (INHALATION)
Status: DISCONTINUED | OUTPATIENT
Start: 2024-07-29 | End: 2024-07-29 | Stop reason: HOSPADM

## 2024-07-29 RX ORDER — EPINEPHRINE 1 MG/ML
0.3 INJECTION, SOLUTION INTRAMUSCULAR; SUBCUTANEOUS EVERY 5 MIN PRN
Status: CANCELLED | OUTPATIENT
Start: 2024-08-12

## 2024-07-29 RX ORDER — ALBUTEROL SULFATE 0.83 MG/ML
2.5 SOLUTION RESPIRATORY (INHALATION)
Status: CANCELLED | OUTPATIENT
Start: 2024-08-12

## 2024-07-29 RX ADMIN — SODIUM CHLORIDE 400 MG: 9 INJECTION, SOLUTION INTRAVENOUS at 12:48

## 2024-07-29 RX ADMIN — SODIUM CHLORIDE 250 ML: 9 INJECTION, SOLUTION INTRAVENOUS at 12:47

## 2024-07-29 NOTE — PROGRESS NOTES
Infusion Nursing Note:  Rose Ronquillo presents today for Infusion Methylprednisolone 400 mg.    Patient seen by provider today: No   present during visit today: Not Applicable.    Note: Rose arrived into the infusion center ambulatory in a stable condition for high dose Methylprednisolone, VSS. Plan of care reviewed with her. She verbalized understanding of her plan of care and return to clinic.   Intravenous Access:  Peripheral IV placed.    Treatment Conditions:  Not Applicable.    Post Infusion Assessment:  Patient tolerated infusion without incident.  Site patent and intact, free from redness, edema or discomfort.  No evidence of extravasations.  Access discontinued per protocol.     Discharge Plan:   Discharge instructions reviewed with: Patient.  Patient and/or family verbalized understanding of discharge instructions and all questions answered.  Patient discharged in stable condition accompanied by: self.  Departure Mode: Ambulatory.    Gabriella Dang RN    Please see your GI doctor

## 2024-07-30 DIAGNOSIS — M42.00 SCHEUERMANN'S KYPHOSIS: Primary | ICD-10-CM

## 2024-08-01 ENCOUNTER — OFFICE VISIT (OUTPATIENT)
Dept: ORTHOPEDICS | Facility: CLINIC | Age: 34
End: 2024-08-01
Payer: COMMERCIAL

## 2024-08-01 ENCOUNTER — ANCILLARY PROCEDURE (OUTPATIENT)
Dept: GENERAL RADIOLOGY | Facility: CLINIC | Age: 34
End: 2024-08-01
Attending: ORTHOPAEDIC SURGERY
Payer: COMMERCIAL

## 2024-08-01 VITALS — WEIGHT: 127 LBS | HEIGHT: 69 IN | BODY MASS INDEX: 18.81 KG/M2

## 2024-08-01 DIAGNOSIS — M42.00 SCHEUERMANN'S KYPHOSIS: ICD-10-CM

## 2024-08-01 DIAGNOSIS — M42.00 SCHEUERMANN'S KYPHOSIS: Primary | ICD-10-CM

## 2024-08-01 PROCEDURE — 77073 BONE LENGTH STUDIES: CPT | Performed by: STUDENT IN AN ORGANIZED HEALTH CARE EDUCATION/TRAINING PROGRAM

## 2024-08-01 PROCEDURE — 99214 OFFICE O/P EST MOD 30 MIN: CPT | Performed by: ORTHOPAEDIC SURGERY

## 2024-08-01 PROCEDURE — 72082 X-RAY EXAM ENTIRE SPI 2/3 VW: CPT | Performed by: STUDENT IN AN ORGANIZED HEALTH CARE EDUCATION/TRAINING PROGRAM

## 2024-08-01 NOTE — PROGRESS NOTES
The patient returns today doing okay.  This is a reasonable improvement for her given where she has been.  Her autoimmune disease has recently flared up and she has had to go back up to 400 mg of methylprednisolone IV every other week.  She is also doing physical therapy Pilates and chiropractic treatment and this has made things tolerable for her.    Visual analog pain scale today is a 3 Oswestry disability index score is a 22%.    Objective: Physical exam shows an appropriately developed adult female in no acute distress.  Evaluation of her stance shows that she has a thoracolumbar mild gibbus deformity.  Her gait and station appear grossly normal today.    Imaging: I have independently ordered and interpreted AP and lateral EOS imaging.  There is been no progression of her thoracolumbar Scheuermann's kyphosis.          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    Plan: We talked about using a Styrofoam roller over her back.  I showed her an image that Dr. Martínez has used in the treatment of a patient with Scheuermann's kyphosis with good effect.  She would like to try this.  She is doing the right things.  I would be happy to see her back in the January to March timeframe prior to my full [facility].    My total contact time on day of visit including image ordering, independent image interpretation, face-to-face evaluation, documentation was 30 minutes.    Wong Culp MD

## 2024-08-01 NOTE — LETTER
8/1/2024      Rose Ronquillo  [address] [facility] [address]      Dear Colleague,    Thank you for referring your patient, Rose Ronquillo, to the [facility] [place]. Please see a copy of my visit note below.    The patient returns today doing okay.  This is a reasonable improvement for her given where she has been.  Her autoimmune disease has recently flared up and she has had to go back up to 400 mg of methylprednisolone IV every other week.  She is also doing physical therapy Pilates and chiropractic treatment and this has made things tolerable for her.    Visual analog pain scale today is a 3 Oswestry disability index score is a 22%.    Objective: Physical exam shows an appropriately developed adult female in no acute distress.  Evaluation of her stance shows that she has a thoracolumbar mild gibbus deformity.  Her gait and station appear grossly normal today.    Imaging: I have independently ordered and interpreted AP and lateral EOS imaging.  There is been no progression of her thoracolumbar Scheuermann's kyphosis.          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    Plan: We talked about using a Styrofoam roller over her back.  I showed her an image that Dr. Martínez has used in the treatment of a patient with Scheuermann's kyphosis with good effect.  She would like to try this.  She is doing the right things.  I would be happy to see her back in the January to March timeframe prior to my full [facility].    My total contact time on day of visit including image ordering, independent image interpretation, face-to-face evaluation, documentation was 30 minutes.    Wong Culp MD      Again, thank you for allowing me to participate in the care of your patient.        Sincerely,        Wong Culp MD

## 2024-08-01 NOTE — NURSING NOTE
"Reason For Visit:   Chief Complaint   Patient presents with    RECHECK     Scheuermann's kyphosis       Primary MD: Melani Norman  Ref. MD: EST    ?  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 Yes.  Work status?  Part-time.     Date of injury: No  Type of injury: childhood scoli.     Date of surgery: No  Type of surgery: No.     Smoker: No  Request smoking cessation information: No    Ht 1.743 m (5' 8.62\")   Wt 57.6 kg (127 lb)   BMI 18.96 kg/m      Pain Assessment  Patient Currently in Pain: Yes  0-10 Pain Scale: 3  Primary Pain Location: Back    Oswestry (HILL) Questionnaire        8/1/2024     7:13 AM   OSWESTRY DISABILITY INDEX   Count 10   Sum 11   Oswestry Score (%) 22 %        Visual Analog Pain Scale  Back Pain Scale 0-10: 3  Right leg pain: 0  Left leg pain: 0  Neck Pain Scale 0-10: 0  Right arm pain: 0  Left arm pain: 0    Promis 10 Assessment        8/1/2024     7:15 AM   PROMIS 10   In general, would you say your health is: Very good   In general, would you say your quality of life is: Very good   In general, how would you rate your physical health? Very good   In general, how would you rate your mental health, including your mood and your ability to think? Excellent   In general, how would you rate your satisfaction with your social activities and relationships? Very good   In general, please rate how well you carry out your usual social activities and roles Very good   To what extent are you able to carry out your everyday physical activities such as walking, climbing stairs, carrying groceries, or moving a chair? Mostly   In the past 7 days, how often have you been bothered by emotional problems such as feeling anxious, depressed, or irritable? Sometimes   In the past 7 days, how would you rate your fatigue on average? Moderate   In the past 7 days, how would you rate your pain on average, where 0 means no pain, and 10 means worst imaginable pain? 3   In general, would you say " your health is: 4   In general, would you say your quality of life is: 4   In general, how would you rate your physical health? 4   In general, how would you rate your mental health, including your mood and your ability to think? 5   In general, how would you rate your satisfaction with your social activities and relationships? 4   In general, please rate how well you carry out your usual social activities and roles. (This includes activities at home, at work and in your community, and responsibilities as a parent, child, spouse, employee, friend, etc.) 4   To what extent are you able to carry out your everyday physical activities such as walking, climbing stairs, carrying groceries, or moving a chair? 4   In the past 7 days, how often have you been bothered by emotional problems such as feeling anxious, depressed, or irritable? 3   In the past 7 days, how would you rate your fatigue on average? 3   In the past 7 days, how would you rate your pain on average, where 0 means no pain, and 10 means worst imaginable pain? 3   Global Mental Health Score 16   Global Physical Health Score 15   PROMIS TOTAL - SUBSCORES 31                Jackelin Diallo LPN

## 2024-08-12 ENCOUNTER — INFUSION THERAPY VISIT (OUTPATIENT)
Dept: INFUSION THERAPY | Facility: HOSPITAL | Age: 34
End: 2024-08-12
Payer: COMMERCIAL

## 2024-08-12 VITALS
TEMPERATURE: 97.5 F | OXYGEN SATURATION: 96 % | SYSTOLIC BLOOD PRESSURE: 106 MMHG | DIASTOLIC BLOOD PRESSURE: 60 MMHG | HEART RATE: 88 BPM | RESPIRATION RATE: 18 BRPM

## 2024-08-12 DIAGNOSIS — R20.3 HYPERESTHESIA: Primary | ICD-10-CM

## 2024-08-12 PROCEDURE — 250N000011 HC RX IP 250 OP 636: Mod: JW | Performed by: PSYCHIATRY & NEUROLOGY

## 2024-08-12 PROCEDURE — 96365 THER/PROPH/DIAG IV INF INIT: CPT

## 2024-08-12 PROCEDURE — 258N000003 HC RX IP 258 OP 636: Performed by: PSYCHIATRY & NEUROLOGY

## 2024-08-12 RX ORDER — HEPARIN SODIUM,PORCINE 10 UNIT/ML
5-20 VIAL (ML) INTRAVENOUS DAILY PRN
Status: CANCELLED | OUTPATIENT
Start: 2024-08-26

## 2024-08-12 RX ORDER — METHYLPREDNISOLONE SODIUM SUCCINATE 125 MG/2ML
125 INJECTION, POWDER, LYOPHILIZED, FOR SOLUTION INTRAMUSCULAR; INTRAVENOUS
Status: CANCELLED
Start: 2024-08-26

## 2024-08-12 RX ORDER — DIPHENHYDRAMINE HYDROCHLORIDE 50 MG/ML
50 INJECTION INTRAMUSCULAR; INTRAVENOUS
Status: CANCELLED
Start: 2024-08-26

## 2024-08-12 RX ORDER — EPINEPHRINE 1 MG/ML
0.3 INJECTION, SOLUTION INTRAMUSCULAR; SUBCUTANEOUS EVERY 5 MIN PRN
Status: DISCONTINUED | OUTPATIENT
Start: 2024-08-12 | End: 2024-08-12 | Stop reason: HOSPADM

## 2024-08-12 RX ORDER — ALBUTEROL SULFATE 90 UG/1
1-2 AEROSOL, METERED RESPIRATORY (INHALATION)
Status: DISCONTINUED | OUTPATIENT
Start: 2024-08-12 | End: 2024-08-12 | Stop reason: HOSPADM

## 2024-08-12 RX ORDER — MEPERIDINE HYDROCHLORIDE 25 MG/ML
25 INJECTION INTRAMUSCULAR; INTRAVENOUS; SUBCUTANEOUS EVERY 30 MIN PRN
Status: DISCONTINUED | OUTPATIENT
Start: 2024-08-12 | End: 2024-08-12 | Stop reason: HOSPADM

## 2024-08-12 RX ORDER — EPINEPHRINE 1 MG/ML
0.3 INJECTION, SOLUTION INTRAMUSCULAR; SUBCUTANEOUS EVERY 5 MIN PRN
Status: CANCELLED | OUTPATIENT
Start: 2024-08-26

## 2024-08-12 RX ORDER — ALBUTEROL SULFATE 0.83 MG/ML
2.5 SOLUTION RESPIRATORY (INHALATION)
Status: DISCONTINUED | OUTPATIENT
Start: 2024-08-12 | End: 2024-08-12 | Stop reason: HOSPADM

## 2024-08-12 RX ORDER — ALBUTEROL SULFATE 90 UG/1
1-2 AEROSOL, METERED RESPIRATORY (INHALATION)
Status: CANCELLED
Start: 2024-08-26

## 2024-08-12 RX ORDER — METHYLPREDNISOLONE SODIUM SUCCINATE 125 MG/2ML
125 INJECTION, POWDER, LYOPHILIZED, FOR SOLUTION INTRAMUSCULAR; INTRAVENOUS
Status: DISCONTINUED | OUTPATIENT
Start: 2024-08-12 | End: 2024-08-12 | Stop reason: HOSPADM

## 2024-08-12 RX ORDER — DIPHENHYDRAMINE HYDROCHLORIDE 50 MG/ML
50 INJECTION INTRAMUSCULAR; INTRAVENOUS
Status: DISCONTINUED | OUTPATIENT
Start: 2024-08-12 | End: 2024-08-12 | Stop reason: HOSPADM

## 2024-08-12 RX ORDER — ALBUTEROL SULFATE 0.83 MG/ML
2.5 SOLUTION RESPIRATORY (INHALATION)
Status: CANCELLED | OUTPATIENT
Start: 2024-08-26

## 2024-08-12 RX ORDER — MEPERIDINE HYDROCHLORIDE 25 MG/ML
25 INJECTION INTRAMUSCULAR; INTRAVENOUS; SUBCUTANEOUS EVERY 30 MIN PRN
Status: CANCELLED | OUTPATIENT
Start: 2024-08-26

## 2024-08-12 RX ORDER — HEPARIN SODIUM (PORCINE) LOCK FLUSH IV SOLN 100 UNIT/ML 100 UNIT/ML
5 SOLUTION INTRAVENOUS
Status: CANCELLED | OUTPATIENT
Start: 2024-08-26

## 2024-08-12 RX ADMIN — SODIUM CHLORIDE 400 MG: 9 INJECTION, SOLUTION INTRAVENOUS at 12:03

## 2024-08-12 RX ADMIN — SODIUM CHLORIDE 100 ML: 900 INJECTION INTRAVENOUS at 12:02

## 2024-08-12 NOTE — PROGRESS NOTES
Infusion Nursing Note:  Rose Ronquillo presents today for Methylprednisolone.    Patient seen by provider today: No   present during visit today: Not Applicable.    Note: Rose arrived ambulatory into the infusion center for Methylprednisolone infusion in a stable condition, she denies any pain or discomfort at this time, VSS.  Plan of care reviewed, she verbalized understanding of her plan of care and return to clinic. Plan of care reviewed, she verbalized understanding of her plan of care and return to clinic.    Intravenous Access:  Peripheral IV placed.    Treatment Conditions:  Not Applicable.    Post Infusion Assessment:  Patient tolerated infusion without incident.  Site patent and intact, free from redness, edema or discomfort.  No evidence of extravasations.  Access discontinued per protocol.     Discharge Plan:   Discharge instructions reviewed with: Patient.  Patient and/or family verbalized understanding of discharge instructions and all questions answered.  Patient discharged in stable condition accompanied by: self.  Departure Mode: Ambulatory.    Nannette Dumas RN

## 2024-08-26 ENCOUNTER — INFUSION THERAPY VISIT (OUTPATIENT)
Dept: INFUSION THERAPY | Facility: HOSPITAL | Age: 34
End: 2024-08-26
Payer: COMMERCIAL

## 2024-08-26 VITALS
TEMPERATURE: 97.9 F | SYSTOLIC BLOOD PRESSURE: 97 MMHG | OXYGEN SATURATION: 97 % | HEART RATE: 74 BPM | RESPIRATION RATE: 12 BRPM | DIASTOLIC BLOOD PRESSURE: 59 MMHG

## 2024-08-26 DIAGNOSIS — R20.3 HYPERESTHESIA: Primary | ICD-10-CM

## 2024-08-26 PROCEDURE — 96365 THER/PROPH/DIAG IV INF INIT: CPT

## 2024-08-26 PROCEDURE — 250N000011 HC RX IP 250 OP 636: Performed by: PSYCHIATRY & NEUROLOGY

## 2024-08-26 PROCEDURE — 258N000003 HC RX IP 258 OP 636: Performed by: PSYCHIATRY & NEUROLOGY

## 2024-08-26 RX ORDER — MEPERIDINE HYDROCHLORIDE 25 MG/ML
25 INJECTION INTRAMUSCULAR; INTRAVENOUS; SUBCUTANEOUS EVERY 30 MIN PRN
Status: CANCELLED | OUTPATIENT
Start: 2024-09-09

## 2024-08-26 RX ORDER — DIPHENHYDRAMINE HYDROCHLORIDE 50 MG/ML
50 INJECTION INTRAMUSCULAR; INTRAVENOUS
Status: DISCONTINUED | OUTPATIENT
Start: 2024-08-26 | End: 2024-08-26 | Stop reason: HOSPADM

## 2024-08-26 RX ORDER — METHYLPREDNISOLONE SODIUM SUCCINATE 125 MG/2ML
125 INJECTION, POWDER, LYOPHILIZED, FOR SOLUTION INTRAMUSCULAR; INTRAVENOUS
Status: DISCONTINUED | OUTPATIENT
Start: 2024-08-26 | End: 2024-08-26 | Stop reason: HOSPADM

## 2024-08-26 RX ORDER — HEPARIN SODIUM (PORCINE) LOCK FLUSH IV SOLN 100 UNIT/ML 100 UNIT/ML
5 SOLUTION INTRAVENOUS
Status: CANCELLED | OUTPATIENT
Start: 2024-09-09

## 2024-08-26 RX ORDER — METHYLPREDNISOLONE SODIUM SUCCINATE 125 MG/2ML
125 INJECTION, POWDER, LYOPHILIZED, FOR SOLUTION INTRAMUSCULAR; INTRAVENOUS
Status: CANCELLED
Start: 2024-09-09

## 2024-08-26 RX ORDER — EPINEPHRINE 1 MG/ML
0.3 INJECTION, SOLUTION INTRAMUSCULAR; SUBCUTANEOUS EVERY 5 MIN PRN
Status: CANCELLED | OUTPATIENT
Start: 2024-09-09

## 2024-08-26 RX ORDER — DIPHENHYDRAMINE HYDROCHLORIDE 50 MG/ML
50 INJECTION INTRAMUSCULAR; INTRAVENOUS
Status: CANCELLED
Start: 2024-09-09

## 2024-08-26 RX ORDER — ALBUTEROL SULFATE 90 UG/1
1-2 AEROSOL, METERED RESPIRATORY (INHALATION)
Status: CANCELLED
Start: 2024-09-09

## 2024-08-26 RX ORDER — EPINEPHRINE 1 MG/ML
0.3 INJECTION, SOLUTION INTRAMUSCULAR; SUBCUTANEOUS EVERY 5 MIN PRN
Status: DISCONTINUED | OUTPATIENT
Start: 2024-08-26 | End: 2024-08-26 | Stop reason: HOSPADM

## 2024-08-26 RX ORDER — HEPARIN SODIUM,PORCINE 10 UNIT/ML
5-20 VIAL (ML) INTRAVENOUS DAILY PRN
Status: CANCELLED | OUTPATIENT
Start: 2024-09-09

## 2024-08-26 RX ORDER — ALBUTEROL SULFATE 0.83 MG/ML
2.5 SOLUTION RESPIRATORY (INHALATION)
Status: CANCELLED | OUTPATIENT
Start: 2024-09-09

## 2024-08-26 RX ADMIN — SODIUM CHLORIDE 400 MG: 9 INJECTION, SOLUTION INTRAVENOUS at 12:01

## 2024-08-26 RX ADMIN — SODIUM CHLORIDE 250 ML: 9 INJECTION, SOLUTION INTRAVENOUS at 11:39

## 2024-08-26 NOTE — PROGRESS NOTES
Infusion Nursing Note:  Rose Ronquillo presents today for methylprednisolone infusion.    Patient seen by provider today: No   present during visit today: Not Applicable.    Note: Pt arrives ambulatory by herself for infusion. VSS, pt denies any pain or other symptoms.      Intravenous Access:  Peripheral IV placed.    Treatment Conditions:  Not Applicable.      Post Infusion Assessment:  Patient tolerated infusion without incident.  Site patent and intact, free from redness, edema or discomfort.  No evidence of extravasations.  Access discontinued per protocol.       Discharge Plan:   Patient and/or family verbalized understanding of discharge instructions and all questions answered.  AVS to patient via [employer].  Patient will return 10/09/24 for next appointment.   Patient discharged in stable condition accompanied by: self.  Departure Mode: Ambulatory.      Diane Meehan RN

## 2024-09-09 ENCOUNTER — INFUSION THERAPY VISIT (OUTPATIENT)
Dept: INFUSION THERAPY | Facility: HOSPITAL | Age: 34
End: 2024-09-09
Payer: COMMERCIAL

## 2024-09-09 VITALS
RESPIRATION RATE: 18 BRPM | SYSTOLIC BLOOD PRESSURE: 119 MMHG | HEART RATE: 82 BPM | DIASTOLIC BLOOD PRESSURE: 55 MMHG | TEMPERATURE: 98.2 F | OXYGEN SATURATION: 98 %

## 2024-09-09 DIAGNOSIS — R20.3 HYPERESTHESIA: Primary | ICD-10-CM

## 2024-09-09 PROCEDURE — 250N000011 HC RX IP 250 OP 636: Performed by: PSYCHIATRY & NEUROLOGY

## 2024-09-09 PROCEDURE — 258N000003 HC RX IP 258 OP 636: Performed by: PSYCHIATRY & NEUROLOGY

## 2024-09-09 PROCEDURE — 96365 THER/PROPH/DIAG IV INF INIT: CPT

## 2024-09-09 RX ORDER — ALBUTEROL SULFATE 90 UG/1
1-2 AEROSOL, METERED RESPIRATORY (INHALATION)
Status: CANCELLED
Start: 2024-09-23

## 2024-09-09 RX ORDER — EPINEPHRINE 1 MG/ML
0.3 INJECTION, SOLUTION INTRAMUSCULAR; SUBCUTANEOUS EVERY 5 MIN PRN
Status: DISCONTINUED | OUTPATIENT
Start: 2024-09-09 | End: 2024-09-09 | Stop reason: HOSPADM

## 2024-09-09 RX ORDER — HEPARIN SODIUM,PORCINE 10 UNIT/ML
5-20 VIAL (ML) INTRAVENOUS DAILY PRN
Status: CANCELLED | OUTPATIENT
Start: 2024-09-23

## 2024-09-09 RX ORDER — METHYLPREDNISOLONE SODIUM SUCCINATE 125 MG/2ML
125 INJECTION, POWDER, LYOPHILIZED, FOR SOLUTION INTRAMUSCULAR; INTRAVENOUS
Status: CANCELLED
Start: 2024-09-23

## 2024-09-09 RX ORDER — MEPERIDINE HYDROCHLORIDE 25 MG/ML
25 INJECTION INTRAMUSCULAR; INTRAVENOUS; SUBCUTANEOUS EVERY 30 MIN PRN
Status: CANCELLED | OUTPATIENT
Start: 2024-09-23

## 2024-09-09 RX ORDER — METHYLPREDNISOLONE SODIUM SUCCINATE 125 MG/2ML
125 INJECTION, POWDER, LYOPHILIZED, FOR SOLUTION INTRAMUSCULAR; INTRAVENOUS
Status: DISCONTINUED | OUTPATIENT
Start: 2024-09-09 | End: 2024-09-09 | Stop reason: HOSPADM

## 2024-09-09 RX ORDER — DOXYCYCLINE 100 MG/1
100 CAPSULE ORAL 2 TIMES DAILY
COMMUNITY

## 2024-09-09 RX ORDER — HEPARIN SODIUM (PORCINE) LOCK FLUSH IV SOLN 100 UNIT/ML 100 UNIT/ML
5 SOLUTION INTRAVENOUS
Status: CANCELLED | OUTPATIENT
Start: 2024-09-23

## 2024-09-09 RX ORDER — ALBUTEROL SULFATE 90 UG/1
1-2 AEROSOL, METERED RESPIRATORY (INHALATION)
Status: DISCONTINUED | OUTPATIENT
Start: 2024-09-09 | End: 2024-09-09 | Stop reason: HOSPADM

## 2024-09-09 RX ORDER — MEPERIDINE HYDROCHLORIDE 25 MG/ML
25 INJECTION INTRAMUSCULAR; INTRAVENOUS; SUBCUTANEOUS EVERY 30 MIN PRN
Status: DISCONTINUED | OUTPATIENT
Start: 2024-09-09 | End: 2024-09-09 | Stop reason: HOSPADM

## 2024-09-09 RX ORDER — EPINEPHRINE 1 MG/ML
0.3 INJECTION, SOLUTION INTRAMUSCULAR; SUBCUTANEOUS EVERY 5 MIN PRN
Status: CANCELLED | OUTPATIENT
Start: 2024-09-23

## 2024-09-09 RX ORDER — ALBUTEROL SULFATE 0.83 MG/ML
2.5 SOLUTION RESPIRATORY (INHALATION)
Status: CANCELLED | OUTPATIENT
Start: 2024-09-23

## 2024-09-09 RX ORDER — DIPHENHYDRAMINE HYDROCHLORIDE 50 MG/ML
50 INJECTION INTRAMUSCULAR; INTRAVENOUS
Status: CANCELLED
Start: 2024-09-23

## 2024-09-09 RX ORDER — ALBUTEROL SULFATE 0.83 MG/ML
2.5 SOLUTION RESPIRATORY (INHALATION)
Status: DISCONTINUED | OUTPATIENT
Start: 2024-09-09 | End: 2024-09-09 | Stop reason: HOSPADM

## 2024-09-09 RX ORDER — DIPHENHYDRAMINE HYDROCHLORIDE 50 MG/ML
50 INJECTION INTRAMUSCULAR; INTRAVENOUS
Status: DISCONTINUED | OUTPATIENT
Start: 2024-09-09 | End: 2024-09-09 | Stop reason: HOSPADM

## 2024-09-09 RX ADMIN — SODIUM CHLORIDE 250 ML: 9 INJECTION, SOLUTION INTRAVENOUS at 11:56

## 2024-09-09 RX ADMIN — SODIUM CHLORIDE 400 MG: 9 INJECTION, SOLUTION INTRAVENOUS at 11:56

## 2024-09-09 ASSESSMENT — PAIN SCALES - GENERAL: PAINLEVEL: NO PAIN (0)

## 2024-09-23 ENCOUNTER — INFUSION THERAPY VISIT (OUTPATIENT)
Dept: INFUSION THERAPY | Facility: HOSPITAL | Age: 34
End: 2024-09-23
Payer: COMMERCIAL

## 2024-09-23 VITALS
DIASTOLIC BLOOD PRESSURE: 61 MMHG | TEMPERATURE: 98.6 F | HEART RATE: 76 BPM | OXYGEN SATURATION: 97 % | SYSTOLIC BLOOD PRESSURE: 122 MMHG | RESPIRATION RATE: 16 BRPM

## 2024-09-23 DIAGNOSIS — R20.3 HYPERESTHESIA: Primary | ICD-10-CM

## 2024-09-23 PROCEDURE — 258N000003 HC RX IP 258 OP 636: Performed by: PSYCHIATRY & NEUROLOGY

## 2024-09-23 PROCEDURE — 250N000011 HC RX IP 250 OP 636: Performed by: PSYCHIATRY & NEUROLOGY

## 2024-09-23 PROCEDURE — 96365 THER/PROPH/DIAG IV INF INIT: CPT

## 2024-09-23 RX ORDER — METHYLPREDNISOLONE SODIUM SUCCINATE 125 MG/2ML
125 INJECTION, POWDER, LYOPHILIZED, FOR SOLUTION INTRAMUSCULAR; INTRAVENOUS
Status: CANCELLED
Start: 2024-09-23

## 2024-09-23 RX ORDER — DIPHENHYDRAMINE HYDROCHLORIDE 50 MG/ML
50 INJECTION INTRAMUSCULAR; INTRAVENOUS
Status: CANCELLED
Start: 2024-09-23

## 2024-09-23 RX ORDER — ALBUTEROL SULFATE 90 UG/1
1-2 AEROSOL, METERED RESPIRATORY (INHALATION)
Status: CANCELLED
Start: 2024-09-23

## 2024-09-23 RX ORDER — ALBUTEROL SULFATE 0.83 MG/ML
2.5 SOLUTION RESPIRATORY (INHALATION)
Status: CANCELLED | OUTPATIENT
Start: 2024-09-23

## 2024-09-23 RX ORDER — EPINEPHRINE 1 MG/ML
0.3 INJECTION, SOLUTION INTRAMUSCULAR; SUBCUTANEOUS EVERY 5 MIN PRN
Status: CANCELLED | OUTPATIENT
Start: 2024-09-23

## 2024-09-23 RX ORDER — HEPARIN SODIUM,PORCINE 10 UNIT/ML
5-20 VIAL (ML) INTRAVENOUS DAILY PRN
Status: CANCELLED | OUTPATIENT
Start: 2024-09-23

## 2024-09-23 RX ORDER — HEPARIN SODIUM (PORCINE) LOCK FLUSH IV SOLN 100 UNIT/ML 100 UNIT/ML
5 SOLUTION INTRAVENOUS
Status: CANCELLED | OUTPATIENT
Start: 2024-09-23

## 2024-09-23 RX ORDER — MEPERIDINE HYDROCHLORIDE 25 MG/ML
25 INJECTION INTRAMUSCULAR; INTRAVENOUS; SUBCUTANEOUS EVERY 30 MIN PRN
Status: CANCELLED | OUTPATIENT
Start: 2024-09-23

## 2024-09-23 RX ADMIN — SODIUM CHLORIDE 250 ML: 9 INJECTION, SOLUTION INTRAVENOUS at 11:50

## 2024-09-23 RX ADMIN — SODIUM CHLORIDE 400 MG: 9 INJECTION, SOLUTION INTRAVENOUS at 11:54

## 2024-09-23 NOTE — PROGRESS NOTES
Infusion Nursing Note:  Rose Ronquillo presents today for Methylprednisolone.    Patient seen by provider today: No   present during visit today: Not Applicable.    Note: Rose arrived ambulatory in stable condition. She states she has been feeling better now that the weather is humid. Methylprednisolone infused over 15 minutes and tolerated well.  She is scheduled to return for her next infusion 10/7/24.       Intravenous Access:  Peripheral IV placed.    Treatment Conditions:  Not Applicable.      Post Infusion Assessment:  Patient tolerated infusion without incident.  Site patent and intact, free from redness, edema or discomfort.  Access discontinued per protocol.       Discharge Plan:   Patient discharged in stable condition accompanied by: self.  Departure Mode: Ambulatory.      Rita Chinchilla RN

## 2024-10-03 DIAGNOSIS — R20.3 HYPERESTHESIA: Primary | ICD-10-CM

## 2024-10-03 RX ORDER — DIPHENHYDRAMINE HYDROCHLORIDE 50 MG/ML
50 INJECTION INTRAMUSCULAR; INTRAVENOUS
Status: CANCELLED
Start: 2024-10-07

## 2024-10-03 RX ORDER — EPINEPHRINE 1 MG/ML
0.3 INJECTION, SOLUTION, CONCENTRATE INTRAVENOUS EVERY 5 MIN PRN
Status: CANCELLED | OUTPATIENT
Start: 2024-10-07

## 2024-10-03 RX ORDER — ALBUTEROL SULFATE 90 UG/1
1-2 INHALANT RESPIRATORY (INHALATION)
Status: CANCELLED
Start: 2024-10-07

## 2024-10-03 RX ORDER — HEPARIN SODIUM,PORCINE 10 UNIT/ML
5-20 VIAL (ML) INTRAVENOUS DAILY PRN
Status: CANCELLED | OUTPATIENT
Start: 2024-10-07

## 2024-10-03 RX ORDER — MEPERIDINE HYDROCHLORIDE 25 MG/ML
25 INJECTION INTRAMUSCULAR; INTRAVENOUS; SUBCUTANEOUS EVERY 30 MIN PRN
Status: CANCELLED | OUTPATIENT
Start: 2024-10-07

## 2024-10-03 RX ORDER — HEPARIN SODIUM (PORCINE) LOCK FLUSH IV SOLN 100 UNIT/ML 100 UNIT/ML
5 SOLUTION INTRAVENOUS
Status: CANCELLED | OUTPATIENT
Start: 2024-10-07

## 2024-10-03 RX ORDER — ALBUTEROL SULFATE 0.83 MG/ML
2.5 SOLUTION RESPIRATORY (INHALATION)
Status: CANCELLED | OUTPATIENT
Start: 2024-10-07

## 2024-10-03 RX ORDER — METHYLPREDNISOLONE SODIUM SUCCINATE 125 MG/2ML
125 INJECTION INTRAMUSCULAR; INTRAVENOUS
Status: CANCELLED
Start: 2024-10-07

## 2024-10-19 DIAGNOSIS — R20.3 HYPERESTHESIA: Primary | ICD-10-CM

## 2024-10-19 RX ORDER — METHYLPREDNISOLONE SODIUM SUCCINATE 125 MG/2ML
125 INJECTION INTRAMUSCULAR; INTRAVENOUS
Status: CANCELLED
Start: 2024-10-21

## 2024-10-19 RX ORDER — ALBUTEROL SULFATE 0.83 MG/ML
2.5 SOLUTION RESPIRATORY (INHALATION)
Status: CANCELLED | OUTPATIENT
Start: 2024-10-21

## 2024-10-19 RX ORDER — HEPARIN SODIUM (PORCINE) LOCK FLUSH IV SOLN 100 UNIT/ML 100 UNIT/ML
5 SOLUTION INTRAVENOUS
Status: CANCELLED | OUTPATIENT
Start: 2024-10-21

## 2024-10-19 RX ORDER — ALBUTEROL SULFATE 90 UG/1
1-2 INHALANT RESPIRATORY (INHALATION)
Status: CANCELLED
Start: 2024-10-21

## 2024-10-19 RX ORDER — DIPHENHYDRAMINE HYDROCHLORIDE 50 MG/ML
50 INJECTION INTRAMUSCULAR; INTRAVENOUS
Status: CANCELLED
Start: 2024-10-21

## 2024-10-19 RX ORDER — MEPERIDINE HYDROCHLORIDE 25 MG/ML
25 INJECTION INTRAMUSCULAR; INTRAVENOUS; SUBCUTANEOUS EVERY 30 MIN PRN
Status: CANCELLED | OUTPATIENT
Start: 2024-10-21

## 2024-10-19 RX ORDER — HEPARIN SODIUM,PORCINE 10 UNIT/ML
5-20 VIAL (ML) INTRAVENOUS DAILY PRN
Status: CANCELLED | OUTPATIENT
Start: 2024-10-21

## 2024-10-19 RX ORDER — EPINEPHRINE 1 MG/ML
0.3 INJECTION, SOLUTION, CONCENTRATE INTRAVENOUS EVERY 5 MIN PRN
Status: CANCELLED | OUTPATIENT
Start: 2024-10-21

## 2024-10-21 ENCOUNTER — INFUSION THERAPY VISIT (OUTPATIENT)
Dept: INFUSION THERAPY | Facility: HOSPITAL | Age: 34
End: 2024-10-21
Payer: COMMERCIAL

## 2024-10-21 VITALS
SYSTOLIC BLOOD PRESSURE: 106 MMHG | HEART RATE: 67 BPM | DIASTOLIC BLOOD PRESSURE: 60 MMHG | OXYGEN SATURATION: 97 % | TEMPERATURE: 97.6 F | RESPIRATION RATE: 16 BRPM

## 2024-10-21 DIAGNOSIS — R20.3 HYPERESTHESIA: Primary | ICD-10-CM

## 2024-10-21 PROCEDURE — 96365 THER/PROPH/DIAG IV INF INIT: CPT

## 2024-10-21 PROCEDURE — 258N000003 HC RX IP 258 OP 636: Performed by: PSYCHIATRY & NEUROLOGY

## 2024-10-21 PROCEDURE — 250N000011 HC RX IP 250 OP 636: Performed by: PSYCHIATRY & NEUROLOGY

## 2024-10-21 RX ORDER — DIPHENHYDRAMINE HYDROCHLORIDE 50 MG/ML
50 INJECTION INTRAMUSCULAR; INTRAVENOUS
Status: CANCELLED
Start: 2024-11-04

## 2024-10-21 RX ORDER — EPINEPHRINE 1 MG/ML
0.3 INJECTION, SOLUTION INTRAMUSCULAR; SUBCUTANEOUS EVERY 5 MIN PRN
Status: DISCONTINUED | OUTPATIENT
Start: 2024-10-21 | End: 2024-10-21 | Stop reason: HOSPADM

## 2024-10-21 RX ORDER — MEPERIDINE HYDROCHLORIDE 25 MG/ML
25 INJECTION INTRAMUSCULAR; INTRAVENOUS; SUBCUTANEOUS EVERY 30 MIN PRN
Status: CANCELLED | OUTPATIENT
Start: 2024-11-04

## 2024-10-21 RX ORDER — EPINEPHRINE 1 MG/ML
0.3 INJECTION, SOLUTION INTRAMUSCULAR; SUBCUTANEOUS EVERY 5 MIN PRN
Status: CANCELLED | OUTPATIENT
Start: 2024-11-04

## 2024-10-21 RX ORDER — HEPARIN SODIUM (PORCINE) LOCK FLUSH IV SOLN 100 UNIT/ML 100 UNIT/ML
5 SOLUTION INTRAVENOUS
Status: CANCELLED | OUTPATIENT
Start: 2024-11-04

## 2024-10-21 RX ORDER — HEPARIN SODIUM,PORCINE 10 UNIT/ML
5-20 VIAL (ML) INTRAVENOUS DAILY PRN
Status: CANCELLED | OUTPATIENT
Start: 2024-11-04

## 2024-10-21 RX ORDER — METHYLPREDNISOLONE SODIUM SUCCINATE 125 MG/2ML
125 INJECTION INTRAMUSCULAR; INTRAVENOUS
Status: DISCONTINUED | OUTPATIENT
Start: 2024-10-21 | End: 2024-10-21 | Stop reason: HOSPADM

## 2024-10-21 RX ORDER — MEPERIDINE HYDROCHLORIDE 25 MG/ML
25 INJECTION INTRAMUSCULAR; INTRAVENOUS; SUBCUTANEOUS EVERY 30 MIN PRN
Status: DISCONTINUED | OUTPATIENT
Start: 2024-10-21 | End: 2024-10-21 | Stop reason: HOSPADM

## 2024-10-21 RX ORDER — DIPHENHYDRAMINE HYDROCHLORIDE 50 MG/ML
50 INJECTION INTRAMUSCULAR; INTRAVENOUS
Status: DISCONTINUED | OUTPATIENT
Start: 2024-10-21 | End: 2024-10-21 | Stop reason: HOSPADM

## 2024-10-21 RX ORDER — METHYLPREDNISOLONE SODIUM SUCCINATE 125 MG/2ML
125 INJECTION INTRAMUSCULAR; INTRAVENOUS
Status: CANCELLED
Start: 2024-11-04

## 2024-10-21 RX ORDER — ALBUTEROL SULFATE 90 UG/1
1-2 INHALANT RESPIRATORY (INHALATION)
Status: DISCONTINUED | OUTPATIENT
Start: 2024-10-21 | End: 2024-10-21 | Stop reason: HOSPADM

## 2024-10-21 RX ORDER — ALBUTEROL SULFATE 0.83 MG/ML
2.5 SOLUTION RESPIRATORY (INHALATION)
Status: DISCONTINUED | OUTPATIENT
Start: 2024-10-21 | End: 2024-10-21 | Stop reason: HOSPADM

## 2024-10-21 RX ORDER — ALBUTEROL SULFATE 90 UG/1
1-2 INHALANT RESPIRATORY (INHALATION)
Status: CANCELLED
Start: 2024-11-04

## 2024-10-21 RX ORDER — ALBUTEROL SULFATE 0.83 MG/ML
2.5 SOLUTION RESPIRATORY (INHALATION)
Status: CANCELLED | OUTPATIENT
Start: 2024-11-04

## 2024-10-21 RX ADMIN — SODIUM CHLORIDE 400 MG: 9 INJECTION, SOLUTION INTRAVENOUS at 11:41

## 2024-10-21 ASSESSMENT — PAIN SCALES - GENERAL: PAINLEVEL: MILD PAIN (2)

## 2024-10-21 NOTE — PROGRESS NOTES
Infusion Nursing Note:  Rose Ronquillo presents today for Infusion Methylprednisolone 400 mg.    Patient seen by provider today: No   present during visit today: Not Applicable.    Note: Rose arrived into the infusion center ambulatory in a stable condition for high dose Methylprednisolone, VSS BP on the low side. Plan of care reviewed with her. She verbalized understanding of her plan of care and return to clinic. She complained of flare up and stiffness in her hands and noted she missed only one dose because of the fluid issues.     Intravenous Access:  Peripheral IV placed.    Treatment Conditions:  Not Applicable.    Post Infusion Assessment:  Patient tolerated infusion without incident.  Site patent and intact, free from redness, edema or discomfort.  No evidence of extravasations.  Access discontinued per protocol.     Discharge Plan:   Discharge instructions reviewed with: Patient.  Patient and/or family verbalized understanding of discharge instructions and all questions answered.  Patient discharged in stable condition accompanied by: self.  Departure Mode: Ambulatory.    Gabriella Dang RN   
Other

## 2024-11-04 ENCOUNTER — INFUSION THERAPY VISIT (OUTPATIENT)
Dept: INFUSION THERAPY | Facility: HOSPITAL | Age: 34
End: 2024-11-04
Payer: COMMERCIAL

## 2024-11-04 VITALS
DIASTOLIC BLOOD PRESSURE: 57 MMHG | OXYGEN SATURATION: 97 % | SYSTOLIC BLOOD PRESSURE: 107 MMHG | RESPIRATION RATE: 16 BRPM | TEMPERATURE: 97.8 F | HEART RATE: 79 BPM

## 2024-11-04 DIAGNOSIS — R20.3 HYPERESTHESIA: Primary | ICD-10-CM

## 2024-11-04 PROCEDURE — 258N000003 HC RX IP 258 OP 636: Performed by: PSYCHIATRY & NEUROLOGY

## 2024-11-04 PROCEDURE — 96365 THER/PROPH/DIAG IV INF INIT: CPT

## 2024-11-04 PROCEDURE — 250N000011 HC RX IP 250 OP 636: Mod: JW | Performed by: PSYCHIATRY & NEUROLOGY

## 2024-11-04 RX ORDER — HEPARIN SODIUM,PORCINE 10 UNIT/ML
5-20 VIAL (ML) INTRAVENOUS DAILY PRN
OUTPATIENT
Start: 2024-11-18

## 2024-11-04 RX ORDER — HEPARIN SODIUM (PORCINE) LOCK FLUSH IV SOLN 100 UNIT/ML 100 UNIT/ML
5 SOLUTION INTRAVENOUS
OUTPATIENT
Start: 2024-11-18

## 2024-11-04 RX ADMIN — SODIUM CHLORIDE 400 MG: 9 INJECTION, SOLUTION INTRAVENOUS at 12:17

## 2024-11-18 ENCOUNTER — INFUSION THERAPY VISIT (OUTPATIENT)
Dept: INFUSION THERAPY | Facility: HOSPITAL | Age: 34
End: 2024-11-18
Payer: COMMERCIAL

## 2024-11-18 VITALS
HEART RATE: 80 BPM | OXYGEN SATURATION: 98 % | SYSTOLIC BLOOD PRESSURE: 100 MMHG | TEMPERATURE: 97.7 F | DIASTOLIC BLOOD PRESSURE: 59 MMHG | RESPIRATION RATE: 18 BRPM

## 2024-11-18 DIAGNOSIS — R20.3 HYPERESTHESIA: Primary | ICD-10-CM

## 2024-11-18 PROCEDURE — 250N000011 HC RX IP 250 OP 636: Performed by: PSYCHIATRY & NEUROLOGY

## 2024-11-18 PROCEDURE — 258N000003 HC RX IP 258 OP 636: Performed by: PSYCHIATRY & NEUROLOGY

## 2024-11-18 PROCEDURE — 96365 THER/PROPH/DIAG IV INF INIT: CPT

## 2024-11-18 RX ORDER — HEPARIN SODIUM,PORCINE 10 UNIT/ML
5-20 VIAL (ML) INTRAVENOUS DAILY PRN
OUTPATIENT
Start: 2024-12-02

## 2024-11-18 RX ORDER — HEPARIN SODIUM (PORCINE) LOCK FLUSH IV SOLN 100 UNIT/ML 100 UNIT/ML
5 SOLUTION INTRAVENOUS
OUTPATIENT
Start: 2024-12-02

## 2024-11-18 RX ADMIN — SODIUM CHLORIDE 400 MG: 9 INJECTION, SOLUTION INTRAVENOUS at 12:30

## 2024-11-18 ASSESSMENT — PAIN SCALES - GENERAL: PAINLEVEL_OUTOF10: NO PAIN (0)

## 2024-12-02 ENCOUNTER — MYC MEDICAL ADVICE (OUTPATIENT)
Dept: NEUROLOGY | Facility: CLINIC | Age: 34
End: 2024-12-02
Payer: COMMERCIAL

## 2024-12-02 NOTE — TELEPHONE ENCOUNTER
Spoke to Dr. Rajwinder Colvin and Provider ok'd to do virtual visit. Will put patient back onto her original appt on 12/4 @ 9:30am as virtual. Patient is aware and confirmed understanding.     Jeronimo 12/2/2024 2:14 PM

## 2024-12-03 ENCOUNTER — INFUSION THERAPY VISIT (OUTPATIENT)
Dept: INFUSION THERAPY | Facility: HOSPITAL | Age: 34
End: 2024-12-03
Attending: PSYCHIATRY & NEUROLOGY
Payer: COMMERCIAL

## 2024-12-03 VITALS
OXYGEN SATURATION: 100 % | TEMPERATURE: 97.5 F | DIASTOLIC BLOOD PRESSURE: 57 MMHG | HEART RATE: 100 BPM | SYSTOLIC BLOOD PRESSURE: 96 MMHG | RESPIRATION RATE: 16 BRPM

## 2024-12-03 DIAGNOSIS — R20.3 HYPERESTHESIA: Primary | ICD-10-CM

## 2024-12-03 PROCEDURE — 96365 THER/PROPH/DIAG IV INF INIT: CPT

## 2024-12-03 PROCEDURE — 258N000003 HC RX IP 258 OP 636: Performed by: PSYCHIATRY & NEUROLOGY

## 2024-12-03 PROCEDURE — 250N000011 HC RX IP 250 OP 636: Performed by: PSYCHIATRY & NEUROLOGY

## 2024-12-03 RX ORDER — HEPARIN SODIUM,PORCINE 10 UNIT/ML
5-20 VIAL (ML) INTRAVENOUS DAILY PRN
OUTPATIENT
Start: 2024-12-16

## 2024-12-03 RX ORDER — HEPARIN SODIUM (PORCINE) LOCK FLUSH IV SOLN 100 UNIT/ML 100 UNIT/ML
5 SOLUTION INTRAVENOUS
OUTPATIENT
Start: 2024-12-16

## 2024-12-03 RX ADMIN — SODIUM CHLORIDE 400 MG: 9 INJECTION, SOLUTION INTRAVENOUS at 08:26

## 2024-12-03 NOTE — PROGRESS NOTES
Infusion Nursing Note:  Rose Ronquillo presents today for Methylprednisolone.    Patient seen by provider today: No   present during visit today: Not Applicable.    Note: Rose arrived ambulatory into the infusion center for Methylprednisolone infusion in a stable condition, VSS. Plan of care reviewed, she verbalized understanding of her plan of care and return to clinic. Rose states she thinks she had food poisoning yesterday and that is why she re-scheduled to today. She states she is improved quite a bit, mostly experienced intense vomiting.      Intravenous Access:  Peripheral IV R FA    Treatment Conditions:  Not Applicable.    Post Infusion Assessment:  Patient tolerated infusion without incident.  Site patent and intact, free from redness, edema or discomfort.  No evidence of extravasations.  Access discontinued per protocol.     Discharge Plan:   Discharge instructions reviewed with: Patient.  Patient and/or family verbalized understanding of discharge instructions and all questions answered.  Patient discharged in stable condition accompanied by: self.  Departure Mode: Ambulatory.      JUANITA Chen

## 2024-12-05 NOTE — TELEPHONE ENCOUNTER
Please let Rose know that I am sorry about having to reschedule her appointment.  In the meantime, I am not sure what the explanation is for her weight loss.  I do not think it is related to our treatments so I think she should start to work with her primary physician regarding this for full workup.    Thank you,  Dr. Colvin

## 2024-12-12 ENCOUNTER — MYC MEDICAL ADVICE (OUTPATIENT)
Dept: ORTHOPEDICS | Facility: CLINIC | Age: 34
End: 2024-12-12
Payer: COMMERCIAL

## 2024-12-12 NOTE — TELEPHONE ENCOUNTER
Left Voicemail (1st Attempt) and Sent Mychart (1st Attempt) for the patient to call back and schedule the following:    Appointment type: Return Surgical SPine  Provider: Dr. Wong Culp  Visit mode: In Person  Return date: Approx. Next avail JAVIER spot - make 30 minutes  Specialty phone number: [phone]    Additional Notes:

## 2024-12-16 ENCOUNTER — INFUSION THERAPY VISIT (OUTPATIENT)
Dept: INFUSION THERAPY | Facility: HOSPITAL | Age: 34
End: 2024-12-16
Attending: PSYCHIATRY & NEUROLOGY
Payer: COMMERCIAL

## 2024-12-16 ENCOUNTER — MYC MEDICAL ADVICE (OUTPATIENT)
Dept: ORTHOPEDICS | Facility: CLINIC | Age: 34
End: 2024-12-16
Payer: COMMERCIAL

## 2024-12-16 VITALS
OXYGEN SATURATION: 100 % | TEMPERATURE: 97.4 F | SYSTOLIC BLOOD PRESSURE: 103 MMHG | HEART RATE: 65 BPM | RESPIRATION RATE: 16 BRPM | DIASTOLIC BLOOD PRESSURE: 57 MMHG

## 2024-12-16 DIAGNOSIS — R20.3 HYPERESTHESIA: Primary | ICD-10-CM

## 2024-12-16 PROCEDURE — 258N000003 HC RX IP 258 OP 636: Performed by: PSYCHIATRY & NEUROLOGY

## 2024-12-16 PROCEDURE — 250N000011 HC RX IP 250 OP 636: Performed by: PSYCHIATRY & NEUROLOGY

## 2024-12-16 PROCEDURE — 96365 THER/PROPH/DIAG IV INF INIT: CPT

## 2024-12-16 RX ORDER — HEPARIN SODIUM,PORCINE 10 UNIT/ML
5-20 VIAL (ML) INTRAVENOUS DAILY PRN
OUTPATIENT
Start: 2024-12-30

## 2024-12-16 RX ORDER — HEPARIN SODIUM (PORCINE) LOCK FLUSH IV SOLN 100 UNIT/ML 100 UNIT/ML
5 SOLUTION INTRAVENOUS
OUTPATIENT
Start: 2024-12-30

## 2024-12-16 RX ADMIN — SODIUM CHLORIDE 400 MG: 9 INJECTION, SOLUTION INTRAVENOUS at 12:01

## 2024-12-16 NOTE — TELEPHONE ENCOUNTER
Left Voicemail (2nd Attempt) and Sent Mychart (2nd Attempt) for the patient to call back and schedule the following:    Appointment type: Return Surgical SPine  Provider: Dr. Wong Culp  Visit mode: In Person  Return date: Approx. Next avail JAVIER spot - make 30 minutes  MAX attempts made to reschedule

## 2024-12-30 ENCOUNTER — INFUSION THERAPY VISIT (OUTPATIENT)
Dept: INFUSION THERAPY | Facility: HOSPITAL | Age: 34
End: 2024-12-30
Attending: PSYCHIATRY & NEUROLOGY
Payer: COMMERCIAL

## 2024-12-30 VITALS
DIASTOLIC BLOOD PRESSURE: 60 MMHG | SYSTOLIC BLOOD PRESSURE: 93 MMHG | OXYGEN SATURATION: 99 % | TEMPERATURE: 97.9 F | RESPIRATION RATE: 16 BRPM | HEART RATE: 86 BPM

## 2024-12-30 DIAGNOSIS — R20.3 HYPERESTHESIA: Primary | ICD-10-CM

## 2024-12-30 PROCEDURE — 250N000011 HC RX IP 250 OP 636: Performed by: PSYCHIATRY & NEUROLOGY

## 2024-12-30 PROCEDURE — 96374 THER/PROPH/DIAG INJ IV PUSH: CPT

## 2024-12-30 PROCEDURE — 258N000003 HC RX IP 258 OP 636: Performed by: PSYCHIATRY & NEUROLOGY

## 2024-12-30 RX ORDER — HEPARIN SODIUM (PORCINE) LOCK FLUSH IV SOLN 100 UNIT/ML 100 UNIT/ML
5 SOLUTION INTRAVENOUS
OUTPATIENT
Start: 2025-01-13

## 2024-12-30 RX ORDER — HEPARIN SODIUM,PORCINE 10 UNIT/ML
5-20 VIAL (ML) INTRAVENOUS DAILY PRN
OUTPATIENT
Start: 2025-01-13

## 2024-12-30 RX ADMIN — SODIUM CHLORIDE 400 MG: 9 INJECTION, SOLUTION INTRAVENOUS at 12:23

## 2024-12-30 NOTE — PROGRESS NOTES
Infusion Nursing Note:  Rose Ronquillo presents today for Methylprednisolone infusion.    Patient seen by provider today: No   present during visit today: Not Applicable.    Note: Rose arrived ambulatory in stable condition. She reports having difficulty with the dexterity of her fingers for a few days recently. Methylprednisolone infused as ordered. Rose is due for her next infusion 1/13/24.      Intravenous Access:  Peripheral IV placed.    Treatment Conditions:  Not Applicable.      Post Infusion Assessment:  Patient tolerated infusion without incident.  Site patent and intact, free from redness, edema or discomfort.  Access discontinued per protocol.       Discharge Plan:   Patient discharged in stable condition accompanied by: self.  Departure Mode: Ambulatory.      Rita Chinchilla RN

## 2025-01-13 ENCOUNTER — INFUSION THERAPY VISIT (OUTPATIENT)
Dept: INFUSION THERAPY | Facility: HOSPITAL | Age: 35
End: 2025-01-13
Payer: COMMERCIAL

## 2025-01-13 VITALS
HEART RATE: 76 BPM | OXYGEN SATURATION: 100 % | SYSTOLIC BLOOD PRESSURE: 90 MMHG | RESPIRATION RATE: 16 BRPM | TEMPERATURE: 97.7 F | DIASTOLIC BLOOD PRESSURE: 66 MMHG

## 2025-01-13 DIAGNOSIS — R20.3 HYPERESTHESIA: Primary | ICD-10-CM

## 2025-01-13 PROCEDURE — 96365 THER/PROPH/DIAG IV INF INIT: CPT

## 2025-01-13 PROCEDURE — 258N000003 HC RX IP 258 OP 636: Performed by: PSYCHIATRY & NEUROLOGY

## 2025-01-13 PROCEDURE — 250N000011 HC RX IP 250 OP 636: Mod: JW | Performed by: PSYCHIATRY & NEUROLOGY

## 2025-01-13 RX ORDER — HEPARIN SODIUM,PORCINE 10 UNIT/ML
5-20 VIAL (ML) INTRAVENOUS DAILY PRN
OUTPATIENT
Start: 2025-01-27

## 2025-01-13 RX ORDER — HEPARIN SODIUM (PORCINE) LOCK FLUSH IV SOLN 100 UNIT/ML 100 UNIT/ML
5 SOLUTION INTRAVENOUS
OUTPATIENT
Start: 2025-01-27

## 2025-01-13 RX ADMIN — SODIUM CHLORIDE 400 MG: 9 INJECTION, SOLUTION INTRAVENOUS at 11:56

## 2025-01-14 ENCOUNTER — VIRTUAL VISIT (OUTPATIENT)
Dept: NEUROLOGY | Facility: CLINIC | Age: 35
End: 2025-01-14
Payer: COMMERCIAL

## 2025-01-14 VITALS — HEIGHT: 68 IN | BODY MASS INDEX: 18.34 KG/M2 | WEIGHT: 121 LBS

## 2025-01-14 DIAGNOSIS — M35.9 AUTOIMMUNE DISEASE: ICD-10-CM

## 2025-01-14 DIAGNOSIS — Z79.899 ENCOUNTER FOR LONG-TERM (CURRENT) USE OF MEDICATIONS: ICD-10-CM

## 2025-01-14 DIAGNOSIS — R20.3 HYPERESTHESIA: Primary | ICD-10-CM

## 2025-01-14 DIAGNOSIS — M62.89 PROXIMAL WEAKNESS OF EXTREMITY: ICD-10-CM

## 2025-01-14 PROCEDURE — 98006 SYNCH AUDIO-VIDEO EST MOD 30: CPT | Performed by: PSYCHIATRY & NEUROLOGY

## 2025-01-14 ASSESSMENT — PAIN SCALES - GENERAL: PAINLEVEL_OUTOF10: MILD PAIN (3)

## 2025-01-14 NOTE — Clinical Note
"1/14/2025      Rose Ronquillo  [address] [address]      Dear Colleague,    Thank you for referring your patient, Rose Ronquillo, to the [facility] [place]. Please see a copy of my visit note below.    Virtual Visit Details    Type of service:  Video Visit   Video Start Time: {video visit start/end time for provider to select:303163}  Video End Time:{video visit start/end time for provider to select:548783}    Originating Location (pt. Location): {video visit patient location:679299::\"Home\"}  {PROVIDER LOCATION On-site should be selected for visits conducted from your clinic location or adjoining [facility] hospital, academic office, or other nearby [facility] building. Off-site should be selected for all other provider locations, including home:911500}  Distant Location (provider location):  {virtual location provider:979208}  Platform used for Video Visit: {Virtual Visit Platforms:715793::\"Dr [employer]\"}    ESTABLISHED PATIENT NEUROLOGY NOTE    DATE OF VISIT: 1/14/2025  MRN: [id number]  PATIENT NAME: Rose Ronquillo  YOB: 1990    Chief Complaint   Patient presents with    RECHECK     SUBJECTIVE:                                                      HISTORY OF PRESENT ILLNESS:  Rose is here for follow up regarding acute onset weakness and paresthesias of unclear etiology. She has responded well to steroids, with variable dosing.     She has been losing weight and her primary care provider is concerned about atrophy. She says that when she works out, her strength seems normal, she is just more bony.       CURRENT MEDICATIONS:   Current Outpatient Medications   Medication Sig Dispense Refill    amLODIPine (NORVASC) 2.5 MG tablet TAKE 1 TABLET(2.5 MG) BY MOUTH DAILY (Patient not taking: Reported on 9/11/2023) 90 tablet 3    Calcium Carbonate Antacid (TUMS PO)       Cholecalciferol (VITAMIN D PO)       doxycycline monohydrate (MONODOX) 100 MG capsule Take 100 mg by mouth 2 " "times daily. End date 9/12/24      fish oil-omega-3 fatty acids (OMEGA-3 FISH OIL) 1000 MG capsule  (Patient not taking: Reported on 2/5/2024)      gabapentin (NEURONTIN) 100 MG capsule Take 100 mg by mouth Takes 400mg in the morning      gabapentin (NEURONTIN) 300 MG capsule Take 300 mg by mouth      ibuprofen (ADVIL,MOTRIN) 200 MG tablet Take 200 mg by mouth every 4 hours as needed      Ipratropium-Albuterol (COMBIVENT RESPIMAT)  MCG/ACT inhaler Inhale 1 puff into the lungs 4 times daily Not to exceed 6 doses per day. 1 Inhaler 0    modafinil (PROVIGIL) 100 MG tablet Take 1 tablet (100 mg) by mouth daily (Patient not taking: Reported on 10/9/2023) 30 tablet 5    Multiple Vitamin (MULTI-VITAMIN PO)  (Patient not taking: Reported on 8/28/2023)      nitroGLYcerin (NITRO-BID) 2 % OINT ointment Place 1 inch (15 mg) onto the skin every 24 hours Apply to affected area ( toes or fingers)  at night and avoid touching eyes or face . (Patient not taking: Reported on 8/28/2023) 30 g 5    sertraline (ZOLOFT) 25 MG tablet Take 1 tablet by mouth daily at 2 pm      UNABLE TO FIND 500 mg every 14 days MEDICATION NAME: Prednisone 250 mg infusion every other week.      VYVANSE 50 MG OR CAPS 1 CAPSULE DAILY      ZOLOFT 50 MG OR TABS I tablet daily 30 0     No current facility-administered medications for this visit.       RECENT DIAGNOSTIC STUDIES:   Labs: No results found for any visits on 01/14/25.    Imaging: ***    REVIEW OF SYSTEMS:                                                      10-point review of systems is negative except as mentioned above in HPI.    EXAM:                                                      Physical Exam:   Vitals: Ht 1.727 m (5' 8\")   Wt 54.9 kg (121 lb)   BMI 18.40 kg/m    BMI= Body mass index is 18.4 kg/m .  GENERAL: NAD.  HEENT: NC/AT.  CV: RRR. S1, S2.   NECK: No bruits.  PULM: Non-labored breathing.   Neurologic:  MENTAL STATUS: Alert, attentive. Speech is fluent. Normal comprehension. " Mini-cog: ***/5. Normal concentration. Adequate fund of knowledge.   CRANIAL NERVES: Discs flat. Visual fields intact to confrontation. Pupils equally, round and reactive to light. Facial sensation and movement normal. EOM full. Hearing intact to conversation. Trapezius strength intact. Palate moves symmetrically. Tongue midline.  MOTOR: 5/5 in proximal and distal muscle groups of upper and lower extremities. Tone and bulk normal.   DTRs: Intact and symmetric in biceps, BR, triceps and patellae. Ankle jerks***. Babinski down-going bilaterally.   SENSATION: Normal light touch and pinprick. Intact proprioception at great toes. Vibration: {tandem walkin} at both ankles.   COORDINATION: Normal finger nose finger. Finger tapping normal. Knee heel shin normal.  STATION AND GAIT: Romberg {POSITIVE:945809}. Good postural reflexes. Casual gait and tandem {tandem walkin}  *** hand-dominant.     ASSESSMENT and PLAN:                                                      Assessment:  No diagnosis found.    Plan:  There are no Patient Instructions on file for this visit.    Total Time: *** minutes were spent with the patient and in chart review/documentation (as described above in Assessment and Plan)/coordinating the care on date of service.    Yolande Colvin MD  Neurology    Dragon software used in the dictation of this note.                      Again, thank you for allowing me to participate in the care of your patient.        Sincerely,        Yolande Colvin MD    Electronically signed

## 2025-01-14 NOTE — NURSING NOTE
Current patient location: [place], [place]    Is the patient currently in the state of [place]? YES    Visit mode: VIDEO    If the visit is dropped, the patient can be reconnected by:VIDEO VISIT: Send to e-mail at: [email or web address]    Will anyone else be joining the visit? NO  (If patient encounters technical issues they should call [phone] :[phone])    Are changes needed to the allergy or medication list? Pt stated no changes to allergies and Pt stated no med changes    Are refills needed on medications prescribed by this physician? NO    Rooming Documentation:  Not applicable    Reason for visit: NANO ePrez VVF

## 2025-01-14 NOTE — PROGRESS NOTES
Virtual Visit Details    Type of service:  Video Visit   Video Duration: 16 minutes    Originating Location (pt. Location): Home    Distant Location (provider location):  On-site  Platform used for Video Visit: Young

## 2025-01-27 ENCOUNTER — INFUSION THERAPY VISIT (OUTPATIENT)
Dept: INFUSION THERAPY | Facility: HOSPITAL | Age: 35
End: 2025-01-27
Payer: COMMERCIAL

## 2025-01-27 VITALS
HEART RATE: 95 BPM | TEMPERATURE: 97.6 F | OXYGEN SATURATION: 96 % | SYSTOLIC BLOOD PRESSURE: 105 MMHG | RESPIRATION RATE: 16 BRPM | DIASTOLIC BLOOD PRESSURE: 65 MMHG

## 2025-01-27 DIAGNOSIS — R20.3 HYPERESTHESIA: Primary | ICD-10-CM

## 2025-01-27 PROCEDURE — 258N000003 HC RX IP 258 OP 636: Performed by: PSYCHIATRY & NEUROLOGY

## 2025-01-27 PROCEDURE — 96365 THER/PROPH/DIAG IV INF INIT: CPT

## 2025-01-27 PROCEDURE — 250N000011 HC RX IP 250 OP 636: Performed by: PSYCHIATRY & NEUROLOGY

## 2025-01-27 RX ORDER — HEPARIN SODIUM (PORCINE) LOCK FLUSH IV SOLN 100 UNIT/ML 100 UNIT/ML
5 SOLUTION INTRAVENOUS
OUTPATIENT
Start: 2025-01-27

## 2025-01-27 RX ORDER — HEPARIN SODIUM,PORCINE 10 UNIT/ML
5-20 VIAL (ML) INTRAVENOUS DAILY PRN
OUTPATIENT
Start: 2025-01-27

## 2025-01-27 RX ADMIN — SODIUM CHLORIDE 400 MG: 9 INJECTION, SOLUTION INTRAVENOUS at 11:57

## 2025-01-27 NOTE — PROGRESS NOTES
Infusion Nursing Note:  Rose Ronquillo presents today for methylprednisolone infusion.    Patient seen by provider today: No   present during visit today: Not Applicable.    Note: Rose arrived ambulatory in stable condition. Denies pain but states her hand dexterity gets worse the closer she gets to infusion day. Methylprednisolone infused over 15 minutes followed with NS flush. Rose is scheduled to return for her next dose 2/10/25.      Intravenous Access:  Peripheral IV placed.    Treatment Conditions:  Not Applicable.      Post Infusion Assessment:  Patient tolerated infusion without incident.  Site patent and intact, free from redness, edema or discomfort.  Access discontinued per protocol.       Discharge Plan:   Patient discharged in stable condition accompanied by: self.  Departure Mode: Ambulatory.      Rita Chinchilla RN

## 2025-02-06 ENCOUNTER — MEDICAL CORRESPONDENCE (OUTPATIENT)
Dept: HEALTH INFORMATION MANAGEMENT | Facility: CLINIC | Age: 35
End: 2025-02-06
Payer: COMMERCIAL

## 2025-02-10 ENCOUNTER — INFUSION THERAPY VISIT (OUTPATIENT)
Dept: INFUSION THERAPY | Facility: HOSPITAL | Age: 35
End: 2025-02-10
Payer: COMMERCIAL

## 2025-02-10 VITALS
OXYGEN SATURATION: 98 % | RESPIRATION RATE: 16 BRPM | SYSTOLIC BLOOD PRESSURE: 95 MMHG | TEMPERATURE: 97.7 F | DIASTOLIC BLOOD PRESSURE: 52 MMHG | HEART RATE: 72 BPM

## 2025-02-10 DIAGNOSIS — R20.3 HYPERESTHESIA: Primary | ICD-10-CM

## 2025-02-10 PROCEDURE — 250N000011 HC RX IP 250 OP 636: Performed by: PSYCHIATRY & NEUROLOGY

## 2025-02-10 PROCEDURE — 258N000003 HC RX IP 258 OP 636: Performed by: PSYCHIATRY & NEUROLOGY

## 2025-02-10 PROCEDURE — 96365 THER/PROPH/DIAG IV INF INIT: CPT

## 2025-02-10 RX ORDER — HEPARIN SODIUM (PORCINE) LOCK FLUSH IV SOLN 100 UNIT/ML 100 UNIT/ML
5 SOLUTION INTRAVENOUS
OUTPATIENT
Start: 2025-02-10

## 2025-02-10 RX ORDER — HEPARIN SODIUM,PORCINE 10 UNIT/ML
5-20 VIAL (ML) INTRAVENOUS DAILY PRN
OUTPATIENT
Start: 2025-02-10

## 2025-02-10 RX ADMIN — SODIUM CHLORIDE 400 MG: 9 INJECTION, SOLUTION INTRAVENOUS at 10:58

## 2025-02-11 NOTE — TELEPHONE ENCOUNTER
Records Requested   February 11, 2025 12:35 PM   [facility]    Outcome Request for image    02/12/2025 2:39 PM: HP image resolved in PACS -MX     FUTURE VISIT INFORMATION      FUTURE VISIT INFORMATION:  Date: 5/5/25  Time: 9:30AM  Location: [facility]  REFERRAL INFORMATION:  Referring provider:  Melani Norman  Referring providers clinic:  [facility]   Reason for visit/diagnosis  New per pt-ref, Nasal septal deviation, Chronic sinusitis, unspecified, ref'd by Melani Norman, ref-recs in [facility], there is a CT Scan being sent from from [facility] ENT, confirmed CSC     RECORDS REQUESTED FROM:       Clinic name Comments Records Status Imaging Status   [facility] 2/5/25, 1/14/25 - E-visit / OV w. Melani Norman  1/6/25- Ov w. Victor Hugo Mcgarry, PAJuanitaC    9/6/24- Ov w. Cassandra Haney, APRN, CNP    11/30/21- Ov w. Jose Fuentes, PA-C      More in CE CE    [facility]  9/8/21- MR Brain   12/20/18- CT Sinus  CE 2/11  Pending  PACS

## 2025-02-12 NOTE — PATIENT INSTRUCTIONS
Plan:  We will continue the every other week steroid infusions for now.  I will send at least some information about additional immunotherapy is that I do have some concerns about potential side effects with long-term use of these as well.  She will do some reading and consider options.  Follow-up in 6 months.

## 2025-02-26 ENCOUNTER — MYC MEDICAL ADVICE (OUTPATIENT)
Dept: NEUROLOGY | Facility: CLINIC | Age: 35
End: 2025-02-26

## 2025-02-26 ENCOUNTER — INFUSION THERAPY VISIT (OUTPATIENT)
Dept: INFUSION THERAPY | Facility: HOSPITAL | Age: 35
End: 2025-02-26
Payer: COMMERCIAL

## 2025-02-26 DIAGNOSIS — R20.3 HYPERESTHESIA: Primary | ICD-10-CM

## 2025-02-26 PROCEDURE — 258N000003 HC RX IP 258 OP 636: Performed by: PSYCHIATRY & NEUROLOGY

## 2025-02-26 PROCEDURE — 96365 THER/PROPH/DIAG IV INF INIT: CPT

## 2025-02-26 PROCEDURE — 250N000011 HC RX IP 250 OP 636: Performed by: PSYCHIATRY & NEUROLOGY

## 2025-02-26 RX ORDER — HEPARIN SODIUM (PORCINE) LOCK FLUSH IV SOLN 100 UNIT/ML 100 UNIT/ML
5 SOLUTION INTRAVENOUS
OUTPATIENT
Start: 2025-02-26

## 2025-02-26 RX ORDER — HEPARIN SODIUM,PORCINE 10 UNIT/ML
5-20 VIAL (ML) INTRAVENOUS DAILY PRN
OUTPATIENT
Start: 2025-02-26

## 2025-02-26 RX ADMIN — SODIUM CHLORIDE 400 MG: 9 INJECTION, SOLUTION INTRAVENOUS at 09:41

## 2025-02-26 NOTE — PROGRESS NOTES
Infusion Nursing Note:  Rose Ronquillo presents today for iv solumedrol.    Patient seen by provider today: No   present during visit today: Not Applicable.    Note: PT here ambulatory for iv solumedrol. Reviewed infusion. IV started and solumedrol infused with ns flush post infusion. PT tolerated without any problems. Iv dc'd and pressure dressing to site. PT dc'd steady gait.      Intravenous Access:  Peripheral IV placed.    Treatment Conditions:  Not Applicable.      Post Infusion Assessment:  Patient tolerated infusion without incident.       Discharge Plan:   Follow up reviewed.      Shannon Benito RN

## 2025-03-10 ENCOUNTER — INFUSION THERAPY VISIT (OUTPATIENT)
Dept: INFUSION THERAPY | Facility: HOSPITAL | Age: 35
End: 2025-03-10
Payer: COMMERCIAL

## 2025-03-10 VITALS
HEART RATE: 67 BPM | OXYGEN SATURATION: 99 % | SYSTOLIC BLOOD PRESSURE: 102 MMHG | RESPIRATION RATE: 14 BRPM | DIASTOLIC BLOOD PRESSURE: 62 MMHG | TEMPERATURE: 97.7 F

## 2025-03-10 DIAGNOSIS — R20.3 HYPERESTHESIA: Primary | ICD-10-CM

## 2025-03-10 PROCEDURE — 96365 THER/PROPH/DIAG IV INF INIT: CPT

## 2025-03-10 PROCEDURE — 258N000003 HC RX IP 258 OP 636: Performed by: PSYCHIATRY & NEUROLOGY

## 2025-03-10 PROCEDURE — 250N000011 HC RX IP 250 OP 636: Performed by: PSYCHIATRY & NEUROLOGY

## 2025-03-10 RX ORDER — HEPARIN SODIUM (PORCINE) LOCK FLUSH IV SOLN 100 UNIT/ML 100 UNIT/ML
5 SOLUTION INTRAVENOUS
OUTPATIENT
Start: 2025-03-10

## 2025-03-10 RX ORDER — HEPARIN SODIUM,PORCINE 10 UNIT/ML
5-20 VIAL (ML) INTRAVENOUS DAILY PRN
OUTPATIENT
Start: 2025-03-10

## 2025-03-10 RX ADMIN — SODIUM CHLORIDE 400 MG: 9 INJECTION, SOLUTION INTRAVENOUS at 11:06

## 2025-03-10 NOTE — PROGRESS NOTES
Infusion Nursing Note:  Rose MORLEY Alirezamallika presents today for solumedrol infusion.    Patient seen by provider today: No   present during visit today: Not Applicable.    Note: N/A.      Intravenous Access:  Peripheral IV placed.    Treatment Conditions:  Not Applicable.      Post Infusion Assessment:  Patient tolerated infusion without incident.  Site patent and intact, free from redness, edema or discomfort.  No evidence of extravasations.  Access discontinued per protocol.       Discharge Plan:   Discharge instructions reviewed with: Patient.  Patient and/or family verbalized understanding of discharge instructions and all questions answered.  Patient discharged in stable condition accompanied by: self.  Departure Mode: Ambulatory.      Diane Meehan RN

## 2025-03-24 ENCOUNTER — INFUSION THERAPY VISIT (OUTPATIENT)
Dept: INFUSION THERAPY | Facility: HOSPITAL | Age: 35
End: 2025-03-24
Payer: COMMERCIAL

## 2025-03-24 VITALS
DIASTOLIC BLOOD PRESSURE: 61 MMHG | SYSTOLIC BLOOD PRESSURE: 97 MMHG | TEMPERATURE: 98 F | OXYGEN SATURATION: 99 % | HEART RATE: 66 BPM | RESPIRATION RATE: 16 BRPM

## 2025-03-24 DIAGNOSIS — R20.3 HYPERESTHESIA: Primary | ICD-10-CM

## 2025-03-24 PROCEDURE — 258N000003 HC RX IP 258 OP 636: Performed by: PSYCHIATRY & NEUROLOGY

## 2025-03-24 PROCEDURE — 96365 THER/PROPH/DIAG IV INF INIT: CPT

## 2025-03-24 PROCEDURE — 250N000011 HC RX IP 250 OP 636: Mod: JZ | Performed by: PSYCHIATRY & NEUROLOGY

## 2025-03-24 RX ORDER — HEPARIN SODIUM,PORCINE 10 UNIT/ML
5-20 VIAL (ML) INTRAVENOUS DAILY PRN
OUTPATIENT
Start: 2025-03-24

## 2025-03-24 RX ORDER — HEPARIN SODIUM (PORCINE) LOCK FLUSH IV SOLN 100 UNIT/ML 100 UNIT/ML
5 SOLUTION INTRAVENOUS
OUTPATIENT
Start: 2025-03-24

## 2025-03-24 RX ADMIN — SODIUM CHLORIDE 400 MG: 9 INJECTION, SOLUTION INTRAVENOUS at 11:58

## 2025-03-24 ASSESSMENT — PAIN SCALES - GENERAL: PAINLEVEL_OUTOF10: NO PAIN (0)

## 2025-03-24 NOTE — PROGRESS NOTES
Infusion Nursing Note:  Rose Ronquillo presents today for Infusion Methylprednisolone 400 mg.    Patient seen by provider today: No   present during visit today: Not Applicable.    Note: Rose arrived into the infusion center ambulatory in a stable condition for high dose Methylprednisolone, VSS. Plan of care reviewed with her. She verbalized understanding of her plan of care and return to clinic. She stated she had migraine after her last x 2 infusion and requested to add NS hopefully she will not experience the migraine this time.     Intravenous Access:  Peripheral IV placed.    Treatment Conditions:  Not Applicable.    Post Infusion Assessment:  Patient tolerated infusion without incident.  Site patent and intact, free from redness, edema or discomfort.  No evidence of extravasations.  Access discontinued per protocol.     Discharge Plan:   Discharge instructions reviewed with: Patient.  Patient and/or family verbalized understanding of discharge instructions and all questions answered.  Patient discharged in stable condition accompanied by: self.  Departure Mode: Ambulatory.    Gabriella Dang RN

## 2025-04-07 ENCOUNTER — INFUSION THERAPY VISIT (OUTPATIENT)
Dept: INFUSION THERAPY | Facility: HOSPITAL | Age: 35
End: 2025-04-07
Attending: PSYCHIATRY & NEUROLOGY
Payer: COMMERCIAL

## 2025-04-07 VITALS
TEMPERATURE: 98.4 F | RESPIRATION RATE: 16 BRPM | SYSTOLIC BLOOD PRESSURE: 104 MMHG | HEART RATE: 71 BPM | DIASTOLIC BLOOD PRESSURE: 58 MMHG | OXYGEN SATURATION: 97 %

## 2025-04-07 DIAGNOSIS — R20.3 HYPERESTHESIA: Primary | ICD-10-CM

## 2025-04-07 PROCEDURE — 258N000003 HC RX IP 258 OP 636: Performed by: PSYCHIATRY & NEUROLOGY

## 2025-04-07 PROCEDURE — 96365 THER/PROPH/DIAG IV INF INIT: CPT

## 2025-04-07 PROCEDURE — 250N000011 HC RX IP 250 OP 636: Mod: JZ | Performed by: PSYCHIATRY & NEUROLOGY

## 2025-04-07 RX ORDER — HEPARIN SODIUM,PORCINE 10 UNIT/ML
5-20 VIAL (ML) INTRAVENOUS DAILY PRN
OUTPATIENT
Start: 2025-04-07

## 2025-04-07 RX ORDER — HEPARIN SODIUM (PORCINE) LOCK FLUSH IV SOLN 100 UNIT/ML 100 UNIT/ML
5 SOLUTION INTRAVENOUS
OUTPATIENT
Start: 2025-04-07

## 2025-04-07 RX ADMIN — SODIUM CHLORIDE 400 MG: 9 INJECTION, SOLUTION INTRAVENOUS at 12:22

## 2025-04-07 NOTE — PROGRESS NOTES
Infusion Nursing Note:  Rose Ronquillo presents today for Infusion Methylprednisolone 400 mg.    Patient seen by provider today: No   present during visit today: Not Applicable.    Note: Rose arrived into the infusion center ambulatory in a stable condition for high dose Methylprednisolone, VSS. Plan of care reviewed with her. She verbalized understanding of her plan of care and return to clinic. She stated she did not have migraine after the last infusion.   Intravenous Access:  Peripheral IV placed.    Treatment Conditions:  Not Applicable.    Post Infusion Assessment:  Patient tolerated infusion without incident.  Site patent and intact, free from redness, edema or discomfort.  No evidence of extravasations.  Access discontinued per protocol.     Discharge Plan:   Discharge instructions reviewed with: Patient.  Patient and/or family verbalized understanding of discharge instructions and all questions answered.  Patient discharged in stable condition accompanied by: self.  Departure Mode: Ambulatory.    Gabriella Dang RN

## 2025-04-21 ENCOUNTER — INFUSION THERAPY VISIT (OUTPATIENT)
Dept: INFUSION THERAPY | Facility: HOSPITAL | Age: 35
End: 2025-04-21
Attending: PSYCHIATRY & NEUROLOGY
Payer: COMMERCIAL

## 2025-04-21 VITALS
OXYGEN SATURATION: 100 % | RESPIRATION RATE: 16 BRPM | TEMPERATURE: 97.5 F | HEART RATE: 73 BPM | SYSTOLIC BLOOD PRESSURE: 97 MMHG | DIASTOLIC BLOOD PRESSURE: 61 MMHG

## 2025-04-21 DIAGNOSIS — R20.3 HYPERESTHESIA: Primary | ICD-10-CM

## 2025-04-21 PROCEDURE — 250N000011 HC RX IP 250 OP 636: Performed by: PSYCHIATRY & NEUROLOGY

## 2025-04-21 PROCEDURE — 258N000003 HC RX IP 258 OP 636: Performed by: PSYCHIATRY & NEUROLOGY

## 2025-04-21 PROCEDURE — 96365 THER/PROPH/DIAG IV INF INIT: CPT

## 2025-04-21 RX ORDER — HEPARIN SODIUM,PORCINE 10 UNIT/ML
5-20 VIAL (ML) INTRAVENOUS DAILY PRN
OUTPATIENT
Start: 2025-04-21

## 2025-04-21 RX ORDER — HEPARIN SODIUM (PORCINE) LOCK FLUSH IV SOLN 100 UNIT/ML 100 UNIT/ML
5 SOLUTION INTRAVENOUS
OUTPATIENT
Start: 2025-04-21

## 2025-04-21 RX ADMIN — SODIUM CHLORIDE 400 MG: 9 INJECTION, SOLUTION INTRAVENOUS at 11:53

## 2025-04-21 NOTE — PROGRESS NOTES
Infusion Nursing Note:  Rose Ronquillo presents today for Methylprednisolone infusion.    Patient seen by provider today: No   present during visit today: Not Applicable.    Note: Rose arrived ambulatory in stable condition. She is requesting additional fluids with her infusion due to history of migraines post Methylprednisolone infusion.  ml infused prior to and after infusion. Advised patient to contact provider regarding her recent migraines and to request additional fluids be added to her plan. Methylprednisolone infused as ordered and tolerated well. Denies head ache at this time but states it usually starts about an hour after infusion. Rose is scheduled to return for her next dose on 5/5/25.      Intravenous Access:  Peripheral IV placed.    Treatment Conditions:  Not Applicable.      Post Infusion Assessment:  Patient tolerated infusion without incident.  Site patent and intact, free from redness, edema or discomfort.  Access discontinued per protocol.       Discharge Plan:   Patient discharged in stable condition accompanied by: self.  Departure Mode: Ambulatory.      Rita Chinchilla RN

## 2025-04-28 ENCOUNTER — OFFICE VISIT (OUTPATIENT)
Dept: OTOLARYNGOLOGY | Facility: CLINIC | Age: 35
End: 2025-04-28
Payer: COMMERCIAL

## 2025-04-28 VITALS — WEIGHT: 125 LBS | BODY MASS INDEX: 18.94 KG/M2 | HEIGHT: 68 IN

## 2025-04-28 DIAGNOSIS — J30.2 SEASONAL ALLERGIC RHINITIS, UNSPECIFIED TRIGGER: Primary | ICD-10-CM

## 2025-04-28 DIAGNOSIS — R49.0 MUSCLE TENSION DYSPHONIA: ICD-10-CM

## 2025-04-28 PROCEDURE — 1125F AMNT PAIN NOTED PAIN PRSNT: CPT | Performed by: OTOLARYNGOLOGY

## 2025-04-28 PROCEDURE — 31231 NASAL ENDOSCOPY DX: CPT | Performed by: OTOLARYNGOLOGY

## 2025-04-28 PROCEDURE — 99202 OFFICE O/P NEW SF 15 MIN: CPT | Mod: 25 | Performed by: OTOLARYNGOLOGY

## 2025-04-28 RX ORDER — MUPIROCIN 20 MG/G
OINTMENT TOPICAL
Qty: 15 G | Refills: 2 | Status: SHIPPED | OUTPATIENT
Start: 2025-04-28

## 2025-04-28 RX ORDER — CEFDINIR 300 MG/1
CAPSULE ORAL
COMMUNITY
Start: 2025-02-04

## 2025-04-28 RX ORDER — GENTAMICIN 40 MG/ML
INJECTION, SOLUTION INTRAMUSCULAR; INTRAVENOUS
COMMUNITY
Start: 2025-02-25

## 2025-04-28 RX ORDER — LEVOFLOXACIN 500 MG/1
500 TABLET, FILM COATED ORAL
COMMUNITY
Start: 2025-02-25

## 2025-04-28 RX ORDER — AZELASTINE 1 MG/ML
1 SPRAY, METERED NASAL
COMMUNITY
Start: 2024-09-23

## 2025-04-28 ASSESSMENT — PAIN SCALES - GENERAL: PAINLEVEL_OUTOF10: MILD PAIN (1)

## 2025-04-28 NOTE — LETTER
4/28/2025       RE: Rose Ronquillo  [address] [address]     Dear Colleague,    Thank you for referring your patient, Rose Ronquillo, to the [facility] [place] at [facility]. Please see a copy of my visit note below.      [facility] [place] [id number] [facility]  [address] FLO[address]  [facility] [address]  Phone: [phone]  Fax: [phone]    Patient:  Rose Ronquillo, Date of birth 1990  Date of Visit:  04/28/2025  Referring Provider Melani Norman      Assessment & Plan  Chronic sinus pressure and nasal symptoms:  - Symptoms not due to sinus infection; likely contributions from systemic inflammatory disorder, neurovascular issues (possible facial migraine), and neuromuscular factors.  - Avoid unnecessary antibiotics to prevent resistant bacteria. Consider consultation with a neurologist for potential facial migraine. Use Bactroban ointment for nasal vestibulitis twice daily for a week, then 2-3 times per week as needed.  - ICD-10 Codes: R51.9, J34.89, M35.9    Nasal vestibulitis:  - Nasal vestibulitis due to drying and irritation of the nasal vestibule.  - Use Bactroban ointment as prescribed. Refill provided for ongoing management.  - ICD-10 Code: J34.89    Functional voice disorder:  - Possible functional voice disorder due to compensatory mechanisms.  - Referral to voice therapist for evaluation and management.  - ICD-10 Code: R49.0     Based on our discussion, I have outlined the following instructions for you:      - Try not to use antibiotics unless absolutely necessary to avoid bacteria becoming resistant.  - Think about seeing a neurologist to check if you might have a facial migraine.  - Apply Bactroban ointment inside your nose twice a day for one week. After that, use it 2-3 times a week if you need it.  - Keep using Bactroban ointment as directed. You have a  refill available for continued use.  - You have been referred to a voice therapist who will help evaluate and manage your voice issues.    Thank you again for your visit, and we look forward to supporting you in your journey to better health.       25 minutes spent by me on the date of the encounter doing chart review, history and exam, documentation and further activities per the note. This time is in addition to separately billable procedure.           Mariaa Gonzáles MD            Otolaryngology Adult Consultation    HPI: Rose Ronquillo is a 35 year old female seen today in the Otolaryngology Clinic in consultation from Melani Norman for a history of recurring sinus infections.      History of Present Illness-Rose Ronquillo, a 35-year-old female, has been experiencing recurrent sinus infections, with four or more episodes annually for several years. She reported a sinus infection in November and has been on various medications, including prednisone, which has not been effective in alleviating her symptoms. For the past six months, she has experienced persistent pressure in her sinuses and changes in her voice. She struggles to breathe through her nose, with increased heaviness during infections. She frequently experiences post-nasal drip, leading to difficulty speaking and working. When blowing her nose, she often expels blood clots rather than mucus. A CT scan of her sinuses in February, during an infection, showed clear imaging with no significant obstructions. She regularly uses nasal rinses and sprays and has an air purifier in her apartment. Rose also mentioned a systemic inflammatory disorder that affects her muscle control, requiring prednisone to manage symptoms. She experiences migraines.     Current Outpatient Medications   Medication Sig Dispense Refill     azelastine (ASTELIN) 0.1 % nasal spray Spray 1 spray in nostril.       amLODIPine (NORVASC) 2.5 MG tablet TAKE 1 TABLET(2.5 MG) BY MOUTH DAILY  (Patient not taking: Reported on 9/11/2023) 90 tablet 3     Calcium Carbonate Antacid (TUMS PO)        cefdinir (OMNICEF) 300 MG capsule  (Patient not taking: Reported on 4/28/2025)       Cholecalciferol (VITAMIN D PO)        doxycycline monohydrate (MONODOX) 100 MG capsule Take 100 mg by mouth 2 times daily. End date 9/12/24       fish oil-omega-3 fatty acids (OMEGA-3 FISH OIL) 1000 MG capsule  (Patient not taking: Reported on 2/5/2024)       gabapentin (NEURONTIN) 100 MG capsule Take 100 mg by mouth Takes 400mg in the morning       gabapentin (NEURONTIN) 300 MG capsule Take 300 mg by mouth       gentamicin (GARAMYCIN) 40 MG/ML injection  (Patient not taking: Reported on 4/28/2025)       ibuprofen (ADVIL,MOTRIN) 200 MG tablet Take 200 mg by mouth every 4 hours as needed       Ipratropium-Albuterol (COMBIVENT RESPIMAT)  MCG/ACT inhaler Inhale 1 puff into the lungs 4 times daily Not to exceed 6 doses per day. 1 Inhaler 0     levofloxacin (LEVAQUIN) 500 MG tablet Take 500 mg by mouth. (Patient not taking: Reported on 4/28/2025)       modafinil (PROVIGIL) 100 MG tablet Take 1 tablet (100 mg) by mouth daily (Patient not taking: Reported on 10/9/2023) 30 tablet 5     Multiple Vitamin (MULTI-VITAMIN PO)  (Patient not taking: Reported on 8/28/2023)       nitroGLYcerin (NITRO-BID) 2 % OINT ointment Place 1 inch (15 mg) onto the skin every 24 hours Apply to affected area ( toes or fingers)  at night and avoid touching eyes or face . (Patient not taking: Reported on 8/28/2023) 30 g 5     sertraline (ZOLOFT) 25 MG tablet Take 1 tablet by mouth daily at 2 pm       UNABLE TO FIND 500 mg every 14 days MEDICATION NAME: Prednisone 250 mg infusion every other week.       VYVANSE 50 MG OR CAPS 1 CAPSULE DAILY       ZOLOFT 50 MG OR TABS I tablet daily 30 0     No current facility-administered medications for this visit.          Allergies   Allergen Reactions     Clavulanic Acid Nausea and Vomiting     Compazine Other (See  Comments)     Erythromycin      EES-INTOL     Hydrocodone-Acetaminophen Other (See Comments)     Pt did not react well to medication post operative septoplasty. She states had nausea and vomiting. Request this to be noted on medication allergy list.      Pt did not react well to medication post operative septoplasty. She states had nausea and vomiting. Request this to be noted on medication allergy list.     Lactose      Other Reaction(s): Gastrointestinal, GI Upset     Penicillins Other (See Comments)     PCN Stomach intol       Past Medical History:   Diagnosis Date     Kyphosis      Raynaud's syndrome      Uncomplicated asthma      Unspecified otitis media        Social History     Occupational History     Not on file   Tobacco Use     Smoking status: Never     Smokeless tobacco: Never   Substance and Sexual Activity     Alcohol use: Yes     Comment: occasional     Drug use: No     Sexual activity: Never     Comment: 1/12/15        Review of Systems      4/28/2025    11:15 AM    ENT ROS   Constitutional Unexplained fatigue   Neurology Dizzy spells    Headache   Ears, Nose, Throat Ear pain    Ringing/noise in ears    Nasal congestion or drainage    Sore throat    Hoarseness   Cardiopulmonary Breathing problems   Musculoskeletal Back pain    Neck pain   Allergy/Immunology Allergies or hay fever    Skin changes    Rash   Hematologic Easy bruising   Endocrine Heat or cold intolerance   Other Rash        14 point ROS neg other than the symptoms noted above.    Physical Exam:    Physical Exam- HEENT: Nasal vestibulitis observed. Right nasal valve stenosis noted. Septal perforation present.     Procedure:  Endoscopy indicated for exam of sinuses  Topical anesthetic/decongestant spray applied.  Rigid scope used for visualization. I inspected the interior of the nasal cavity and the middle and superior meatus, the turbinates, and the sphenoethmoid recess.  Findings: bilateral anterior nasal inflammation and edema c/w  vestibulitis. Scope exam of posterior nose is normal with regards to middle, posterior meati. Sinus drainage pathways: Clean and dry, with normal appearance. Nasal septum: Thin with perforation noted.      - CT scan of sinuses in February 2025: Normal sinuses, no significant inflammation or obstruction noted.                   Again, thank you for allowing me to participate in the care of your patient.      Sincerely,    Mariaa Gonzáles MD

## 2025-04-28 NOTE — PROGRESS NOTES
[facility] [address] [address]  Phone: [phone]  Fax: [phone]    Patient:  Rose Ronquillo, Date of birth 1990  Date of Visit:  04/28/2025  Referring Provider Melani Norman      Assessment & Plan  Chronic sinus pressure and nasal symptoms:  - Symptoms not due to sinus infection; likely contributions from systemic inflammatory disorder, neurovascular issues (possible facial migraine), and neuromuscular factors.  - Avoid unnecessary antibiotics to prevent resistant bacteria. Consider consultation with a neurologist for potential facial migraine. Use Bactroban ointment for nasal vestibulitis twice daily for a week, then 2-3 times per week as needed.  - ICD-10 Codes: R51.9, J34.89, M35.9    Nasal vestibulitis:  - Nasal vestibulitis due to drying and irritation of the nasal vestibule.  - Use Bactroban ointment as prescribed. Refill provided for ongoing management.  - ICD-10 Code: J34.89    Functional voice disorder:  - Possible functional voice disorder due to compensatory mechanisms.  - Referral to voice therapist for evaluation and management.  - ICD-10 Code: R49.0     Based on our discussion, I have outlined the following instructions for you:      - Try not to use antibiotics unless absolutely necessary to avoid bacteria becoming resistant.  - Think about seeing a neurologist to check if you might have a facial migraine.  - Apply Bactroban ointment inside your nose twice a day for one week. After that, use it 2-3 times a week if you need it.  - Keep using Bactroban ointment as directed. You have a refill available for continued use.  - You have been referred to a voice therapist who will help evaluate and manage your voice issues.    Thank you again for your visit, and we look forward to supporting you in your journey to better health.       25 minutes spent by me on the date of the encounter doing chart review, history  and exam, documentation and further activities per the note. This time is in addition to separately billable procedure.           Mariaa Gonzáles MD            Otolaryngology Adult Consultation    HPI: Rose Ronquillo is a 35 year old female seen today in the Otolaryngology Clinic in consultation from Melani Norman for a history of recurring sinus infections.      History of Present Illness-Rose Ronquillo, a 35-year-old female, has been experiencing recurrent sinus infections, with four or more episodes annually for several years. She reported a sinus infection in November and has been on various medications, including prednisone, which has not been effective in alleviating her symptoms. For the past six months, she has experienced persistent pressure in her sinuses and changes in her voice. She struggles to breathe through her nose, with increased heaviness during infections. She frequently experiences post-nasal drip, leading to difficulty speaking and working. When blowing her nose, she often expels blood clots rather than mucus. A CT scan of her sinuses in February, during an infection, showed clear imaging with no significant obstructions. She regularly uses nasal rinses and sprays and has an air purifier in her apartment. Rose also mentioned a systemic inflammatory disorder that affects her muscle control, requiring prednisone to manage symptoms. She experiences migraines.     Current Outpatient Medications   Medication Sig Dispense Refill    azelastine (ASTELIN) 0.1 % nasal spray Spray 1 spray in nostril.      amLODIPine (NORVASC) 2.5 MG tablet TAKE 1 TABLET(2.5 MG) BY MOUTH DAILY (Patient not taking: Reported on 9/11/2023) 90 tablet 3    Calcium Carbonate Antacid (TUMS PO)       cefdinir (OMNICEF) 300 MG capsule  (Patient not taking: Reported on 4/28/2025)      Cholecalciferol (VITAMIN D PO)       doxycycline monohydrate (MONODOX) 100 MG capsule Take 100 mg by mouth 2 times daily. End date 9/12/24      fish  oil-omega-3 fatty acids (OMEGA-3 FISH OIL) 1000 MG capsule  (Patient not taking: Reported on 2/5/2024)      gabapentin (NEURONTIN) 100 MG capsule Take 100 mg by mouth Takes 400mg in the morning      gabapentin (NEURONTIN) 300 MG capsule Take 300 mg by mouth      gentamicin (GARAMYCIN) 40 MG/ML injection  (Patient not taking: Reported on 4/28/2025)      ibuprofen (ADVIL,MOTRIN) 200 MG tablet Take 200 mg by mouth every 4 hours as needed      Ipratropium-Albuterol (COMBIVENT RESPIMAT)  MCG/ACT inhaler Inhale 1 puff into the lungs 4 times daily Not to exceed 6 doses per day. 1 Inhaler 0    levofloxacin (LEVAQUIN) 500 MG tablet Take 500 mg by mouth. (Patient not taking: Reported on 4/28/2025)      modafinil (PROVIGIL) 100 MG tablet Take 1 tablet (100 mg) by mouth daily (Patient not taking: Reported on 10/9/2023) 30 tablet 5    Multiple Vitamin (MULTI-VITAMIN PO)  (Patient not taking: Reported on 8/28/2023)      nitroGLYcerin (NITRO-BID) 2 % OINT ointment Place 1 inch (15 mg) onto the skin every 24 hours Apply to affected area ( toes or fingers)  at night and avoid touching eyes or face . (Patient not taking: Reported on 8/28/2023) 30 g 5    sertraline (ZOLOFT) 25 MG tablet Take 1 tablet by mouth daily at 2 pm      UNABLE TO FIND 500 mg every 14 days MEDICATION NAME: Prednisone 250 mg infusion every other week.      VYVANSE 50 MG OR CAPS 1 CAPSULE DAILY      ZOLOFT 50 MG OR TABS I tablet daily 30 0     No current facility-administered medications for this visit.          Allergies   Allergen Reactions    Clavulanic Acid Nausea and Vomiting    Compazine Other (See Comments)    Erythromycin      EES-INTOL    Hydrocodone-Acetaminophen Other (See Comments)     Pt did not react well to medication post operative septoplasty. She states had nausea and vomiting. Request this to be noted on medication allergy list.      Pt did not react well to medication post operative septoplasty. She states had nausea and vomiting.  Request this to be noted on medication allergy list.    Lactose      Other Reaction(s): Gastrointestinal, GI Upset    Penicillins Other (See Comments)     PCN Stomach intol       Past Medical History:   Diagnosis Date    Kyphosis     Raynaud's syndrome     Uncomplicated asthma     Unspecified otitis media        Social History     Occupational History    Not on file   Tobacco Use    Smoking status: Never    Smokeless tobacco: Never   Substance and Sexual Activity    Alcohol use: Yes     Comment: occasional    Drug use: No    Sexual activity: Never     Comment: 1/12/15        Review of Systems      4/28/2025    11:15 AM   [facility] ROS   Constitutional Unexplained fatigue   Neurology Dizzy spells    Headache   Ears, Nose, Throat Ear pain    Ringing/noise in ears    Nasal congestion or drainage    Sore throat    Hoarseness   Cardiopulmonary Breathing problems   Musculoskeletal Back pain    Neck pain   Allergy/Immunology Allergies or hay fever    Skin changes    Rash   Hematologic Easy bruising   Endocrine Heat or cold intolerance   Other Rash        14 point ROS neg other than the symptoms noted above.    Physical Exam:    Physical Exam- HEENT: Nasal vestibulitis observed. Right nasal valve stenosis noted. Septal perforation present.     Procedure:  Endoscopy indicated for exam of sinuses  Topical anesthetic/decongestant spray applied.  Rigid scope used for visualization. I inspected the interior of the nasal cavity and the middle and superior meatus, the turbinates, and the sphenoethmoid recess.  Findings: bilateral anterior nasal inflammation and edema c/w vestibulitis. Scope exam of posterior nose is normal with regards to middle, posterior meati. Sinus drainage pathways: Clean and dry, with normal appearance. Nasal septum: Thin with perforation noted.      - CT scan of sinuses in February 2025: Normal sinuses, no significant inflammation or obstruction noted.

## 2025-04-29 NOTE — TELEPHONE ENCOUNTER
FUTURE VISIT INFORMATION      FUTURE VISIT INFORMATION:  Date: 8/21/25  Time: 8AM   Location: [facility]  REFERRAL INFORMATION:  Referring provider:  Mariaa Gonzáles MD   Referring providers clinic:  [facility]  Reason for visit/diagnosis  Referral to SLP only for muscle tension dysphonia-Per - Appt Per PT     RECORDS REQUESTED FROM:       Clinic name Comments Records Status Imaging Status   [facility] 4/28/25- Ov Mariaa Cabrera MD  Epic

## 2025-05-05 ENCOUNTER — PRE VISIT (OUTPATIENT)
Dept: OTOLARYNGOLOGY | Facility: CLINIC | Age: 35
End: 2025-05-05

## 2025-05-05 ENCOUNTER — INFUSION THERAPY VISIT (OUTPATIENT)
Dept: INFUSION THERAPY | Facility: HOSPITAL | Age: 35
End: 2025-05-05
Attending: PSYCHIATRY & NEUROLOGY
Payer: COMMERCIAL

## 2025-05-05 VITALS
OXYGEN SATURATION: 98 % | SYSTOLIC BLOOD PRESSURE: 110 MMHG | DIASTOLIC BLOOD PRESSURE: 59 MMHG | RESPIRATION RATE: 16 BRPM | TEMPERATURE: 97.5 F | HEART RATE: 76 BPM

## 2025-05-05 DIAGNOSIS — R20.3 HYPERESTHESIA: Primary | ICD-10-CM

## 2025-05-05 PROCEDURE — 250N000011 HC RX IP 250 OP 636: Performed by: PSYCHIATRY & NEUROLOGY

## 2025-05-05 PROCEDURE — 258N000003 HC RX IP 258 OP 636: Performed by: PSYCHIATRY & NEUROLOGY

## 2025-05-05 PROCEDURE — 96365 THER/PROPH/DIAG IV INF INIT: CPT

## 2025-05-05 RX ORDER — HEPARIN SODIUM (PORCINE) LOCK FLUSH IV SOLN 100 UNIT/ML 100 UNIT/ML
5 SOLUTION INTRAVENOUS
OUTPATIENT
Start: 2025-05-05

## 2025-05-05 RX ORDER — HEPARIN SODIUM,PORCINE 10 UNIT/ML
5-20 VIAL (ML) INTRAVENOUS DAILY PRN
OUTPATIENT
Start: 2025-05-05

## 2025-05-05 RX ADMIN — SODIUM CHLORIDE 400 MG: 9 INJECTION, SOLUTION INTRAVENOUS at 11:45

## 2025-05-05 NOTE — PROGRESS NOTES
Infusion Nursing Note:  Rose Ronquillo presents today for Methylprednisolone infusion.    Patient seen by provider today: No   present during visit today: Not Applicable.    Note: Rose arrived ambulatory in stable condition reporting some lower back pain. She denies having a migraine after her last dose of methylprednisolone and attributes it to the 250 ml saline flush before and after infusion of medication. Tolerated infusion today with NS flush. She is scheduled to return 5/19/25 for her next dose of methylprednisolone on 5/19/25.      Intravenous Access:  Peripheral IV placed.    Treatment Conditions:  Not Applicable.      Post Infusion Assessment:  Patient tolerated infusion without incident.  Site patent and intact, free from redness, edema or discomfort.  Access discontinued per protocol.       Discharge Plan:   Patient discharged in stable condition accompanied by: self.  Departure Mode: Ambulatory.      Rita Chinchilla RN

## 2025-05-12 ENCOUNTER — OFFICE VISIT (OUTPATIENT)
Dept: OTOLARYNGOLOGY | Facility: CLINIC | Age: 35
End: 2025-05-12
Attending: OTOLARYNGOLOGY
Payer: COMMERCIAL

## 2025-05-12 ENCOUNTER — PRE VISIT (OUTPATIENT)
Dept: OTOLARYNGOLOGY | Facility: CLINIC | Age: 35
End: 2025-05-12

## 2025-05-12 DIAGNOSIS — J38.7 LARYNGEAL HYPERFUNCTION: ICD-10-CM

## 2025-05-12 DIAGNOSIS — R49.0 DYSPHONIA: Primary | ICD-10-CM

## 2025-05-12 DIAGNOSIS — Q31.8 LARYNGEAL CLEFT: ICD-10-CM

## 2025-05-12 DIAGNOSIS — J38.7 IRRITABLE LARYNX SYNDROME: ICD-10-CM

## 2025-05-12 NOTE — PROGRESS NOTES
[facility]  Clinical Voice and Upper Airway Evaluation Report    Patient's Name: Rose Ronquillo  Date of Evaluation: 5/12/2025  Providing SLP: Lacey Braun MS CCC-SLP  Referring Provider and Facility: Mariaa Gonzáles MD - Otolaryngology  Insurance Coverage: [employer]  Chief Complaint: Dysphonia  Evaluation Location: [facility]  Time of Evaluation: 1:35 - 3:25 PM      Patient History: Rose is a 35-year-old female who presents today for evaluation of dysphonia.     Dysphonia:   Onset:  2018 with multiple sinus infections per year  Diagnosed with facial migraines by Dr. Gonzáles, and MTD was suspected alongside this  Progression:   More comfortable to breathe/less bloody secretions through her nose with antibiotic cream over the past few weeks, which has improved her overall breathing and voice  Still gradually improving  Concerns  A little fatigued today after busy weekend of voice use  Has some tension/back-focused resonance intermittently  93% of normal today  Throat tightness, particularly with lower pitch phonations  Voice use considerations:     Talks a lot and endorses some vocal fatigue after a long day  Also very talkative with family and friends    Dyspnea:   Onset: improved after cardiac ablation in 2016  Respiratory conditions: suspected childhood asthma    Dysphagia: denies  GERD symptoms: denies    Cough / Throat Clearing: AM productive coughing to clear post-nasal drip and mucus - resolves within a couple of hours    Throat Pain: denies    Medical / Surgical History:   Seasonal allergies:   No antihistamines due to significant drowsiness  Uses Flonase in PM and air purifier  Considering SCIT  Undergoing allergy testing in June  Autoimmune work-up in progress  Methylprednisolone infusions every 2 weeks  Gradually tapering down when possible  Humidity flares up inflammation  2 years  Joint/movement issues  History of cardiac  "issues      Quality of Life Questionnaires: not completed by the patient      Perceptual Analysis (33782): Evaluation of Voice / Speech / Non-Communicative Laryngeal Behaviors    The GRBAS is a perceptual rating of voice change. 0 indicates no impairment, 3 indicates a severe impairment. \"C\" and \"I\" may be used to signify if these feature was observed consistently or intermittently respectively. This is a rating based on clinical judgement of disordered voice quality.  G ( 1-2 - I ) General Dysphonia     R ( 1-2 - I ) Roughness     B ( 0 ) Breathiness     A ( 0-1 - I ) Asthenia     S ( 1-2 - I ) Strain  Additional information: intermittent glottal florez and back-focused resonance at the middle/ends of phrases    *Validity of this measure may be slightly reduced when performed via acoustic signal from virtual visit*    Additional observations:   Cough/ Throat Clear: not observed today    Breathing patterns: appropriate at rest   Overt tension: \" \"   Habitual pitch: WNL compared to age- and gender-matched peers   Pitch range: \" \"   Resonance: intermittently back-focused  Loudness: appropriate       Acoustic and Aerodynamic Analysis (30134):    Voice samples were recorded and analyzed within [employer] software with aerodynamic information taken in [employer] PAS software. 52 modifier will be used for billing purposes, as aerodynamic evaluation could not be completed while our clinic is waiting for more equipment to be delivered    Fundamental frequency Metrics  /a/ mean F0 = 171 Hz (SD = 1.57 Hz)  /i/ mean F0 = 230 Hz (SD = 1.98 Hz)  [place] Passage Mean f0 = 191 Hz (SD 53.60 Hz)  Glide:   Min F0 = 57 Hz  Max F0 = 912 Hz  Range = 855 Hz    Cepstral Measures  CPPS /a/ = 26.18 dB  CPPS /i/ = 24.54 dB  CPPS \"all voiced\" = 19.79 dB  AVQI (v.3.01) = 2.53    Additional Measures  Harmonic to Noise Ratio /a/ = 25.26 dB  Harmonic to Noise Ratio: [place] passage = 12.32 dB  Jitter (local) /a/ = 0.310 %  Shimmer (local) /a/ = 1.313 " "%        Interpretation:  Mildly elevated shimmer, indicative of cycle-to-cycle perturbations in intensity throughout a sustained vowel  Minimally elevated AVQI, which implies a mild overall dysphonia      Laryngoscopy with stroboscopy:    Provider performing exam: Lacey Braun MS CCC-SLP    Informed consent: Informed verbal consent was obtained, which includes potential side-effects, risks, and benefits of the procedure.     Anesthetic: Topical anesthesia with 3% lidocaine and 0.25% phenylephrine was applied the nostrils bilaterally.     Scope type: A pediatric distal chip flexible laryngoscope was passed through the nare with xenon light source(s).    The laryngeal and pharyngeal structures were evaluated for gross appearance, mobility, function, and focal lesions / abnormalities of the associated mucosa.  Velar Function: not assessed today  General Appearance:   Abnormalities in the nasal cavity: increased secretions, small hole in the septum anteriorly, etc.  Mild reduction in amount and extent of tissue in the interarytenoid space extending slightly into the post-cricoid region. This is most obvious during maximal abduction/as she is abducting and does not appear to impact her ability to achieve complete glottic closure (e.g. no posterior laryngeal gaps for air escape during phonation)  Secretions: increased thick and sticky throughout the laryngeal, pharyngeal, and nasal cavities  Subglottis Appearance: visible portion is patent   R Arytenoid Abduction / Adduction: symmetrical and timely ab/adduction with appropriate range of motion   L Arytenoid Abduction / Adduction: \" \"   Mediolateral Compression: WNL   Anteroposterior Compression: mild with phonation and increased alongside pitch  Vocal Fold Elongation: appropriate   Left (L) Vocal Fold Edge and Mucosa: white with smooth and straight appearance   Right (R) Vocal Fold Edge and Mucosa: \" \"   Narrow Band Imaging: demonstrates similar findings as halogen " light   Glottic Closure: complete   Phase Closure: predominantly open phase   Vertical Level of Approximation: true vocal fold appear to adduct at the same height   L Amplitude: WNL   R Amplitude: WNL  L Mucosal Wave: WNL   R Mucosal Wave: WNL   Phase Symmetry: symmetrical   Periodicity: periodic   Inhalation Phonation: similar findings to exhalation phonation   Therapeutic Probes:   Humming resulted in base of tongue retraction, so vocal folds were not visible  Flow/high airflow stimuli resulted in mild reduction in minimal anteroposterior compression    R anterior nostril:      Modal pitch phonation:        Abduction:            Narrow band imaging:        Therapeutic Techniques Attempted (45126 for Individual Speech Therapy):    Resonant Voice Therapy (RVT) involves training voice-disordered individuals to produce voice in an easier, more resonant and forward-focused manner throughout the speech hierarchy. The objective of this approach is to achieve the strongest possible voice with the least effort and impact stress between the vocal folds to minimize the likelihood of injury. These exercises were instructed today from the phoneme to automatic speech level with chant and natural intonation, and Rose performed them with 70% accuracy with maximal clinician cueing and modeling. Adjustments were made to increase airflow slightly and maintain appropriate airflow across the phrase, leading to improved balance of respiration, phonation, and resonance and reduced glottal florez/back-focused resonance.    Additionally, given her increased secretions in the pharynx during laryngoscopy, a salt water gargle was recommended, particularly in the mornings when her secretions are increased.       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 Perceptually,  intermittent glottal florez with back-focused resonance was detected, leading to mild to moderate, intermittent roughness/strain. Acoustic analysis revealed mildly elevated shimmer and AVQI. Laryngoscopy today demonstrated minimal anteroposterior compression with phonation, increased pharyngeal secretions, and a mild lack of tissue in the interarytenoid space extending slightly into the post-cricoid region. Therefore, a short course of voice therapy has been recommended. She did well with RVT exercises emphasizing appropriate airflow across the phrase and will work toward generalization. Rose is in agreement with this plan of care.     RESEARCH: A warm introduction was not provided regarding participation in laryngology research studies.       Goals:    Coordinate phonatory and respiratory subsystems, demonstrating adequate independence for activities of daily communication   Decrease extrinsic laryngeal muscle activity during communication events   Improve voice quality in all activities of daily living using, as measured by a minimum of a 50% point reduction on the Voice Handicap Index-10 or Singing VHI  Demonstrate appropriate vocal hygiene including, but not limited to, adequate hydration, avoiding vocal abuse, integrating behavioral and dietary changes for GERD into their daily life?.   Incorporate behaviors to reduce irritation and promote laryngeal healing and prevent recurrence.?   Implement behavioral strategies to reduce laryngopharyngeal reflux. ?   Independently maintain a forward focus voice placement 90% of the time during conversational speech.  Independently perform the Vocal Function Exercises, rebalancing respiration, phonation, and resonance 90% of the time  Perform High Level Phonation and Pitch Range Navigation Exercises independently 90% of the time  Patient will express satisfaction with their voice quality and function and their ability to participate in social, personal, and work/school  functions without limitation      Billed Procedures:    Laryngoscopy with stroboscopy 89589  Individual speech therapy session 12469  Perceptual voice assessment 21880  Laryngeal function studies 31902 with 52 modifier  Assessment and treatment time: 110 minutes  Chart review, interpretation of testing, documentation preparation, etc: 35 minutes      Thank you for allowing me to participate in this patient's care,    Lacey Braun MS CCC-SLP  Speech-Language Pathologist  [facility]  Department of Otolaryngology - Head and Neck Surgery  [facility]  [email or web address]  Direct: [phone]  [phone]    *This note may have been completed using [id number] dictation software, so errors may exist. Please contact me for clarification if needed*

## 2025-05-12 NOTE — PATIENT INSTRUCTIONS
Saline gargle recipe  8 oz water  1/4 tsp of salt  1/4 tsp of baking soda          Resonant Voice Therapy Exercises    Targets:   Feel vibration in mid face or in mouth (lips, teeth, cheek bones, nose, etc.) - Maximize the vibration   Make it easy - Minimize vocal effort       Spend as much time as you need on each of the following levels to have a consistently clear voice - when it is consistently clear and across variety in that step move on to the next level. You don t necessarily have to spend much time, spend as much or as little time as you need for each level. Except for Step 6 (reading aloud), spend 2-5 minutes reading out loud maintaining the clear voice.         Do all exercises twice daily, spending 5-10 minutes total on average across the exercises.  Sound level humming ( m  sound) or holding  n,   ng,   z,   v,   oh  or  oo  with lip buzz, etc, at your normal speaking pitch and normal speaking loudness.  Syllable repetition level -  long long long   momomomo   moomoomoo   mamamama , etc. or  n  + vowels, etc.  Word repetition level - money money money, Monday Monday Monday, many, many, many, many (see list of /m/ words/phrases and pick a few words)  Phrase level - Many men on the moon. Mail my money. One Monday morning, etc. (see list of /m/ words/phrases and pick a few phrases). Start with chanting and gradually add more natural intonation.  Automatic speech (over learned/rote) - counting, days of week, months of the year, ABCs, nursery rhymes, lyrics to songs you know well, etc. Start with chanting if needed as you did with exercise 4.  Read out loud - any material will do: newspaper, magazine, book, junk mail, etc. Try to spend the majority of the practice time with reading but ensure you re maintaining the improved/strong voice throughout.       Throughout the day:  Notice your voice as you re saying yes/no responses (right, all right, sure, okay, yes, no, etc.) and pull-out/fix the voice if needed  using the same yes/no responses as spring boards to improve the voice. If needed, sip water, yawn, shift how you re standing/sitting, stretch, etc.        Additional /m/-loaded sentences:  Many miles  Mail my money  Ivelisse  Shant.  Mean Marge made me mad.  Aislinn marinates meat.  Maybe Ivelisse measured.  Norm makes much money.  My mom made marmalade  Many munchkins made merry.  Shaikh mongrels may maul mice.  Meager meals made my mom moan.  More mayhem made managers mad.  Move my mom s many magazines.  Major migraines made me miserable.  My meddling may mean major misery.  Madam Arauz mangled magnolias.  Mercy me!  Meet my mother.  Jose Elias made moccasins.  Neel Petit met Max.  My mother makes muffins.  Leila marched many miles.  My mother mailed me merchandise.  Make more yari marmalade.  My mother makes much money.  Monday morning must mean mishaps.  Velasquez memorized measurements.  Mosquitoes may mean malaria.  Mend Tracey s many mismatched mittens.  Mass media mocks many matters.  Modest mannerisms make me marvel.  Molten magma might migrate many miles.  Misconduct may mean many misdemeanors.

## 2025-05-12 NOTE — LETTER
5/12/2025       RE: Rose Ronquillo  [address] [facility] [address]     Dear Colleague,    Thank you for referring your patient, Rose Ronquillo, to the [facility] [place] [facility]. Please see a copy of my visit note below.    [facility]  Clinical Voice and Upper Airway Evaluation Report    Patient's Name: Rose Ronquillo  Date of Evaluation: 5/12/2025  Providing SLP: Lacey Braun MS CCC-SLP  Referring Provider and Facility: Mariaa Gonzáles MD - Otolaryngology  Insurance Coverage: [employer]  Chief Complaint: Dysphonia  Evaluation Location: [facility]  Time of Evaluation: 1:35 - 3:25 PM      Patient History: Rose is a 35-year-old female who presents today for evaluation of dysphonia.     Dysphonia:   Onset:  2018 with multiple sinus infections per year  Diagnosed with facial migraines by Dr. Gonzáles, and MTD was suspected alongside this  Progression:   More comfortable to breathe/less bloody secretions through her nose with antibiotic cream over the past few weeks, which has improved her overall breathing and voice  Still gradually improving  Concerns  A little fatigued today after busy weekend of voice use  Has some tension/back-focused resonance intermittently  93% of normal today  Throat tightness, particularly with lower pitch phonations  Voice use considerations:     Talks a lot and endorses some vocal fatigue after a long day  Also very talkative with family and friends    Dyspnea:   Onset: improved after cardiac ablation in 2016  Respiratory conditions: suspected childhood asthma    Dysphagia: denies  GERD symptoms: denies    Cough / Throat Clearing: AM productive coughing to clear post-nasal drip and mucus - resolves within a couple of hours    Throat Pain: denies    Medical / Surgical History:   Seasonal allergies:   No antihistamines due to significant  "drowsiness  Uses Flonase in PM and air purifier  Considering SCIT  Undergoing allergy testing in June  Autoimmune work-up in progress  Methylprednisolone infusions every 2 weeks  Gradually tapering down when possible  Humidity flares up inflammation  2 years  Joint/movement issues  History of cardiac issues      Quality of Life Questionnaires: not completed by the patient      Perceptual Analysis (60986): Evaluation of Voice / Speech / Non-Communicative Laryngeal Behaviors    The GRBAS is a perceptual rating of voice change. 0 indicates no impairment, 3 indicates a severe impairment. \"C\" and \"I\" may be used to signify if these feature was observed consistently or intermittently respectively. This is a rating based on clinical judgement of disordered voice quality.  G ( 1-2 - I ) General Dysphonia     R ( 1-2 - I ) Roughness     B ( 0 ) Breathiness     A ( 0-1 - I ) Asthenia     S ( 1-2 - I ) Strain  Additional information: intermittent glottal florez and back-focused resonance at the middle/ends of phrases    *Validity of this measure may be slightly reduced when performed via acoustic signal from virtual visit*    Additional observations:   Cough/ Throat Clear: not observed today    Breathing patterns: appropriate at rest   Overt tension: \" \"   Habitual pitch: WNL compared to age- and gender-matched peers   Pitch range: \" \"   Resonance: intermittently back-focused  Loudness: appropriate       Acoustic and Aerodynamic Analysis (45093):    Voice samples were recorded and analyzed within [employer] software with aerodynamic information taken in [employer] PAS software. 52 modifier will be used for billing purposes, as aerodynamic evaluation could not be completed while our clinic is waiting for more equipment to be delivered    Fundamental frequency Metrics  /a/ mean F0 = 171 Hz (SD = 1.57 Hz)  /i/ mean F0 = 230 Hz (SD = 1.98 Hz)  [place] Mean f0 = 191 Hz (SD 53.60 Hz)  Glide:   Min F0 = 57 Hz  Max F0 = 912 Hz  Range = " "855 Hz    Cepstral Measures  CPPS /a/ = 26.18 dB  CPPS /i/ = 24.54 dB  CPPS \"all voiced\" = 19.79 dB  AVQI (v.3.01) = 2.53    Additional Measures  Harmonic to Noise Ratio /a/ = 25.26 dB  Harmonic to Noise Ratio: [place] passage = 12.32 dB  Jitter (local) /a/ = 0.310 %  Shimmer (local) /a/ = 1.313 %        Interpretation:  Mildly elevated shimmer, indicative of cycle-to-cycle perturbations in intensity throughout a sustained vowel  Minimally elevated AVQI, which implies a mild overall dysphonia      Laryngoscopy with stroboscopy:    Provider performing exam: Lacey Braun MS CCC-SLP    Informed consent: Informed verbal consent was obtained, which includes potential side-effects, risks, and benefits of the procedure.     Anesthetic: Topical anesthesia with 3% lidocaine and 0.25% phenylephrine was applied the nostrils bilaterally.     Scope type: A pediatric distal chip flexible laryngoscope was passed through the nare with xenon light source(s).    The laryngeal and pharyngeal structures were evaluated for gross appearance, mobility, function, and focal lesions / abnormalities of the associated mucosa.  Velar Function: not assessed today  General Appearance:   Abnormalities in the nasal cavity: increased secretions, small hole in the septum anteriorly, etc.  Mild reduction in amount and extent of tissue in the interarytenoid space extending slightly into the post-cricoid region. This is most obvious during maximal abduction/as she is abducting and does not appear to impact her ability to achieve complete glottic closure (e.g. no posterior laryngeal gaps for air escape during phonation)  Secretions: increased thick and sticky throughout the laryngeal, pharyngeal, and nasal cavities  Subglottis Appearance: visible portion is patent   R Arytenoid Abduction / Adduction: symmetrical and timely ab/adduction with appropriate range of motion   L Arytenoid Abduction / Adduction: \" \"   Mediolateral Compression: WNL " "  Anteroposterior Compression: mild with phonation and increased alongside pitch  Vocal Fold Elongation: appropriate   Left (L) Vocal Fold Edge and Mucosa: white with smooth and straight appearance   Right (R) Vocal Fold Edge and Mucosa: \" \"   Narrow Band Imaging: demonstrates similar findings as halogen light   Glottic Closure: complete   Phase Closure: predominantly open phase   Vertical Level of Approximation: true vocal fold appear to adduct at the same height   L Amplitude: WNL   R Amplitude: WNL  L Mucosal Wave: WNL   R Mucosal Wave: WNL   Phase Symmetry: symmetrical   Periodicity: periodic   Inhalation Phonation: similar findings to exhalation phonation   Therapeutic Probes:   Humming resulted in base of tongue retraction, so vocal folds were not visible  Flow/high airflow stimuli resulted in mild reduction in minimal anteroposterior compression    R anterior nostril:      Modal pitch phonation:        Abduction:            Narrow band imaging:        Therapeutic Techniques Attempted (73013 for Individual Speech Therapy):    Resonant Voice Therapy (RVT) involves training voice-disordered individuals to produce voice in an easier, more resonant and forward-focused manner throughout the speech hierarchy. The objective of this approach is to achieve the strongest possible voice with the least effort and impact stress between the vocal folds to minimize the likelihood of injury. These exercises were instructed today from the phoneme to automatic speech level with chant and natural intonation, and Rose performed them with 70% accuracy with maximal clinician cueing and modeling. Adjustments were made to increase airflow slightly and maintain appropriate airflow across the phrase, leading to improved balance of respiration, phonation, and resonance and reduced glottal florez/back-focused resonance.    Additionally, given her increased secretions in the pharynx during laryngoscopy, a salt water gargle was recommended, " particularly in the mornings when her secretions are increased.       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 Perceptually, intermittent glottal florez with back-focused resonance was detected, leading to mild to moderate, intermittent roughness/strain. Acoustic analysis revealed mildly elevated shimmer and AVQI. Laryngoscopy today demonstrated minimal anteroposterior compression with phonation, increased pharyngeal secretions, and a mild lack of tissue in the interarytenoid space extending slightly into the post-cricoid region. Therefore, a short course of voice therapy has been recommended. She did well with RVT exercises emphasizing appropriate airflow across the phrase and will work toward generalization. Rose is in agreement with this plan of care.     RESEARCH: A warm introduction was not provided regarding participation in laryngology research studies.       Goals:    Coordinate phonatory and respiratory subsystems, demonstrating adequate independence for activities of daily communication   Decrease extrinsic laryngeal muscle activity during communication events   Improve voice quality in all activities of daily living using, as measured by a minimum of a 50% point reduction on the Voice Handicap Index-10 or Singing VHI  Demonstrate appropriate vocal hygiene including, but not limited to, adequate hydration, avoiding vocal abuse, integrating behavioral and dietary changes for GERD into their daily life?.   Incorporate behaviors to reduce irritation and promote laryngeal healing and prevent recurrence.?   Implement behavioral strategies to reduce laryngopharyngeal reflux. ?   Independently maintain a forward focus voice placement 90% of the time during conversational speech.  Independently perform the Vocal Function Exercises, rebalancing  respiration, phonation, and resonance 90% of the time  Perform High Level Phonation and Pitch Range Navigation Exercises independently 90% of the time  Patient will express satisfaction with their voice quality and function and their ability to participate in social, personal, and work/school functions without limitation      Billed Procedures:    Laryngoscopy with stroboscopy 73957  Individual speech therapy session 47053  Perceptual voice assessment 21409  Laryngeal function studies 98367 with 52 modifier  Assessment and treatment time: 110 minutes  Chart review, interpretation of testing, documentation preparation, etc: 35 minutes      Thank you for allowing me to participate in this patient's care,    Lacey Braun MS CCC-SLP  Speech-Language Pathologist  [facility]  Department of Otolaryngology - Head and Neck Surgery  [facility]  [email or web address]  Direct: [phone]  [phone]    *This note may have been completed using [id number] dictation software, so errors may exist. Please contact me for clarification if needed*    Again, thank you for allowing me to participate in the care of your patient.      Sincerely,    Lacey Braun, MARK

## 2025-05-12 NOTE — TELEPHONE ENCOUNTER
FUTURE VISIT INFORMATION:  Appointment Date: 5/12/25  Reason for Visit/Diagnosis: Referral to SLP only for muscle tension dysphonia-Per - Appt Per PT      REFERRAL INFORMATION:  Referring Provider:  Mariaa Gonzáles MD   Referring Clinic:  [facility] NEEDED STATUS DETAILS   OFFICE NOTES from Referring Provider Internal 4/28/25 - OV ENT w/Mariaa Gonzáles MD      OFFICE NOTES from Other Specialist  * ENT, Pulmonology, SLP   Internal and CE [facility]:  5/10/16 - EVAL Speech Therapy w/Stephanie Colunga DO    5/17/16, 5/26/16 - Treatment Speech Therapy w/Stephanie Colunga DO    [facility]:  4/8/25 - E-visit ENT Fabio Tao MD      2/25/25 - OV ENT Fabio Tao MD       IMAGING (Reports only)   *Swallow study/esophagram, CT Layrnx, CT Head/Neck, etc Internal 2/26/25 - CT SINUS W/O CONTRAST  12/21/18 - CT SINUS W/O CONTRAST    3/2/06 - CT SCAN HEAD COMBO

## 2025-05-12 NOTE — Clinical Note
Hey guys! Hoping you'd be willing to look at this strobe for a second. 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 I've only seen more severe ones. Doesn't seem to impact anything vocally, but she has some other interesting things going on - possibly autoimmune/genetic/neurological/constellation of something else. She's been getting worked up for years so if there's any additional information a laryngeal cleft diagnosis could provide her in getting her closer to a diagnosis (unlikely but who knows), it's good to know. Thanks!

## 2025-05-19 ENCOUNTER — INFUSION THERAPY VISIT (OUTPATIENT)
Dept: INFUSION THERAPY | Facility: HOSPITAL | Age: 35
End: 2025-05-19
Attending: PSYCHIATRY & NEUROLOGY
Payer: COMMERCIAL

## 2025-05-19 VITALS
RESPIRATION RATE: 16 BRPM | TEMPERATURE: 97.6 F | OXYGEN SATURATION: 98 % | DIASTOLIC BLOOD PRESSURE: 57 MMHG | SYSTOLIC BLOOD PRESSURE: 102 MMHG | HEART RATE: 78 BPM

## 2025-05-19 DIAGNOSIS — R20.3 HYPERESTHESIA: Primary | ICD-10-CM

## 2025-05-19 PROCEDURE — 250N000011 HC RX IP 250 OP 636: Performed by: PSYCHIATRY & NEUROLOGY

## 2025-05-19 PROCEDURE — 96365 THER/PROPH/DIAG IV INF INIT: CPT

## 2025-05-19 PROCEDURE — 258N000003 HC RX IP 258 OP 636: Performed by: PSYCHIATRY & NEUROLOGY

## 2025-05-19 RX ORDER — HEPARIN SODIUM (PORCINE) LOCK FLUSH IV SOLN 100 UNIT/ML 100 UNIT/ML
5 SOLUTION INTRAVENOUS
OUTPATIENT
Start: 2025-05-19

## 2025-05-19 RX ORDER — HEPARIN SODIUM,PORCINE 10 UNIT/ML
5-20 VIAL (ML) INTRAVENOUS DAILY PRN
OUTPATIENT
Start: 2025-05-19

## 2025-05-19 RX ADMIN — SODIUM CHLORIDE 400 MG: 9 INJECTION, SOLUTION INTRAVENOUS at 11:37

## 2025-05-19 ASSESSMENT — PAIN SCALES - GENERAL: PAINLEVEL_OUTOF10: NO PAIN (0)

## 2025-05-20 ENCOUNTER — PATIENT OUTREACH (OUTPATIENT)
Dept: OTOLARYNGOLOGY | Facility: CLINIC | Age: 35
End: 2025-05-20
Payer: COMMERCIAL

## 2025-05-20 NOTE — PROGRESS NOTES
Called patient to let her know that SLP spoke with ENT providers regarding her laryngeal cleft. Per patient she was confused when scheduling called so she appreciated the call and was scheduled with Dr. Vines. Writer encouraged patient to reach out with any other questions or concerns. Patient was agreeable and verbalized understanding of the situation. Bertha Mejia RN on 5/20/2025 at 9:06 AM

## 2025-05-27 ENCOUNTER — VIRTUAL VISIT (OUTPATIENT)
Dept: OTOLARYNGOLOGY | Facility: CLINIC | Age: 35
End: 2025-05-27
Payer: COMMERCIAL

## 2025-05-27 DIAGNOSIS — R49.0 DYSPHONIA: Primary | ICD-10-CM

## 2025-05-27 DIAGNOSIS — J38.7 LARYNGEAL HYPERFUNCTION: ICD-10-CM

## 2025-05-27 DIAGNOSIS — J38.7 IRRITABLE LARYNX SYNDROME: ICD-10-CM

## 2025-05-27 DIAGNOSIS — Q31.8 LARYNGEAL CLEFT: ICD-10-CM

## 2025-05-27 NOTE — PROGRESS NOTES
[facility] VOICE CLINIC  VOICE / UPPER AIRWAY TREATMENT NOTE (CPT 42815)      Patient's name: Rose Ronquillo  Date of Session: 5/27/2025  Providing SLP: Lacey Braun MS CCC-SLP  Referring Provider: Mariaa Gonzáles MD - Otolaryngology  Insurance Coverage: [employer]  Total # of SLP Visits: 2  # of SLP Therapy Sessions: 2  Session Location: Rose was seen via telehealth today.    The patient has been notified and verbally consented to the following statements:   This video visit will be conducted between you and your provider.  Patient has opted to conduct today's video visit vs an in-person appointment, and is not able to attend due to possible exposure to COVID-19.    If during the course of the call the provider feels a video visit is not appropriate, you will not be charged for this service.     Provider has received verbal consent for billable virtual visit from the patient? 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 Perceptually, intermittent glottal florez with back-focused resonance was detected, leading to mild to moderate, intermittent roughness/strain. Acoustic analysis revealed mildly elevated shimmer and AVQI. Laryngoscopy today demonstrated minimal anteroposterior compression with phonation, increased pharyngeal secretions, and a mild lack of tissue in the interarytenoid space extending slightly into the post-cricoid region. Therefore, a short course of voice therapy has been recommended. She did well with RVT exercises emphasizing  "appropriate airflow across the phrase and will work toward generalization. Rose is in agreement with this plan of care.         SUBJECTIVE:  Provided updates after SLP contacted ENT team to inquire about laryngeal cleft  Possibly present e.g. Grade I  Would need to palpate the region to be certain  Would be helpful to perform FEES to ensure that she is not aspirating posteriorly  If asymptomatic, risks of undergoing procedure would outweigh benefits  Still finding benefits from nasal gel from Dr. Gonzáles  Voice exercises helpful  Finding more throat tightness with reading aloud  Can feel glottal florez as throat tightness  Allergies are a big factor right now  Around many dogs this weekend  Pollen high  Has allergy appointment on 6/9      OBJECTIVE/ASSESSMENT:        5/27/2025     8:51 AM   Voice Handicap Index (VHI-10)   My voice makes it difficult for people to hear me 2   People have difficulty understanding me in a noisy room 2   My voice difficulties restrict my personal and social life.  2   I feel left out of conversations because of my voice 1   My voice problem causes me to lose income 2   I feel as though I have to strain to produce voice 2   The clarity of my voice is unpredictable 2   My voice problem upsets me 2   My voice makes me feel handicapped 2   People ask, \"What's wrong with your voice?\" 2   VHI-10 19        Patient-reported       THERAPEUTIC ACTIVITIES:  Resonant Voice Therapy (RVT) involves training voice-disordered individuals to produce voice in an easier, more resonant and forward-focused manner throughout the speech hierarchy. The objective of this approach is to achieve the strongest possible voice with the least effort and impact stress between the vocal folds to minimize the likelihood of injury. These exercises were reviewed today from the phoneme to conversational speech level, and Rose performed them with 70% accuracy with minimal to moderate clinician cueing and modeling. Adjustments " were made to maintain appropriate airflow across the phrase, leading to improved balance of respiration, phonation, and resonance and reduced glottal florez/back-focused resonance. Although performing these techniques at conversational level was difficult due to cognitive demand and allergies today, she demonstrated high accuracy.      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 Adjustments were provided to aid with maintenance of forward-focused resonance more consistently in conversational level    Progress towards goals:  Appropriate given number of sessions       PLAN:  Rose will perform RVT exercises for a total of 10-15 minutes per day between sessions  Rose will adhere to vocal hygiene recommendations, particularly allergy management  Rose will utilize cough suppression strategies as needed between sessions  Written instructions were provided to aid home programming via [employer]  Rose continues to demonstrate adequate progress toward long- and short-term goals.  Continued voice therapy services are recommended. Rose will follow-up for additional sessions on 7/30/25 or sooner. Current goals will continue to be addressed.  Rose is in agreement with this plan of care.      SLP PLAN IN MULTIDISCIPLINARY CLINIC:  Voice SLP presence at next appointment with laryngologist (e.g. Dr. Vines, Dr. Paris, Dr. Amin) is not needed, as she will just be undergoing a FEES to ensure that she is not aspirating given appearance of possible laryngeal cleft. Due to dairy protein allergy and gluten sensitivity, she will be bringing crunch foods from home instead of shortbread cookie and prefers to use water or another thin liquids instead of milk, as she will likely still react to lactose-free milk. Next scheduled MD appointment is  25.      BILLING SUMMARY:  Total treatment time: 58 minutes (including 12 minutes of chart review and preparation, interpretation of testing and therapeutic maneuvers, and document writing)  Speech Pathology Treatment (08582)      Lacey Braun MS CCC-SLP  Speech-Language Pathologist  [facility]  Department of Otolaryngology - Head and Neck Surgery  [facility]  [email or web address]  Direct: [phone]  [phone]    *This note may have been completed using qnsioz-fk-irwy dictation software, so errors may exist. Please contact me for clarification if needed*

## 2025-05-27 NOTE — LETTER
5/27/2025       RE: Rose Ronquillo  [address] [address]     Dear Colleague,    Thank you for referring your patient, Rose Ronquillo, to the [facility] [place] [facility]. Please see a copy of my visit note below.    [facility]  VOICE / UPPER AIRWAY TREATMENT NOTE (CPT 21007)      Patient's name: Rose Ronquillo  Date of Session: 5/27/2025  Providing SLP: Lacey Braun MS CCC-SLP  Referring Provider: Mariaa Gonzáles MD - Otolaryngology  Insurance Coverage: [employer]  Total # of SLP Visits: 2  # of SLP Therapy Sessions: 2  Session Location: Rose was seen via telehealth today.    The patient has been notified and verbally consented to the following statements:   This video visit will be conducted between you and your provider.  Patient has opted to conduct today's video visit vs an in-person appointment, and is not able to attend due to possible exposure to COVID-19.    If during the course of the call the provider feels a video visit is not appropriate, you will not be charged for this service.     Provider has received verbal consent for billable virtual visit from the patient? 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 Perceptually, intermittent glottal florez with back-focused resonance was detected, leading to mild to moderate, intermittent roughness/strain. Acoustic analysis revealed mildly elevated shimmer and AVQI. Laryngoscopy today  "demonstrated minimal anteroposterior compression with phonation, increased pharyngeal secretions, and a mild lack of tissue in the interarytenoid space extending slightly into the post-cricoid region. Therefore, a short course of voice therapy has been recommended. She did well with RVT exercises emphasizing appropriate airflow across the phrase and will work toward generalization. Rose is in agreement with this plan of care.         SUBJECTIVE:  Provided updates after SLP contacted ENT team to inquire about laryngeal cleft  Possibly present e.g. Grade I  Would need to palpate the region to be certain  Would be helpful to perform FEES to ensure that she is not aspirating posteriorly  If asymptomatic, risks of undergoing procedure would outweigh benefits  Still finding benefits from nasal gel from Dr. Gonzáles  Voice exercises helpful  Finding more throat tightness with reading aloud  Can feel glottal florez as throat tightness  Allergies are a big factor right now  Around many dogs this weekend  Pollen high  Has allergy appointment on 6/9      OBJECTIVE/ASSESSMENT:        5/27/2025     8:51 AM   Voice Handicap Index (VHI-10)   My voice makes it difficult for people to hear me 2   People have difficulty understanding me in a noisy room 2   My voice difficulties restrict my personal and social life.  2   I feel left out of conversations because of my voice 1   My voice problem causes me to lose income 2   I feel as though I have to strain to produce voice 2   The clarity of my voice is unpredictable 2   My voice problem upsets me 2   My voice makes me feel handicapped 2   People ask, \"What's wrong with your voice?\" 2   VHI-10 19        Patient-reported       THERAPEUTIC ACTIVITIES:  Resonant Voice Therapy (RVT) involves training voice-disordered individuals to produce voice in an easier, more resonant and forward-focused manner throughout the speech hierarchy. The objective of this approach is to achieve the strongest " possible voice with the least effort and impact stress between the vocal folds to minimize the likelihood of injury. These exercises were reviewed today from the phoneme to conversational speech level, and Rose performed them with 70% accuracy with minimal to moderate clinician cueing and modeling. Adjustments were made to maintain appropriate airflow across the phrase, leading to improved balance of respiration, phonation, and resonance and reduced glottal florez/back-focused resonance. Although performing these techniques at conversational level was difficult due to cognitive demand and allergies today, she demonstrated high accuracy.      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 Adjustments were provided to aid with maintenance of forward-focused resonance more consistently in conversational level    Progress towards goals:  Appropriate given number of sessions       PLAN:  Rose will perform RVT exercises for a total of 10-15 minutes per day between sessions  Rose will adhere to vocal hygiene recommendations, particularly allergy management  Rose will utilize cough suppression strategies as needed between sessions  Written instructions were provided to aid home programming via [facility]  Rose continues to demonstrate adequate progress toward long- and short-term goals.  Continued voice therapy services are recommended. Rose will follow-up for additional sessions on 7/30/25 or sooner. Current goals will continue to be addressed.  Rose is in agreement with this plan of care.      SLP PLAN IN MULTIDISCIPLINARY CLINIC:  Voice SLP presence at next appointment with laryngologist (e.g. Dr. Vines, Dr. Paris, Dr. Amin) is not needed, as she will just be undergoing a FEES to ensure that she is not aspirating given  appearance of possible laryngeal cleft. Due to dairy protein allergy and gluten sensitivity, she will be bringing crunch foods from home instead of shortbread cookie and prefers to use water or another thin liquids instead of milk, as she will likely still react to lactose-free milk. Next scheduled MD appointment is 25.      BILLING SUMMARY:  Total treatment time: 58 minutes (including 12 minutes of chart review and preparation, interpretation of testing and therapeutic maneuvers, and document writing)  Speech Pathology Treatment (47390)      Lacey Braun MS CCC-SLP  Speech-Language Pathologist  [facility]  Department of Otolaryngology - Head and Neck Surgery  [facility]  [email or web address]  Direct: [phone]  [phone]    *This note may have been completed using [id number] dictation software, so errors may exist. Please contact me for clarification if needed*    Again, thank you for allowing me to participate in the care of your patient.      Sincerely,    Lacey Braun, SLP

## 2025-06-02 ENCOUNTER — INFUSION THERAPY VISIT (OUTPATIENT)
Dept: INFUSION THERAPY | Facility: HOSPITAL | Age: 35
End: 2025-06-02
Attending: PSYCHIATRY & NEUROLOGY
Payer: COMMERCIAL

## 2025-06-02 ENCOUNTER — MYC MEDICAL ADVICE (OUTPATIENT)
Dept: NEUROLOGY | Facility: CLINIC | Age: 35
End: 2025-06-02

## 2025-06-02 VITALS
SYSTOLIC BLOOD PRESSURE: 104 MMHG | TEMPERATURE: 97.8 F | RESPIRATION RATE: 16 BRPM | DIASTOLIC BLOOD PRESSURE: 70 MMHG | HEART RATE: 80 BPM | OXYGEN SATURATION: 99 %

## 2025-06-02 DIAGNOSIS — R20.3 HYPERESTHESIA: Primary | ICD-10-CM

## 2025-06-02 PROCEDURE — 250N000011 HC RX IP 250 OP 636: Performed by: PSYCHIATRY & NEUROLOGY

## 2025-06-02 PROCEDURE — 96365 THER/PROPH/DIAG IV INF INIT: CPT

## 2025-06-02 PROCEDURE — 258N000003 HC RX IP 258 OP 636: Performed by: PSYCHIATRY & NEUROLOGY

## 2025-06-02 RX ORDER — HEPARIN SODIUM,PORCINE 10 UNIT/ML
5-20 VIAL (ML) INTRAVENOUS DAILY PRN
OUTPATIENT
Start: 2025-06-02

## 2025-06-02 RX ORDER — HEPARIN SODIUM (PORCINE) LOCK FLUSH IV SOLN 100 UNIT/ML 100 UNIT/ML
5 SOLUTION INTRAVENOUS
OUTPATIENT
Start: 2025-06-02

## 2025-06-02 RX ADMIN — SODIUM CHLORIDE 400 MG: 9 INJECTION, SOLUTION INTRAVENOUS at 11:42

## 2025-06-02 NOTE — PROGRESS NOTES
Infusion Nursing Note:  Rose Ronquillo presents today for Methylprednisolone.    Patient seen by provider today: No   present during visit today: Not Applicable.    Note: Rose arrived ambulatory into the infusion center for Methylprednisolone infusion in a stable condition. She is worried about the humidity because that affects her so much. I went over the plan of care with Rose and she verbalized understanding. PIV placed with great blood return throughout. Methylprednisolone given per order and flush the rest of the way with saline. Rose tolerated with no sign or symptoms of a reaction. IV removed and covered with gauze and coban. Rose left ambulatory to the [facility] and plans on returning on 06/16/2025.    Intravenous Access:  Peripheral IV placed.    Treatment Conditions:  Not Applicable.    Post Infusion Assessment:  Patient tolerated infusion without incident.  Site patent and intact, free from redness, edema or discomfort.  No evidence of extravasations.  Access discontinued per protocol.     Discharge Plan:   Discharge instructions reviewed with: Patient.  Patient and/or family verbalized understanding of discharge instructions and all questions answered.  Patient discharged in stable condition accompanied by: self.  Departure Mode: Ambulatory.    Nannette Dumas RN

## 2025-06-09 ENCOUNTER — TELEPHONE (OUTPATIENT)
Dept: OTOLARYNGOLOGY | Facility: CLINIC | Age: 35
End: 2025-06-09
Payer: COMMERCIAL

## 2025-06-09 NOTE — TELEPHONE ENCOUNTER
Called waitlist patient and offered an appointment with Lacey PETERSON on this date 6/10 & time random     Please note there is no guarantee this appointment will be available as it is first come first serve.

## 2025-06-13 ENCOUNTER — OFFICE VISIT (OUTPATIENT)
Dept: OTOLARYNGOLOGY | Facility: CLINIC | Age: 35
End: 2025-06-13
Payer: COMMERCIAL

## 2025-06-13 DIAGNOSIS — R49.0 DYSPHONIA: Primary | ICD-10-CM

## 2025-06-13 DIAGNOSIS — J38.7 LARYNGEAL HYPERFUNCTION: ICD-10-CM

## 2025-06-13 DIAGNOSIS — Q31.8 LARYNGEAL CLEFT: ICD-10-CM

## 2025-06-13 NOTE — PROGRESS NOTES
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 Yudy Vines.      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 Perceptually, intermittent glottal florez with back-focused resonance was detected, leading to mild to moderate, intermittent roughness/strain. Acoustic analysis revealed mildly elevated shimmer and AVQI. Laryngoscopy today demonstrated minimal anteroposterior compression with phonation, increased pharyngeal secretions, and a mild lack of tissue in the interarytenoid space extending slightly into the post-cricoid region. Therefore, a short course of voice therapy has been recommended. She did well with RVT exercises emphasizing appropriate airflow across the phrase and will work toward generalization. Rose is in agreement with this plan of care.     INTERVAL HISTORY:    6/13/2025 - author: King Cuello -  Given unusual appearance of the posterior glottis raising concern for laryngeal cleft formal evaluation with Dr. Vines was recommended along with fees.  Voice focused speech was consulted secondary to severe dysphonia which presented in the context of an upper respiratory infection.  Patient states this is consistent with how prior sinus infections have begun, ultimately requiring pharmacologic management for resolution.    Given this change voice focused speech evaluation was recommended by Dr. Vines and completed as below.    OBJECTIVE  PATIENT REPORTED MEASURES       No data to display              Patient " "Supplied Answers To Last 2 VHI Questionnaires      5/27/2025     8:51 AM   Voice Handicap Index (VHI-10)   My voice makes it difficult for people to hear me 2   People have difficulty understanding me in a noisy room 2   My voice difficulties restrict my personal and social life.  2   I feel left out of conversations because of my voice 1   My voice problem causes me to lose income 2   I feel as though I have to strain to produce voice 2   The clarity of my voice is unpredictable 2   My voice problem upsets me 2   My voice makes me feel handicapped 2   People ask, \"What's wrong with your voice?\" 2   VHI-10 19        Patient-reported     PERCEPTUAL EVALUATION (CPT 42618)  POSTURE / TENSION:   neck and shoulders    BREATHING:   appears within normal limits and adequate     VOICE:  Roughness: Minimal  Breathiness: Minimal  Strain: Severe to profound Consistent  Loudness  Conversational speech:  Severely reduced  Projected speech:  Profoundly reduced  Pitch:  Conversational speech: Minimal appreciable pitch with the precept of higher than average fundamental frequency  Pitch glide:   Greatly reduced range  Resonance:  Conversational speech:  laryngeal pharyngeal resonance  Singing vs. Speech: Consistent across contexts    COUGH/THROAT CLEARING:  Occasional  Increased in conjunction with voice use    THERAPY PROBES: Improvement was elicited with coordination of respiration and phonation and use of yawn sigh    Laryngeal function studies were not completed today however a benchmark audio recording was gathered.    LARYNGEAL EXAMINATION  Procedure: Flexible endoscopy with chip-tip technology with stroboscopy, right nostril; topical anesthesia with 3% Lidocaine and 0.25% phenylephrine was not applied.   Performed by: Dr. Yudy Vines  The laryngeal and pharyngeal structures were evaluated for gross appearance, mobility, function, and focal lesions / abnormalities of the associated mucosa.  Stroboscopy was warranted to " evaluate closure, symmetry, and vibratory characteristics of the vocal folds.  All findings were within normal limits with the exception of the following salient features:   Mildly thickened secretions without purulence scattered diffusely throughout the supraglottis and pharynx  Vocal folds appear supple a full of the mid membranous juncture with subtle whitish opacity but without any overt signs of infection  With running speech there is profound AP constricture corresponding to audio perceptual strain  With higher flow tasks patient is able to achieve improved glottic configuration and clarity of voicing  Stroboscopy demonstrates:  Slight premature contact of the mid membranous vocal fold  Equivocal reduction of the mucosal wave of the left true vocal fold near the mid membranous juncture  Near normal amplitude bilaterally  Grossly symmetric   Frequent a periodicity    The laryngeal exam was reviewed with Ms. Ronquillo, and I provided pertinent explanations, as well as written and oral information.    ASSESSMENT / PLAN  IMPRESSIONS: Rose Ronquillo presents today with greatly worsened dysphonia (R49.0) in the context of a presumed URI.  Laryngeal evaluation today shows no signs of active infection, with mildly thickened secretions of the nonpurulent nature, but profound patterns of supraglottic recruitment with running speech.  Audio perceptual quality is notable for profound strain to the point of aphonic breaks and minimal appreciable vocal fold vibration/pitch.  Patient was stimulable for significant improvement in voice quality with high flow at this corresponded to improved glottic configuration on laryngeal exam ultimately demonstrating adequate vibratory function.  Overall today's evaluation points towards a system which is potentially hypersensitive with significant patterns of guarding in response to perceived sensation at the level of the larynx.  Fortunately this type of pattern is amenable to behavioral  "intervention to address underlying hypersensitivity and optimize patterns to allow patient to engage in activities of daily living with less restriction.    A swallow evaluation was completed by my colleague Nancy Mendez CCC-SLP along with Dr. Vines.  Please see their notes from today's date for full details of that facet of the evaluation.    RECOMMENDATIONS:   Ongoing medically necessary speech therapy continues to be warranted optimize vocal technique  Continue with current plan of care    Certification period: Not required \"[employer] [place]\"    This treatment plan was developed with the patient who agreed with the recommendations.      TOTAL SERVICE TIME: 60 minutes  EVALUATION OF VOICE AND RESONANCE (96815)  NO CHARGE FACILITY FEE (99776)    Costa Cuello M.M., M.A., CCC-SLP  Speech-Language Pathologist  Certificate of Vocology  [phone]    *this report was created in part through the use of computerized dictation software, and though reviewed following completion, some typographic errors may persist.  If there is confusion regarding any of this notes contents, please contact me for clarification.*    "

## 2025-06-16 ENCOUNTER — INFUSION THERAPY VISIT (OUTPATIENT)
Dept: INFUSION THERAPY | Facility: HOSPITAL | Age: 35
End: 2025-06-16
Attending: PSYCHIATRY & NEUROLOGY
Payer: COMMERCIAL

## 2025-06-16 VITALS
TEMPERATURE: 97.5 F | DIASTOLIC BLOOD PRESSURE: 58 MMHG | SYSTOLIC BLOOD PRESSURE: 95 MMHG | RESPIRATION RATE: 16 BRPM | HEART RATE: 80 BPM | OXYGEN SATURATION: 98 %

## 2025-06-16 DIAGNOSIS — R20.3 HYPERESTHESIA: Primary | ICD-10-CM

## 2025-06-16 PROCEDURE — 250N000011 HC RX IP 250 OP 636: Performed by: PSYCHIATRY & NEUROLOGY

## 2025-06-16 PROCEDURE — 258N000003 HC RX IP 258 OP 636: Performed by: PSYCHIATRY & NEUROLOGY

## 2025-06-16 PROCEDURE — 96365 THER/PROPH/DIAG IV INF INIT: CPT

## 2025-06-16 RX ORDER — HEPARIN SODIUM (PORCINE) LOCK FLUSH IV SOLN 100 UNIT/ML 100 UNIT/ML
5 SOLUTION INTRAVENOUS
Status: DISCONTINUED | OUTPATIENT
Start: 2025-06-16 | End: 2025-06-16 | Stop reason: HOSPADM

## 2025-06-16 RX ORDER — HEPARIN SODIUM,PORCINE 10 UNIT/ML
5-20 VIAL (ML) INTRAVENOUS DAILY PRN
Status: CANCELLED | OUTPATIENT
Start: 2025-06-16

## 2025-06-16 RX ORDER — HEPARIN SODIUM (PORCINE) LOCK FLUSH IV SOLN 100 UNIT/ML 100 UNIT/ML
5 SOLUTION INTRAVENOUS
Status: CANCELLED | OUTPATIENT
Start: 2025-06-16

## 2025-06-16 RX ORDER — HEPARIN SODIUM,PORCINE 10 UNIT/ML
5-20 VIAL (ML) INTRAVENOUS DAILY PRN
Status: DISCONTINUED | OUTPATIENT
Start: 2025-06-16 | End: 2025-06-16 | Stop reason: HOSPADM

## 2025-06-16 RX ADMIN — METHYLPREDNISOLONE SODIUM SUCCINATE 400 MG: 125 INJECTION, POWDER, LYOPHILIZED, FOR SOLUTION INTRAMUSCULAR; INTRAVENOUS at 11:37

## 2025-06-16 RX ADMIN — SODIUM CHLORIDE 250 ML: 0.9 INJECTION, SOLUTION INTRAVENOUS at 11:25

## 2025-06-16 ASSESSMENT — PAIN SCALES - GENERAL: PAINLEVEL_OUTOF10: NO PAIN (0)

## 2025-06-16 NOTE — PROGRESS NOTES
Infusion Nursing Note:  Rose Ronquillo presents today for Infusion Methylprednisolone 400 mg.    Patient seen by provider today: No   present during visit today: Not Applicable.    Note: Rose arrived into the infusion center ambulatory in a stable condition for high dose Methylprednisolone, VSS. Plan of care reviewed with her. She verbalized understanding of her plan of care and return to clinic. She has appointment with her Neurologist next week.  Intravenous Access:  Peripheral IV placed.    Treatment Conditions:  Not Applicable.    Post Infusion Assessment:  Patient tolerated infusion without incident.  Site patent and intact, free from redness, edema or discomfort.  No evidence of extravasations.  Access discontinued per protocol.     Discharge Plan:   Discharge instructions reviewed with: Patient.  Patient and/or family verbalized understanding of discharge instructions and all questions answered.  Patient discharged in stable condition accompanied by: self.  Departure Mode: Ambulatory.    Gabriella Dang RN

## 2025-06-21 ENCOUNTER — HEALTH MAINTENANCE LETTER (OUTPATIENT)
Age: 35
End: 2025-06-21

## 2025-06-30 ENCOUNTER — INFUSION THERAPY VISIT (OUTPATIENT)
Dept: INFUSION THERAPY | Facility: HOSPITAL | Age: 35
End: 2025-06-30
Attending: PSYCHIATRY & NEUROLOGY
Payer: COMMERCIAL

## 2025-06-30 VITALS
OXYGEN SATURATION: 97 % | HEART RATE: 87 BPM | RESPIRATION RATE: 16 BRPM | SYSTOLIC BLOOD PRESSURE: 102 MMHG | DIASTOLIC BLOOD PRESSURE: 64 MMHG | TEMPERATURE: 97.7 F

## 2025-06-30 DIAGNOSIS — R20.3 HYPERESTHESIA: Primary | ICD-10-CM

## 2025-06-30 PROCEDURE — 250N000011 HC RX IP 250 OP 636: Performed by: PSYCHIATRY & NEUROLOGY

## 2025-06-30 PROCEDURE — 96365 THER/PROPH/DIAG IV INF INIT: CPT

## 2025-06-30 PROCEDURE — 258N000003 HC RX IP 258 OP 636: Performed by: PSYCHIATRY & NEUROLOGY

## 2025-06-30 RX ADMIN — METHYLPREDNISOLONE SODIUM SUCCINATE 400 MG: 125 INJECTION, POWDER, LYOPHILIZED, FOR SOLUTION INTRAMUSCULAR; INTRAVENOUS at 11:44

## 2025-06-30 RX ADMIN — SODIUM CHLORIDE 250 ML: 0.9 INJECTION, SOLUTION INTRAVENOUS at 11:44

## 2025-06-30 ASSESSMENT — PAIN SCALES - GENERAL: PAINLEVEL_OUTOF10: MILD PAIN (3)

## 2025-06-30 NOTE — PROGRESS NOTES
Infusion Nursing Note:  Rose Ronquillo presents today for Methylprednisone infusion.    Patient seen by provider today: No   present during visit today: Not Applicable.    Note:  Rose arrived ambulatory into the infusion center for Methylprednisolone infusion in a stable condition, VSS. Plan of care reviewed, she verbalized understanding of her plan of care and return to clinic. Pt will return 7/14.      Intravenous Access:  Peripheral IV placed.    Treatment Conditions:  Not Applicable.      Post Infusion Assessment:  Patient tolerated infusion without incident.  No evidence of extravasations.  Access discontinued per protocol.       Discharge Plan:   Discharge instructions reviewed with: Patient.  Patient and/or family verbalized understanding of discharge instructions and all questions answered.  Patient discharged in stable condition accompanied by: self.  Departure Mode: Ambulatory.      Shannon Dior RN

## 2025-07-14 ENCOUNTER — INFUSION THERAPY VISIT (OUTPATIENT)
Dept: INFUSION THERAPY | Facility: HOSPITAL | Age: 35
End: 2025-07-14
Attending: PSYCHIATRY & NEUROLOGY
Payer: COMMERCIAL

## 2025-07-14 VITALS
TEMPERATURE: 98.9 F | DIASTOLIC BLOOD PRESSURE: 50 MMHG | OXYGEN SATURATION: 97 % | SYSTOLIC BLOOD PRESSURE: 94 MMHG | HEART RATE: 68 BPM

## 2025-07-14 DIAGNOSIS — R20.3 HYPERESTHESIA: Primary | ICD-10-CM

## 2025-07-14 PROCEDURE — 258N000003 HC RX IP 258 OP 636: Performed by: PSYCHIATRY & NEUROLOGY

## 2025-07-14 PROCEDURE — 250N000011 HC RX IP 250 OP 636: Performed by: PSYCHIATRY & NEUROLOGY

## 2025-07-14 PROCEDURE — 96365 THER/PROPH/DIAG IV INF INIT: CPT

## 2025-07-14 RX ADMIN — SODIUM CHLORIDE 250 ML: 0.9 INJECTION, SOLUTION INTRAVENOUS at 11:25

## 2025-07-14 RX ADMIN — SODIUM CHLORIDE 400 MG: 9 INJECTION, SOLUTION INTRAVENOUS at 11:41

## 2025-07-14 ASSESSMENT — PAIN SCALES - GENERAL: PAINLEVEL_OUTOF10: MILD PAIN (2)

## 2025-07-14 NOTE — PROGRESS NOTES
Infusion Nursing Note:  Rose Ronquillo presents today for Infusion Methylprednisolone .    Patient seen by provider today: No   present during visit today: Not Applicable.    Note: Rose arrived into the infusion center ambulatory in a stable condition for high dose Methylprednisolone, VSS. Plan of care reviewed with her. She verbalized understanding of her plan of care and return to clinic.  Intravenous Access:  Peripheral IV placed.    Treatment Conditions:  Not Applicable.    Post Infusion Assessment:  Patient tolerated infusion without incident.  Site patent and intact, free from redness, edema or discomfort.  No evidence of extravasations.  Access discontinued per protocol.     Discharge Plan:   Discharge instructions reviewed with: Patient.  Patient and/or family verbalized understanding of discharge instructions and all questions answered.  Patient discharged in stable condition accompanied by: self.  Departure Mode: Ambulatory.    Gabriella Dang RN

## 2025-07-28 ENCOUNTER — MYC MEDICAL ADVICE (OUTPATIENT)
Dept: NEUROLOGY | Facility: CLINIC | Age: 35
End: 2025-07-28

## 2025-07-28 ENCOUNTER — INFUSION THERAPY VISIT (OUTPATIENT)
Dept: INFUSION THERAPY | Facility: HOSPITAL | Age: 35
End: 2025-07-28
Attending: PSYCHIATRY & NEUROLOGY
Payer: COMMERCIAL

## 2025-07-28 VITALS
TEMPERATURE: 97.9 F | OXYGEN SATURATION: 97 % | HEART RATE: 77 BPM | SYSTOLIC BLOOD PRESSURE: 98 MMHG | DIASTOLIC BLOOD PRESSURE: 54 MMHG | RESPIRATION RATE: 16 BRPM

## 2025-07-28 DIAGNOSIS — R20.3 HYPERESTHESIA: Primary | ICD-10-CM

## 2025-07-28 PROCEDURE — 96361 HYDRATE IV INFUSION ADD-ON: CPT

## 2025-07-28 PROCEDURE — 96374 THER/PROPH/DIAG INJ IV PUSH: CPT

## 2025-07-28 PROCEDURE — 258N000003 HC RX IP 258 OP 636: Performed by: PSYCHIATRY & NEUROLOGY

## 2025-07-28 PROCEDURE — 250N000011 HC RX IP 250 OP 636: Performed by: PSYCHIATRY & NEUROLOGY

## 2025-07-28 RX ADMIN — METHYLPREDNISOLONE SODIUM SUCCINATE 400 MG: 125 INJECTION, POWDER, LYOPHILIZED, FOR SOLUTION INTRAMUSCULAR; INTRAVENOUS at 11:54

## 2025-07-28 RX ADMIN — SODIUM CHLORIDE 250 ML: 0.9 INJECTION, SOLUTION INTRAVENOUS at 11:22

## 2025-07-28 NOTE — PROGRESS NOTES
Infusion Nursing Note:  oRse Ronqiullo presents today for Methylprednisolone.  Patient seen by provider today: No   present during visit today: Not Applicable.    Note: Pt arrived ambulatory, VSS and assessment completed. PIV started with good blood return noted.  ml bolus given followed by methylprednisolone infusion without incident. PIV discontinued per protocol with good blood return noted.       Intravenous Access:  Peripheral IV placed.    Treatment Conditions:  Not Applicable.      Post Infusion Assessment:  Patient tolerated infusion without incident.  Blood return noted pre and post infusion.  Site patent and intact, free from redness, edema or discomfort.  No evidence of extravasations.  Access discontinued per protocol.       Discharge Plan:   Discharge instructions reviewed with: Patient.  Patient and/or family verbalized understanding of discharge instructions and all questions answered.  AVS to patient via Monitoring Division.  Patient will return 8/11/2025 for next appointment.   Patient discharged in stable condition accompanied by: self.  Departure Mode: Ambulatory.      Marva Levi RN

## 2025-07-29 NOTE — TELEPHONE ENCOUNTER
Patient receives IV steroids every 14 days for hyperesthesia. Last dose yesterday 7/28, next dose should be 8/11 but she will be on vacation. Is it ok to delay by a few days and complete 8/15?     Med HERNANDEZ RN, BSN  [facility]

## 2025-07-30 ENCOUNTER — OFFICE VISIT (OUTPATIENT)
Dept: NEUROLOGY | Facility: CLINIC | Age: 35
End: 2025-07-30
Payer: COMMERCIAL

## 2025-07-30 VITALS
WEIGHT: 124.8 LBS | HEART RATE: 75 BPM | BODY MASS INDEX: 18.98 KG/M2 | DIASTOLIC BLOOD PRESSURE: 74 MMHG | SYSTOLIC BLOOD PRESSURE: 108 MMHG

## 2025-07-30 DIAGNOSIS — M35.9 AUTOIMMUNE DISEASE: Primary | ICD-10-CM

## 2025-07-30 DIAGNOSIS — Z79.899 ENCOUNTER FOR LONG-TERM (CURRENT) USE OF MEDICATIONS: ICD-10-CM

## 2025-07-30 DIAGNOSIS — R27.9 INCOORDINATION: ICD-10-CM

## 2025-07-30 DIAGNOSIS — M62.89 PROXIMAL WEAKNESS OF EXTREMITY: ICD-10-CM

## 2025-07-30 DIAGNOSIS — R20.3 HYPERESTHESIA: ICD-10-CM

## 2025-07-30 PROCEDURE — 99214 OFFICE O/P EST MOD 30 MIN: CPT | Performed by: PSYCHIATRY & NEUROLOGY

## 2025-07-30 PROCEDURE — 3078F DIAST BP <80 MM HG: CPT | Performed by: PSYCHIATRY & NEUROLOGY

## 2025-07-30 PROCEDURE — 3074F SYST BP LT 130 MM HG: CPT | Performed by: PSYCHIATRY & NEUROLOGY

## 2025-07-30 NOTE — NURSING NOTE
"Rose Ronquillo is a 35 year old female who presents for:  Chief Complaint   Patient presents with    Follow Up     Patient was seen by ENT. Notes are in Epic.   Still doing infusions. Want to discuss infusion plans and options. Patient sent MyC message 7/28.        Initial Vitals:  /74   Pulse 75   Wt 56.6 kg (124 lb 12.8 oz)   BMI 18.98 kg/m   Estimated body mass index is 18.98 kg/m  as calculated from the following:    Height as of 6/13/25: 1.727 m (5' 8\").    Weight as of this encounter: 56.6 kg (124 lb 12.8 oz).. Body surface area is 1.65 meters squared. BP completed using cuff size: wrist cuff    Jeronimo Field  "

## 2025-07-30 NOTE — LETTER
7/30/2025      Rose Ronquillo  [address] [facility] [address]      Dear Colleague,    Thank you for referring your patient, Rose Ronquillo, to the [facility] [place]. Please see a copy of my visit note below.    ESTABLISHED PATIENT NEUROLOGY NOTE    DATE OF VISIT: 7/30/2025  MRN: [id number]  PATIENT NAME: Rose Ronquillo  YOB: 1990    Chief Complaint   Patient presents with     Follow Up     Patient was seen by ENT. Notes are in Epic.   Still doing infusions. Want to discuss infusion plans and options. Patient sent MyC message 7/28.     SUBJECTIVE:                                                      HISTORY OF PRESENT ILLNESS:  Rose is here for follow up regarding weakness and paresthesias of unclear etiology.  Symptoms came on acutely in early 2023.  Extensive workup including EMG did not provide an explanation for her symptoms but she has responded to pulse steroid therapy.  Currently we have been doing IV steroids every 2 weeks, slowly tapering the dose over time.  Current dose for each infusion is 400mg.    We have discussed other immunomodulatory therapies but given concerns about potential side effects we have decided to stick with the steroids since Rose is tolerating these well.  She has history of additional problems with chronic sinusitus.    Rose mentions that ENT said that there is some restriction in her larynx.   They wonder if this is related to the neuromuscular symptoms.   She is in vocal therapy. Swallowing is okay. Some discomfort in the throat.     No clear pattern of symptoms, not clearly affected by the steroids. Symptoms are worse with humidity. Same with her limb symptoms. No profound weakness, but fatigue with exercise. She is still very active:  She does Pilates twice a week and strength training multiple times per week as well.  Certain exercises can sometimes trigger what she describes as a lack of communication between her brain  and limbs, transiently.  Some paresthesias in the hands that come and go.  She still has some trouble with coordination in the left hand when typing, for instance.    She asks about moving her next infusion out. Insurance not likely to cover an earlier than scheduled infusion.     CURRENT MEDICATIONS:   Current Outpatient Medications   Medication Sig Dispense Refill     azelastine (ASTELIN) 0.1 % nasal spray Spray 1 spray in nostril.       Calcium Carbonate Antacid (TUMS PO)        Cholecalciferol (VITAMIN D PO)        doxycycline monohydrate (MONODOX) 100 MG capsule Take 100 mg by mouth 2 times daily. End date 9/12/24       fluticasone (FLONASE) 50 MCG/ACT nasal spray 1 spray.       gabapentin (NEURONTIN) 100 MG capsule Take 100 mg by mouth Takes 400mg in the morning       gabapentin (NEURONTIN) 300 MG capsule Take 300 mg by mouth       ibuprofen (ADVIL,MOTRIN) 200 MG tablet Take 200 mg by mouth every 4 hours as needed       Ipratropium-Albuterol (COMBIVENT RESPIMAT)  MCG/ACT inhaler Inhale 1 puff into the lungs 4 times daily Not to exceed 6 doses per day. 1 Inhaler 0     mupirocin (BACTROBAN) 2 % external ointment Apply a small amount to the inside of both nostrils twice daily for one week. Then after first week, apply a small amount to the inside of both nostrils 2-3 times per week. 15 g 2     sertraline (ZOLOFT) 25 MG tablet Take 1 tablet by mouth daily at 2 pm       UNABLE TO FIND 500 mg every 14 days MEDICATION NAME: Prednisone 250 mg infusion every other week.       VYVANSE 50 MG OR CAPS 1 CAPSULE DAILY       ZOLOFT 50 MG OR TABS I tablet daily 30 0     amLODIPine (NORVASC) 2.5 MG tablet TAKE 1 TABLET(2.5 MG) BY MOUTH DAILY (Patient not taking: Reported on 7/30/2025) 90 tablet 3     cefdinir (OMNICEF) 300 MG capsule  (Patient not taking: Reported on 7/30/2025)       fish oil-omega-3 fatty acids (OMEGA-3 FISH OIL) 1000 MG capsule  (Patient not taking: Reported on 7/30/2025)       gentamicin (GARAMYCIN)  40 MG/ML injection  (Patient not taking: Reported on 7/30/2025)       levofloxacin (LEVAQUIN) 500 MG tablet Take 500 mg by mouth. (Patient not taking: Reported on 7/30/2025)       modafinil (PROVIGIL) 100 MG tablet Take 1 tablet (100 mg) by mouth daily (Patient not taking: Reported on 7/30/2025) 30 tablet 5     Multiple Vitamin (MULTI-VITAMIN PO)  (Patient not taking: Reported on 7/30/2025)       nitroGLYcerin (NITRO-BID) 2 % OINT ointment Place 1 inch (15 mg) onto the skin every 24 hours Apply to affected area ( toes or fingers)  at night and avoid touching eyes or face . (Patient not taking: Reported on 7/30/2025) 30 g 5     No current facility-administered medications for this visit.       RECENT DIAGNOSTIC STUDIES:   Labs: No results found for any visits on 07/30/25.      REVIEW OF SYSTEMS:                                                      10-point review of systems is negative except as mentioned above in HPI.    EXAM:                                                      Physical Exam:   Vitals: /74   Pulse 75   Wt 56.6 kg (124 lb 12.8 oz)   BMI 18.98 kg/m    BMI= Body mass index is 18.98 kg/m .  GENERAL: NAD.  HEENT: NC/AT.  PULM: Non-labored breathing.   Focused Neurologic:  MENTAL STATUS: Alert, attentive. Speech is fluent, slight dysphonia intermittently. Normal comprehension. Normal concentration. Adequate fund of knowledge.   CRANIAL NERVES: Facial movement normal. EOM full. Hearing intact to conversation. Trapezius strength intact.   MOTOR: Strength is full in the deltoids, biceps, triceps and hands. Strength in the legs is again full on today's exam.  Tone and bulk normal.   DTRs: Intact and symmetric in biceps, BR, patellae.  2+/2 in the upper extremities, slightly brisk at the knees. No crossed adductor response.  (This continues to be better than earlier exams).   SENSATION: Normal light touch and pinprick in hands and ankles.  COORDINATION: Finger tapping is a little bit slow, more so on  the left.  STATION AND GAIT: Casual gait is normal.   Right hand-dominant.    ASSESSMENT and PLAN:                                                      Assessment:    ICD-10-CM    1. Autoimmune disease  M35.9       2. Hyperesthesia  R20.3       3. Proximal weakness of extremity  M62.89       4. Incoordination  R27.9       5. Encounter for long-term (current) use of medications  Z79.899           Ms. Ronquillo is a pleasant 35-year-old woman following up today for suspected autoimmune disorder involving acute onset paresthesias and weakness, improved with steroids.  We will plan to continue the steroid infusions every couple of weeks.  We will have to get Rose plugged in with one of my colleagues for 6-month follow-up since I am leaving the clinic.  She understands and agrees with the plan.    I suspect that her insurance will not let her do her upcoming infusion early so we discussed doing it later, after she returns from her trip.  She still does not have issues with profound weakness in the legs lately    Plan:  Continue the infusions every 2 weeks.  Let me know what the infusion center says about adjusting the timing of your upcoming infusion around your vacation.   Follow-up with neurology again in 6 months.  Please let us know if any concerns arise in the meantime.    Total Time: 30 minutes were spent with the patient and in chart review/documentation (as described above in Assessment and Plan)/coordinating the care on date of service.    Yolande Colvin MD  Neurology    Dragon software used in the dictation of this note.                    Again, thank you for allowing me to participate in the care of your patient.        Sincerely,        Yolande Colvin MD    Electronically signed

## 2025-07-30 NOTE — PATIENT INSTRUCTIONS
Plan:  Continue the infusions every 2 weeks.  Let me know what the infusion center says about adjusting the timing of your upcoming infusion around your vacation.   Follow-up with neurology again in 6 months.  Please let us know if any concerns arise in the meantime.

## 2025-07-30 NOTE — TELEPHONE ENCOUNTER
Okay to delay the steroid infusion.  Spoke with the patient about this at her visit today.    BENY

## 2025-07-30 NOTE — PROGRESS NOTES
ESTABLISHED PATIENT NEUROLOGY NOTE    DATE OF VISIT: 7/30/2025  MRN: [id number]  PATIENT NAME: Rose Ronquillo  YOB: 1990    Chief Complaint   Patient presents with    Follow Up     Patient was seen by ENT. Notes are in Epic.   Still doing infusions. Want to discuss infusion plans and options. Patient sent MyC message 7/28.     SUBJECTIVE:                                                      HISTORY OF PRESENT ILLNESS:  Rose is here for follow up regarding weakness and paresthesias of unclear etiology.  Symptoms came on acutely in early 2023.  Extensive workup including EMG did not provide an explanation for her symptoms but she has responded to pulse steroid therapy.  Currently we have been doing IV steroids every 2 weeks, slowly tapering the dose over time.  Current dose for each infusion is 400mg.    We have discussed other immunomodulatory therapies but given concerns about potential side effects we have decided to stick with the steroids since Rose is tolerating these well.  She has history of additional problems with chronic sinusitus.    Rose mentions that ENT said that there is some restriction in her larynx.   They wonder if this is related to the neuromuscular symptoms.   She is in vocal therapy. Swallowing is okay. Some discomfort in the throat.     No clear pattern of symptoms, not clearly affected by the steroids. Symptoms are worse with humidity. Same with her limb symptoms. No profound weakness, but fatigue with exercise. She is still very active:  She does Pilates twice a week and strength training multiple times per week as well.  Certain exercises can sometimes trigger what she describes as a lack of communication between her brain and limbs, transiently.  Some paresthesias in the hands that come and go.  She still has some trouble with coordination in the left hand when typing, for instance.    She asks about moving her next infusion out. Insurance not likely to cover an  earlier than scheduled infusion.     CURRENT MEDICATIONS:   Current Outpatient Medications   Medication Sig Dispense Refill    azelastine (ASTELIN) 0.1 % nasal spray Spray 1 spray in nostril.      Calcium Carbonate Antacid (TUMS PO)       Cholecalciferol (VITAMIN D PO)       doxycycline monohydrate (MONODOX) 100 MG capsule Take 100 mg by mouth 2 times daily. End date 9/12/24      fluticasone (FLONASE) 50 MCG/ACT nasal spray 1 spray.      gabapentin (NEURONTIN) 100 MG capsule Take 100 mg by mouth Takes 400mg in the morning      gabapentin (NEURONTIN) 300 MG capsule Take 300 mg by mouth      ibuprofen (ADVIL,MOTRIN) 200 MG tablet Take 200 mg by mouth every 4 hours as needed      Ipratropium-Albuterol (COMBIVENT RESPIMAT)  MCG/ACT inhaler Inhale 1 puff into the lungs 4 times daily Not to exceed 6 doses per day. 1 Inhaler 0    mupirocin (BACTROBAN) 2 % external ointment Apply a small amount to the inside of both nostrils twice daily for one week. Then after first week, apply a small amount to the inside of both nostrils 2-3 times per week. 15 g 2    sertraline (ZOLOFT) 25 MG tablet Take 1 tablet by mouth daily at 2 pm      UNABLE TO FIND 500 mg every 14 days MEDICATION NAME: Prednisone 250 mg infusion every other week.      VYVANSE 50 MG OR CAPS 1 CAPSULE DAILY      ZOLOFT 50 MG OR TABS I tablet daily 30 0    amLODIPine (NORVASC) 2.5 MG tablet TAKE 1 TABLET(2.5 MG) BY MOUTH DAILY (Patient not taking: Reported on 7/30/2025) 90 tablet 3    cefdinir (OMNICEF) 300 MG capsule  (Patient not taking: Reported on 7/30/2025)      fish oil-omega-3 fatty acids (OMEGA-3 FISH OIL) 1000 MG capsule  (Patient not taking: Reported on 7/30/2025)      gentamicin (GARAMYCIN) 40 MG/ML injection  (Patient not taking: Reported on 7/30/2025)      levofloxacin (LEVAQUIN) 500 MG tablet Take 500 mg by mouth. (Patient not taking: Reported on 7/30/2025)      modafinil (PROVIGIL) 100 MG tablet Take 1 tablet (100 mg) by mouth daily (Patient not  taking: Reported on 7/30/2025) 30 tablet 5    Multiple Vitamin (MULTI-VITAMIN PO)  (Patient not taking: Reported on 7/30/2025)      nitroGLYcerin (NITRO-BID) 2 % OINT ointment Place 1 inch (15 mg) onto the skin every 24 hours Apply to affected area ( toes or fingers)  at night and avoid touching eyes or face . (Patient not taking: Reported on 7/30/2025) 30 g 5     No current facility-administered medications for this visit.       RECENT DIAGNOSTIC STUDIES:   Labs: No results found for any visits on 07/30/25.      REVIEW OF SYSTEMS:                                                      10-point review of systems is negative except as mentioned above in HPI.    EXAM:                                                      Physical Exam:   Vitals: /74   Pulse 75   Wt 56.6 kg (124 lb 12.8 oz)   BMI 18.98 kg/m    BMI= Body mass index is 18.98 kg/m .  GENERAL: NAD.  HEENT: NC/AT.  PULM: Non-labored breathing.   Focused Neurologic:  MENTAL STATUS: Alert, attentive. Speech is fluent, slight dysphonia intermittently. Normal comprehension. Normal concentration. Adequate fund of knowledge.   CRANIAL NERVES: Facial movement normal. EOM full. Hearing intact to conversation. Trapezius strength intact.   MOTOR: Strength is full in the deltoids, biceps, triceps and hands. Strength in the legs is again full on today's exam.  Tone and bulk normal.   DTRs: Intact and symmetric in biceps, BR, patellae.  2+/2 in the upper extremities, slightly brisk at the knees. No crossed adductor response.  (This continues to be better than earlier exams).   SENSATION: Normal light touch and pinprick in hands and ankles.  COORDINATION: Finger tapping is a little bit slow, more so on the left.  STATION AND GAIT: Casual gait is normal.   Right hand-dominant.    ASSESSMENT and PLAN:                                                      Assessment:    ICD-10-CM    1. Autoimmune disease  M35.9       2. Hyperesthesia  R20.3       3. Proximal weakness  of extremity  M62.89       4. Incoordination  R27.9       5. Encounter for long-term (current) use of medications  Z79.899           Ms. Ronquillo is a pleasant 35-year-old woman following up today for suspected autoimmune disorder involving acute onset paresthesias and weakness, improved with steroids.  We will plan to continue the steroid infusions every couple of weeks.  We will have to get Rose plugged in with one of my colleagues for 6-month follow-up since I am leaving the clinic.  She understands and agrees with the plan.    I suspect that her insurance will not let her do her upcoming infusion early so we discussed doing it later, after she returns from her trip.  She still does not have issues with profound weakness in the legs lately    Plan:  Continue the infusions every 2 weeks.  Let me know what the infusion center says about adjusting the timing of your upcoming infusion around your vacation.   Follow-up with neurology again in 6 months.  Please let us know if any concerns arise in the meantime.    Total Time: 30 minutes were spent with the patient and in chart review/documentation (as described above in Assessment and Plan)/coordinating the care on date of service.    Yolande Colvin MD  Neurology    Dragon software used in the dictation of this note.

## 2025-08-21 ENCOUNTER — PRE VISIT (OUTPATIENT)
Dept: OTOLARYNGOLOGY | Facility: CLINIC | Age: 35
End: 2025-08-21

## 2025-08-25 ENCOUNTER — VIRTUAL VISIT (OUTPATIENT)
Dept: OTOLARYNGOLOGY | Facility: CLINIC | Age: 35
End: 2025-08-25
Payer: COMMERCIAL

## 2025-08-25 ENCOUNTER — INFUSION THERAPY VISIT (OUTPATIENT)
Dept: INFUSION THERAPY | Facility: HOSPITAL | Age: 35
End: 2025-08-25
Attending: PSYCHIATRY & NEUROLOGY
Payer: COMMERCIAL

## 2025-08-25 VITALS
OXYGEN SATURATION: 96 % | DIASTOLIC BLOOD PRESSURE: 59 MMHG | SYSTOLIC BLOOD PRESSURE: 95 MMHG | HEART RATE: 83 BPM | TEMPERATURE: 97.6 F | RESPIRATION RATE: 16 BRPM

## 2025-08-25 DIAGNOSIS — R20.3 HYPERESTHESIA: Primary | ICD-10-CM

## 2025-08-25 DIAGNOSIS — J38.7 LARYNGEAL HYPERFUNCTION: ICD-10-CM

## 2025-08-25 DIAGNOSIS — J38.7 IRRITABLE LARYNX SYNDROME: ICD-10-CM

## 2025-08-25 DIAGNOSIS — R49.0 DYSPHONIA: Primary | ICD-10-CM

## 2025-08-25 PROCEDURE — 96361 HYDRATE IV INFUSION ADD-ON: CPT

## 2025-08-25 PROCEDURE — 96365 THER/PROPH/DIAG IV INF INIT: CPT

## 2025-08-25 PROCEDURE — 250N000011 HC RX IP 250 OP 636: Performed by: PSYCHIATRY & NEUROLOGY

## 2025-08-25 PROCEDURE — 258N000003 HC RX IP 258 OP 636: Performed by: PSYCHIATRY & NEUROLOGY

## 2025-08-25 RX ADMIN — SODIUM CHLORIDE 400 MG: 9 INJECTION, SOLUTION INTRAVENOUS at 11:52

## 2025-08-25 RX ADMIN — SODIUM CHLORIDE 250 ML: 0.9 INJECTION, SOLUTION INTRAVENOUS at 11:15
